# Patient Record
Sex: FEMALE
[De-identification: names, ages, dates, MRNs, and addresses within clinical notes are randomized per-mention and may not be internally consistent; named-entity substitution may affect disease eponyms.]

---

## 2017-03-21 ENCOUNTER — APPOINTMENT (OUTPATIENT)
Dept: SURGERY | Facility: CLINIC | Age: 52
End: 2017-03-21

## 2017-03-21 VITALS
WEIGHT: 135 LBS | BODY MASS INDEX: 21.69 KG/M2 | HEART RATE: 69 BPM | DIASTOLIC BLOOD PRESSURE: 91 MMHG | SYSTOLIC BLOOD PRESSURE: 142 MMHG | HEIGHT: 66 IN

## 2017-03-21 DIAGNOSIS — M32.9 SYSTEMIC LUPUS ERYTHEMATOSUS, UNSPECIFIED: ICD-10-CM

## 2017-03-21 DIAGNOSIS — D69.3 IMMUNE THROMBOCYTOPENIC PURPURA: ICD-10-CM

## 2017-03-21 DIAGNOSIS — Z80.9 FAMILY HISTORY OF MALIGNANT NEOPLASM, UNSPECIFIED: ICD-10-CM

## 2017-03-21 DIAGNOSIS — R22.0 LOCALIZED SWELLING, MASS AND LUMP, HEAD: ICD-10-CM

## 2017-03-22 PROBLEM — Z80.9 FAMILY HISTORY OF CANCER: Status: ACTIVE | Noted: 2017-03-22

## 2017-03-22 PROBLEM — D69.3 CHRONIC ITP (IDIOPATHIC THROMBOCYTOPENIC PURPURA): Status: ACTIVE | Noted: 2017-03-22

## 2017-03-22 PROBLEM — M32.9 LUPUS (SYSTEMIC LUPUS ERYTHEMATOSUS): Status: ACTIVE | Noted: 2017-03-22

## 2017-03-22 RX ORDER — ALPRAZOLAM 2 MG/1
TABLET ORAL
Refills: 0 | Status: ACTIVE | COMMUNITY

## 2017-03-22 RX ORDER — HYDROXYCHLOROQUINE SULFATE 200 MG/1
TABLET ORAL
Refills: 0 | Status: ACTIVE | COMMUNITY

## 2017-06-12 ENCOUNTER — INPATIENT (INPATIENT)
Facility: HOSPITAL | Age: 52
LOS: 6 days | Discharge: HOME | DRG: 392 | End: 2017-06-19
Attending: INTERNAL MEDICINE | Admitting: INTERNAL MEDICINE
Payer: COMMERCIAL

## 2017-06-12 VITALS
TEMPERATURE: 98 F | DIASTOLIC BLOOD PRESSURE: 75 MMHG | RESPIRATION RATE: 18 BRPM | SYSTOLIC BLOOD PRESSURE: 105 MMHG | HEART RATE: 97 BPM | OXYGEN SATURATION: 98 %

## 2017-06-12 DIAGNOSIS — Z90.710 ACQUIRED ABSENCE OF BOTH CERVIX AND UTERUS: Chronic | ICD-10-CM

## 2017-06-12 DIAGNOSIS — Z90.81 ACQUIRED ABSENCE OF SPLEEN: Chronic | ICD-10-CM

## 2017-06-12 DIAGNOSIS — R10.9 UNSPECIFIED ABDOMINAL PAIN: ICD-10-CM

## 2017-06-12 LAB
ALBUMIN SERPL ELPH-MCNC: 4.6 G/DL — SIGNIFICANT CHANGE UP (ref 3.3–5)
ALP SERPL-CCNC: 63 U/L — SIGNIFICANT CHANGE UP (ref 40–120)
ALT FLD-CCNC: 17 U/L RC — SIGNIFICANT CHANGE UP (ref 10–45)
ANION GAP SERPL CALC-SCNC: 14 MMOL/L — SIGNIFICANT CHANGE UP (ref 5–17)
APPEARANCE UR: ABNORMAL
AST SERPL-CCNC: 18 U/L — SIGNIFICANT CHANGE UP (ref 10–40)
BACTERIA # UR AUTO: ABNORMAL /HPF
BASOPHILS # BLD AUTO: 0.1 K/UL — SIGNIFICANT CHANGE UP (ref 0–0.2)
BASOPHILS NFR BLD AUTO: 1.2 % — SIGNIFICANT CHANGE UP (ref 0–2)
BILIRUB SERPL-MCNC: 0.4 MG/DL — SIGNIFICANT CHANGE UP (ref 0.2–1.2)
BILIRUB UR-MCNC: NEGATIVE — SIGNIFICANT CHANGE UP
BUN SERPL-MCNC: 14 MG/DL — SIGNIFICANT CHANGE UP (ref 7–23)
CALCIUM SERPL-MCNC: 9.7 MG/DL — SIGNIFICANT CHANGE UP (ref 8.4–10.5)
CHLORIDE SERPL-SCNC: 107 MMOL/L — SIGNIFICANT CHANGE UP (ref 96–108)
CO2 SERPL-SCNC: 25 MMOL/L — SIGNIFICANT CHANGE UP (ref 22–31)
COD CRY URNS QL: ABNORMAL
COLOR SPEC: YELLOW — SIGNIFICANT CHANGE UP
COMMENT - URINE: SIGNIFICANT CHANGE UP
CREAT SERPL-MCNC: 0.76 MG/DL — SIGNIFICANT CHANGE UP (ref 0.5–1.3)
DIFF PNL FLD: NEGATIVE — SIGNIFICANT CHANGE UP
EOSINOPHIL # BLD AUTO: 0.2 K/UL — SIGNIFICANT CHANGE UP (ref 0–0.5)
EOSINOPHIL NFR BLD AUTO: 1.8 % — SIGNIFICANT CHANGE UP (ref 0–6)
EPI CELLS # UR: SIGNIFICANT CHANGE UP /HPF
GAS PNL BLDV: SIGNIFICANT CHANGE UP
GLUCOSE SERPL-MCNC: 97 MG/DL — SIGNIFICANT CHANGE UP (ref 70–99)
GLUCOSE UR QL: NEGATIVE — SIGNIFICANT CHANGE UP
HCT VFR BLD CALC: 43.9 % — SIGNIFICANT CHANGE UP (ref 34.5–45)
HGB BLD-MCNC: 14.7 G/DL — SIGNIFICANT CHANGE UP (ref 11.5–15.5)
KETONES UR-MCNC: NEGATIVE — SIGNIFICANT CHANGE UP
LEUKOCYTE ESTERASE UR-ACNC: NEGATIVE — SIGNIFICANT CHANGE UP
LIDOCAIN IGE QN: 52 U/L — SIGNIFICANT CHANGE UP (ref 7–60)
LYMPHOCYTES # BLD AUTO: 41.6 % — SIGNIFICANT CHANGE UP (ref 13–44)
LYMPHOCYTES # BLD AUTO: 5 K/UL — HIGH (ref 1–3.3)
MCHC RBC-ENTMCNC: 33.5 GM/DL — SIGNIFICANT CHANGE UP (ref 32–36)
MCHC RBC-ENTMCNC: 33.7 PG — SIGNIFICANT CHANGE UP (ref 27–34)
MCV RBC AUTO: 101 FL — HIGH (ref 80–100)
MONOCYTES # BLD AUTO: 1 K/UL — HIGH (ref 0–0.9)
MONOCYTES NFR BLD AUTO: 8.2 % — SIGNIFICANT CHANGE UP (ref 2–14)
NEUTROPHILS # BLD AUTO: 5.7 K/UL — SIGNIFICANT CHANGE UP (ref 1.8–7.4)
NEUTROPHILS NFR BLD AUTO: 47.3 % — SIGNIFICANT CHANGE UP (ref 43–77)
NITRITE UR-MCNC: NEGATIVE — SIGNIFICANT CHANGE UP
PH UR: 5.5 — SIGNIFICANT CHANGE UP (ref 5–8)
PLATELET # BLD AUTO: 336 K/UL — SIGNIFICANT CHANGE UP (ref 150–400)
POTASSIUM SERPL-MCNC: 4.6 MMOL/L — SIGNIFICANT CHANGE UP (ref 3.5–5.3)
POTASSIUM SERPL-SCNC: 4.6 MMOL/L — SIGNIFICANT CHANGE UP (ref 3.5–5.3)
PROT SERPL-MCNC: 7.4 G/DL — SIGNIFICANT CHANGE UP (ref 6–8.3)
PROT UR-MCNC: SIGNIFICANT CHANGE UP
RBC # BLD: 4.36 M/UL — SIGNIFICANT CHANGE UP (ref 3.8–5.2)
RBC # FLD: 12.2 % — SIGNIFICANT CHANGE UP (ref 10.3–14.5)
RBC CASTS # UR COMP ASSIST: SIGNIFICANT CHANGE UP /HPF (ref 0–2)
SODIUM SERPL-SCNC: 146 MMOL/L — HIGH (ref 135–145)
SP GR SPEC: 1.03 — HIGH (ref 1.01–1.02)
UROBILINOGEN FLD QL: 1
WBC # BLD: 12 K/UL — HIGH (ref 3.8–10.5)
WBC # FLD AUTO: 12 K/UL — HIGH (ref 3.8–10.5)
WBC UR QL: SIGNIFICANT CHANGE UP /HPF (ref 0–5)

## 2017-06-12 PROCEDURE — 99285 EMERGENCY DEPT VISIT HI MDM: CPT

## 2017-06-12 PROCEDURE — 74177 CT ABD & PELVIS W/CONTRAST: CPT | Mod: 26

## 2017-06-12 RX ORDER — ONDANSETRON 8 MG/1
4 TABLET, FILM COATED ORAL ONCE
Qty: 0 | Refills: 0 | Status: COMPLETED | OUTPATIENT
Start: 2017-06-12 | End: 2017-06-12

## 2017-06-12 RX ORDER — MORPHINE SULFATE 50 MG/1
4 CAPSULE, EXTENDED RELEASE ORAL ONCE
Qty: 0 | Refills: 0 | Status: DISCONTINUED | OUTPATIENT
Start: 2017-06-12 | End: 2017-06-12

## 2017-06-12 RX ORDER — HYDROMORPHONE HYDROCHLORIDE 2 MG/ML
1 INJECTION INTRAMUSCULAR; INTRAVENOUS; SUBCUTANEOUS ONCE
Qty: 0 | Refills: 0 | Status: DISCONTINUED | OUTPATIENT
Start: 2017-06-12 | End: 2017-06-12

## 2017-06-12 RX ORDER — SODIUM CHLORIDE 9 MG/ML
2000 INJECTION, SOLUTION INTRAVENOUS ONCE
Qty: 0 | Refills: 0 | Status: COMPLETED | OUTPATIENT
Start: 2017-06-12 | End: 2017-06-12

## 2017-06-12 RX ADMIN — MORPHINE SULFATE 4 MILLIGRAM(S): 50 CAPSULE, EXTENDED RELEASE ORAL at 19:27

## 2017-06-12 RX ADMIN — HYDROMORPHONE HYDROCHLORIDE 1 MILLIGRAM(S): 2 INJECTION INTRAMUSCULAR; INTRAVENOUS; SUBCUTANEOUS at 19:50

## 2017-06-12 RX ADMIN — SODIUM CHLORIDE 2000 MILLILITER(S): 9 INJECTION, SOLUTION INTRAVENOUS at 18:46

## 2017-06-12 RX ADMIN — ONDANSETRON 4 MILLIGRAM(S): 8 TABLET, FILM COATED ORAL at 22:09

## 2017-06-12 RX ADMIN — HYDROMORPHONE HYDROCHLORIDE 1 MILLIGRAM(S): 2 INJECTION INTRAMUSCULAR; INTRAVENOUS; SUBCUTANEOUS at 19:31

## 2017-06-12 RX ADMIN — ONDANSETRON 4 MILLIGRAM(S): 8 TABLET, FILM COATED ORAL at 18:46

## 2017-06-12 RX ADMIN — MORPHINE SULFATE 4 MILLIGRAM(S): 50 CAPSULE, EXTENDED RELEASE ORAL at 22:30

## 2017-06-12 RX ADMIN — MORPHINE SULFATE 4 MILLIGRAM(S): 50 CAPSULE, EXTENDED RELEASE ORAL at 18:46

## 2017-06-12 RX ADMIN — MORPHINE SULFATE 4 MILLIGRAM(S): 50 CAPSULE, EXTENDED RELEASE ORAL at 22:09

## 2017-06-12 NOTE — ED PROVIDER NOTE - NS ED ROS FT
No fever/chills, no change in vision, no throat pain, no chest pain, +abdominal pain, +nausea, no vomiting,  no dysuria, no joint pain, no rashes, no focal numbness or weakness, no known mental health issues

## 2017-06-12 NOTE — ED ADULT NURSE REASSESSMENT NOTE - NS ED NURSE REASSESS COMMENT FT1
patient is resting in the room. c/o abdominal pain and distention. abdomen is slightly distended but soft. patient was medicated for pain as per md orders. denies any fevers, chills, chest pain, sob. vss/nad. patient is waiting for ct scan and dispo. will continue to monitor.

## 2017-06-12 NOTE — ED ADULT NURSE NOTE - OBJECTIVE STATEMENT
pt presents with abd distention, abd pain and vomiting x 1 day, hyperemesis began today, last BM this am, no change in stool, no UTI symptoms, H/O Pancreatitis, denies fever, chills, or chest pain, pt A;Ox3, no acute resp distress noted, v/s stable, abd soft, tender throughout abd., voids freely, good strength to all extrem, MD schumacher, labs sent, will continue to monitor

## 2017-06-12 NOTE — ED PROVIDER NOTE - PHYSICAL EXAMINATION
AAOX3, NAD, NCAT, EOMI, PERRL, MMM, Neck Supple, RRR, CTABL, ABD firm, distended, epigastric abd pain without rebound or guarding,, +BS, No CVAT, EXT warm, well perfused, No Edema, Distal Pulses Intact, No Rash,  MAEx4, Sensation to soft touch grossly intact, normal strength/tone.

## 2017-06-12 NOTE — ED PROVIDER NOTE - MEDICAL DECISION MAKING DETAILS
.pancreatitis - labs, IVF, analgesia, antiemetic as needed, reassess. .51F with hx of lupus ddx four years ago not on any controller meds, recurring pancreatitis, admitted last year to [facility], pw increaseed abd pain typical of her pancreatits, abd slightly distended with mild epigastric tenderness, will obtain labs, IVF, analgesia, antiemetic as needed, and possible CTAP, reassess. Will call Dr. Chávez [phone]. - ZR

## 2017-06-12 NOTE — ED PROVIDER NOTE - PROGRESS NOTE DETAILS
Discussed with Dr. Chávez - admit to [facility] if pt requires admission for pain control. Discussed with Radiology resident. Preliminary read is unremarkable CTAP

## 2017-06-12 NOTE — ED PROVIDER NOTE - OBJECTIVE STATEMENT
51F pmhx of SLE (diagnosed 12 years ago), pancreatitis, ITP (s/p splenectomy) pw epigastric abd pain since this afternoon typical aw bloating and abd distension. Typical of her pancreatitis pain. 51F pmhx of SLE (diagnosed 12 years ago), pancreatitis, ITP (s/p splenectomy) pw epigastric abd pain since this afternoon typical aw bloating and abd distension. Typical of her pancreatitis pain. Pain is 10/10 non radiating. Did not take meds for pain. Pain began after eating cheeseburger.   Denies fever, emesis, cough, diarrhea, hematochezia, melena, dysuria, rhinnorhea, rash,

## 2017-06-12 NOTE — ED ADULT NURSE REASSESSMENT NOTE - NS ED NURSE REASSESS COMMENT FT1
patient is resting in the [facility] waiting for ct results and dispo. c/o abdominal pain and was medicated as per md orders. vss/nad will continue to monitor.

## 2017-06-13 DIAGNOSIS — Z29.9 ENCOUNTER FOR PROPHYLACTIC MEASURES, UNSPECIFIED: ICD-10-CM

## 2017-06-13 DIAGNOSIS — R10.9 UNSPECIFIED ABDOMINAL PAIN: ICD-10-CM

## 2017-06-13 DIAGNOSIS — K86.1 OTHER CHRONIC PANCREATITIS: ICD-10-CM

## 2017-06-13 LAB
ALBUMIN SERPL ELPH-MCNC: 3.4 G/DL — SIGNIFICANT CHANGE UP (ref 3.3–5)
ALP SERPL-CCNC: 48 U/L — SIGNIFICANT CHANGE UP (ref 40–120)
ALT FLD-CCNC: 12 U/L — SIGNIFICANT CHANGE UP (ref 10–45)
ANION GAP SERPL CALC-SCNC: 11 MMOL/L — SIGNIFICANT CHANGE UP (ref 5–17)
AST SERPL-CCNC: 21 U/L — SIGNIFICANT CHANGE UP (ref 10–40)
BASOPHILS # BLD AUTO: 0.05 K/UL — SIGNIFICANT CHANGE UP (ref 0–0.2)
BASOPHILS NFR BLD AUTO: 0.5 % — SIGNIFICANT CHANGE UP (ref 0–2)
BILIRUB SERPL-MCNC: 0.6 MG/DL — SIGNIFICANT CHANGE UP (ref 0.2–1.2)
BUN SERPL-MCNC: 9 MG/DL — SIGNIFICANT CHANGE UP (ref 7–23)
CALCIUM SERPL-MCNC: 8.2 MG/DL — LOW (ref 8.4–10.5)
CHLORIDE SERPL-SCNC: 107 MMOL/L — SIGNIFICANT CHANGE UP (ref 96–108)
CO2 SERPL-SCNC: 23 MMOL/L — SIGNIFICANT CHANGE UP (ref 22–31)
CREAT SERPL-MCNC: 0.73 MG/DL — SIGNIFICANT CHANGE UP (ref 0.5–1.3)
CULTURE RESULTS: NO GROWTH — SIGNIFICANT CHANGE UP
EOSINOPHIL # BLD AUTO: 0.15 K/UL — SIGNIFICANT CHANGE UP (ref 0–0.5)
EOSINOPHIL NFR BLD AUTO: 1.6 % — SIGNIFICANT CHANGE UP (ref 0–6)
ERYTHROCYTE [SEDIMENTATION RATE] IN BLOOD: 4 MM/HR — SIGNIFICANT CHANGE UP (ref 0–20)
GLUCOSE SERPL-MCNC: 92 MG/DL — SIGNIFICANT CHANGE UP (ref 70–99)
HCT VFR BLD CALC: 36.6 % — SIGNIFICANT CHANGE UP (ref 34.5–45)
HGB BLD-MCNC: 12.1 G/DL — SIGNIFICANT CHANGE UP (ref 11.5–15.5)
IMM GRANULOCYTES NFR BLD AUTO: 0.1 % — SIGNIFICANT CHANGE UP (ref 0–1.5)
LYMPHOCYTES # BLD AUTO: 4.05 K/UL — HIGH (ref 1–3.3)
LYMPHOCYTES # BLD AUTO: 42.9 % — SIGNIFICANT CHANGE UP (ref 13–44)
MAGNESIUM SERPL-MCNC: 1.9 MG/DL — SIGNIFICANT CHANGE UP (ref 1.6–2.6)
MCHC RBC-ENTMCNC: 32.3 PG — SIGNIFICANT CHANGE UP (ref 27–34)
MCHC RBC-ENTMCNC: 33.1 GM/DL — SIGNIFICANT CHANGE UP (ref 32–36)
MCV RBC AUTO: 97.6 FL — SIGNIFICANT CHANGE UP (ref 80–100)
MONOCYTES # BLD AUTO: 0.82 K/UL — SIGNIFICANT CHANGE UP (ref 0–0.9)
MONOCYTES NFR BLD AUTO: 8.7 % — SIGNIFICANT CHANGE UP (ref 2–14)
NEUTROPHILS # BLD AUTO: 4.36 K/UL — SIGNIFICANT CHANGE UP (ref 1.8–7.4)
NEUTROPHILS NFR BLD AUTO: 46.2 % — SIGNIFICANT CHANGE UP (ref 43–77)
PHOSPHATE SERPL-MCNC: 3.7 MG/DL — SIGNIFICANT CHANGE UP (ref 2.5–4.5)
PLATELET # BLD AUTO: 317 K/UL — SIGNIFICANT CHANGE UP (ref 150–400)
POTASSIUM SERPL-MCNC: 4.3 MMOL/L — SIGNIFICANT CHANGE UP (ref 3.5–5.3)
POTASSIUM SERPL-SCNC: 4.3 MMOL/L — SIGNIFICANT CHANGE UP (ref 3.5–5.3)
PROT SERPL-MCNC: 6.1 G/DL — SIGNIFICANT CHANGE UP (ref 6–8.3)
RBC # BLD: 3.75 M/UL — LOW (ref 3.8–5.2)
RBC # FLD: 15.1 % — HIGH (ref 10.3–14.5)
SODIUM SERPL-SCNC: 141 MMOL/L — SIGNIFICANT CHANGE UP (ref 135–145)
SPECIMEN SOURCE: SIGNIFICANT CHANGE UP
WBC # BLD: 9.44 K/UL — SIGNIFICANT CHANGE UP (ref 3.8–10.5)
WBC # FLD AUTO: 9.44 K/UL — SIGNIFICANT CHANGE UP (ref 3.8–10.5)

## 2017-06-13 PROCEDURE — 99223 1ST HOSP IP/OBS HIGH 75: CPT

## 2017-06-13 PROCEDURE — 93010 ELECTROCARDIOGRAM REPORT: CPT

## 2017-06-13 RX ORDER — FAMOTIDINE 10 MG/ML
20 INJECTION INTRAVENOUS
Qty: 0 | Refills: 0 | Status: DISCONTINUED | OUTPATIENT
Start: 2017-06-13 | End: 2017-06-13

## 2017-06-13 RX ORDER — HYDROMORPHONE HYDROCHLORIDE 2 MG/ML
0.5 INJECTION INTRAMUSCULAR; INTRAVENOUS; SUBCUTANEOUS ONCE
Qty: 0 | Refills: 0 | Status: DISCONTINUED | OUTPATIENT
Start: 2017-06-13 | End: 2017-06-13

## 2017-06-13 RX ORDER — SODIUM CHLORIDE 9 MG/ML
1000 INJECTION INTRAMUSCULAR; INTRAVENOUS; SUBCUTANEOUS
Qty: 0 | Refills: 0 | Status: DISCONTINUED | OUTPATIENT
Start: 2017-06-13 | End: 2017-06-14

## 2017-06-13 RX ORDER — ACETAMINOPHEN 500 MG
500 TABLET ORAL EVERY 6 HOURS
Qty: 0 | Refills: 0 | Status: DISCONTINUED | OUTPATIENT
Start: 2017-06-13 | End: 2017-06-19

## 2017-06-13 RX ORDER — LIPASE/PROTEASE/AMYLASE 16-48-48K
2 CAPSULE,DELAYED RELEASE (ENTERIC COATED) ORAL
Qty: 0 | Refills: 0 | Status: DISCONTINUED | OUTPATIENT
Start: 2017-06-13 | End: 2017-06-19

## 2017-06-13 RX ORDER — ONDANSETRON 8 MG/1
4 TABLET, FILM COATED ORAL EVERY 6 HOURS
Qty: 0 | Refills: 0 | Status: DISCONTINUED | OUTPATIENT
Start: 2017-06-13 | End: 2017-06-19

## 2017-06-13 RX ORDER — ENOXAPARIN SODIUM 100 MG/ML
40 INJECTION SUBCUTANEOUS EVERY 24 HOURS
Qty: 0 | Refills: 0 | Status: DISCONTINUED | OUTPATIENT
Start: 2017-06-13 | End: 2017-06-13

## 2017-06-13 RX ORDER — FAMOTIDINE 10 MG/ML
20 INJECTION INTRAVENOUS
Qty: 0 | Refills: 0 | Status: DISCONTINUED | OUTPATIENT
Start: 2017-06-13 | End: 2017-06-14

## 2017-06-13 RX ADMIN — Medication 500 MILLIGRAM(S): at 08:44

## 2017-06-13 RX ADMIN — FAMOTIDINE 20 MILLIGRAM(S): 10 INJECTION INTRAVENOUS at 17:42

## 2017-06-13 RX ADMIN — HYDROMORPHONE HYDROCHLORIDE 0.5 MILLIGRAM(S): 2 INJECTION INTRAMUSCULAR; INTRAVENOUS; SUBCUTANEOUS at 13:07

## 2017-06-13 RX ADMIN — FAMOTIDINE 20 MILLIGRAM(S): 10 INJECTION INTRAVENOUS at 06:09

## 2017-06-13 RX ADMIN — HYDROMORPHONE HYDROCHLORIDE 0.5 MILLIGRAM(S): 2 INJECTION INTRAMUSCULAR; INTRAVENOUS; SUBCUTANEOUS at 13:03

## 2017-06-13 RX ADMIN — FAMOTIDINE 20 MILLIGRAM(S): 10 INJECTION INTRAVENOUS at 13:03

## 2017-06-13 RX ADMIN — HYDROMORPHONE HYDROCHLORIDE 0.5 MILLIGRAM(S): 2 INJECTION INTRAMUSCULAR; INTRAVENOUS; SUBCUTANEOUS at 04:41

## 2017-06-13 RX ADMIN — Medication 30 MILLILITER(S): at 03:22

## 2017-06-13 RX ADMIN — HYDROMORPHONE HYDROCHLORIDE 0.5 MILLIGRAM(S): 2 INJECTION INTRAMUSCULAR; INTRAVENOUS; SUBCUTANEOUS at 04:26

## 2017-06-13 RX ADMIN — Medication 500 MILLIGRAM(S): at 08:45

## 2017-06-13 NOTE — H&P ADULT - ASSESSMENT
50 yo female with PMH of SLE, chronic pancreatitis, ITP s/p splenectomy, presents here with abdominal pain and nausea.

## 2017-06-13 NOTE — H&P ADULT - PROBLEM SELECTOR PLAN 1
Possibly gastritis  CT abd/pelv shows normal pancreas, normal liver, no bowel obstruction  will c/w zofran 4mg IV q6h  c/w IVF  give maalox and pepcid  pain medication with tylenol, will try to avoid narcotics at this time  will keep NPO  ? need EGD ; consider GI consult Possibly gastritis vs. gastric ulcer  CT abd/pelv shows normal pancreas, normal liver, no bowel obstruction  will c/w zofran 4mg IV q6h  c/w IVF  give maalox and pepcid  pain medication with tylenol, will try to avoid narcotics at this time  will keep NPO  ? need EGD ; consider GI consult

## 2017-06-13 NOTE — CONSULT NOTE ADULT - SUBJECTIVE AND OBJECTIVE BOX
Patient is a 51y old  Female who presents with a chief complaint of Abdominal pain and nausea (2017 02:43)      HPI:  50 yo female with PMH of SLE, "chronic pancreatitis", ITP s/p splenectomy with presents with acute on chronic abdominal pain and nausea. Patient states that she intermittent has about pain, approximately once every 10 days. The vast majority of the time, the patient is moderate and she is able to tolerate it. Only a few times a year does the pain get severe enough that she needs to come to ED.   Approximately 3 days ago, she began to experience recurrence of her abdominal pain. Pain was 4/10 intensity, localized to the epigastrum, non-radiating and occurred after eating dinner. The pain was slightly better the next morning. However, after eating a bagel for lunch, she has severe epigastric pain, which was sharp, constant, 8/10 intensity and was associated with nausea and dry heaving. The patient was unable to eat or drink anymore so she came to ED to be evaluated. Pt states that her pain was similar to when she had acute pancreatitis.   The patient otherwise denies melena, blood in stool, diarrhea or constipation. In ED, the patient was hemodynamically stable and afebrile. Labs were unremarkable. lipase was normal. CT scan of abdomen and pelvis was unremarkable.   Pt denies taking medications. No NSAIDS. No ETOH. Pt had colonoscopy 3 years ago. Never had EGD.         PAST MEDICAL & SURGICAL HISTORY:  Lupus  Anxiety  ITP (idiopathic thrombocytopenic purpura)  Lupus (systemic lupus erythematosus)  Pancreas divisum  Pancreatitis  Breast CA  History of hysterectomy: secondary to endometriosis  History of splenectomy      Allergies    No Known Allergies      MEDICATIONS  (STANDING):  amylase/lipase/protease  (CREON 12,000 Units) 2Capsule(s) Oral three times a day with meals  sodium chloride 0.9%. 1000milliLiter(s) IV Continuous <Continuous>  famotidine Injectable 20milliGRAM(s) IV Push two times a day    MEDICATIONS  (PRN):  aluminum hydroxide/magnesium hydroxide/simethicone Suspension 30milliLiter(s) Oral every 4 hours PRN Dyspepsia  ondansetron Injectable 4milliGRAM(s) IV Push every 6 hours PRN Nausea and/or Vomiting  acetaminophen   Tablet. 500milliGRAM(s) Oral every 6 hours PRN Moderate Pain (4 - 6)      Social History:  No smoking, ETOH.     Review of Systems:    General:  No wt loss, fevers, chills, night sweats,fatigue,   CV:  No pain, palpitatioins, hypo/hypertension  Resp:  No dyspnea, cough, tachypnea, wheezing  GI:  ABdominal pain, nausea and retching. No diarrhea, No constipatiion, No weight loss, No fever, No pruritis, No rectal bleeding, No tarry stools, No dysphagia,  :  No pain, bleeding, incontinence, nocturia  Muscle:  No pain, weakness  Neuro:  No weakness, tingling, memory problems  Psych:  No fatigue, insomnia, mood problems, depression  Endocrine:  No polyuria, polydypsia, cold/heat intolerance  Heme:  No petechiae, ecchymosis, easy bruisability  Skin:  No rash, tattoos, scars, edema      Vital Signs Last 24 Hrs  T(C): 36.7, Max: 36.8 ( @ 18:33)  T(F): 98.1, Max: 98.3 ( @ 18:33)  HR: 55 (55 - 70)  BP: 126/73 (93/54 - 143/90)  BP(mean): --  RR: 18 (17 - 20)  SpO2: 100% (95% - 100%)    PHYSICAL EXAM:    Constitutional: NAD, well-developed  Neck: No LAD, supple  Respiratory: CTA and P  Cardiovascular: S1 and S2, RRR, no M  Gastrointestinal: BS+, soft, moderate epigastric tenderness without rebound or guarding  Extremities: No peripheral edema, neg clubing, cyanosis  Vascular: 2+ peripheral pulses  Neurological: A/O x 3, no focal deficits  Psychiatric: Normal mood, normal affect  Skin: No rashes        LABS:                        12.1   9.44  )-----------( 317      ( 2017 08:47 )             36.6     -    141  |  107  |  9   ----------------------------<  92  4.3   |  23  |  0.73    Ca    8.2<L>      2017 08:47  Phos  3.7     -  Mg     1.9     -    TPro  6.1  /  Alb  3.4  /  TBili  0.6  /  DBili  x   /  AST  21  /  ALT  12  /  AlkPhos  48  -13      Urinalysis Basic - ( 2017 19:51 )    Color: Yellow / Appearance: SL Turbid / S.030 / pH: x  Gluc: x / Ketone: Negative  / Bili: Negative / Urobili: 1   Blood: x / Protein: Trace / Nitrite: Negative   Leuk Esterase: Negative / RBC: 3-5 /HPF / WBC 0-2 /HPF   Sq Epi: x / Non Sq Epi: OCC /HPF / Bacteria: Few /HPF      LIVER FUNCTIONS - ( 2017 08:47 )  Alb: 3.4 g/dL / Pro: 6.1 g/dL / ALK PHOS: 48 U/L / ALT: 12 U/L / AST: 21 U/L / GGT: x             RADIOLOGY & ADDITIONAL TESTS:  CT Abdomen/pelvis:    LOWER CHEST: Bilateral breast implants partially imaged.    LIVER: Within normal limits.  BILE DUCTS: Stable prominence of intrahepatic bile ducts in the posterior   segment of the right hepatic lobe.  GALLBLADDER: Within normal limits.  SPLEEN: Status post splenectomy. Stable left upper quadrant nodule,   likely splenule.   PANCREAS: Within normal limits.  ADRENALS: Within normal limits.  KIDNEYS/URETERS: No hydronephrosis. Subcentimeter hypodense left lower   pole renal lesion, too small to characterize.    BLADDER: Within normal limits.  REPRODUCTIVE ORGANS: Status post hysterectomy. No adnexal masses.    BOWEL: No bowel obstruction. Appendix is normal.  PERITONEUM: No ascites.  VESSELS:  Within normal limits.  RETROPERITONEUM: No lymphadenopathy.    ABDOMINAL WALL: Within normal limits.  BONES: Within normal limits.    IMPRESSION: No bowel obstruction or evidence of acute bowel inflammation.

## 2017-06-13 NOTE — H&P ADULT - NSHPPHYSICALEXAM_GEN_ALL_CORE
GENERAL: No acute distress, well-developed  HEAD:  Atraumatic, Normocephalic  ENT: EOMI, PERRLA, conjunctiva and sclera clear, Neck supple, No JVD, moist mucosa  CHEST/LUNG: Clear to auscultation bilaterally; No wheeze, equal breath sounds bilaterally   BACK: No spinal tenderness, ROM intact  HEART: Regular rate and rhythm; No murmurs, rubs, or gallops  ABDOMEN: Soft, +epigastric tenderness; no rebound; Nondistended; Bowel sounds decreased  EXTREMITIES:  No clubbing, cyanosis, or edema  MSK: no joint effusion, tenderness/warmth, FROM  PSYCH: Normal behavior/affect  NEUROLOGY: AAOx3, non-focal, cranial nerves intact, moving all extremities  SKIN: Normal color, No rashes or lesions GENERAL: No acute distress, well-developed  HEAD:  Atraumatic, Normocephalic  ENT: EOMI, PERRLA, conjunctiva and sclera clear, Neck supple, No JVD, dry mucosa  CHEST/LUNG: Clear to auscultation bilaterally; No wheeze, equal breath sounds bilaterally   BACK: No spinal tenderness, ROM intact  HEART: Regular rate and rhythm; No murmurs, rubs, or gallops  ABDOMEN: Soft, +epigastric tenderness; no rebound; Nondistended; Bowel sounds decreased  EXTREMITIES:  No clubbing, cyanosis, or edema  MSK: no joint effusion, tenderness/warmth, FROM  PSYCH: Normal behavior/affect  NEUROLOGY: AAOx3, non-focal, cranial nerves intact, moving all extremities  SKIN: Normal color, No rashes or lesions

## 2017-06-13 NOTE — H&P ADULT - NSHPLABSRESULTS_GEN_ALL_CORE
Labs personally reviewed:  mild leukocytosis, Hb normal, mild hypernatremia, calcium normal; LFTs normal, lipase 52                        14.7   12.0  )-----------( 336      ( 2017 18:34 )             43.9     06-12    146<H>  |  107  |  14  ----------------------------<  97  4.6   |  25  |  0.76    Ca    9.7      2017 18:34    TPro  7.4  /  Alb  4.6  /  TBili  0.4  /  DBili  x   /  AST  18  /  ALT  17  /  AlkPhos  63  06-12        LIVER FUNCTIONS - ( 2017 18:34 )  Alb: 4.6 g/dL / Pro: 7.4 g/dL / ALK PHOS: 63 U/L / ALT: 17 U/L RC / AST: 18 U/L / GGT: x             Urinalysis Basic - ( 2017 19:51 )    Color: Yellow / Appearance: SL Turbid / S.030 / pH: x  Gluc: x / Ketone: Negative  / Bili: Negative / Urobili: 1   Blood: x / Protein: Trace / Nitrite: Negative   Leuk Esterase: Negative / RBC: 3-5 /HPF / WBC 0-2 /HPF   Sq Epi: x / Non Sq Epi: OCC /HPF / Bacteria: Few /HPF      CAPILLARY BLOOD GLUCOSE      Imaging:  CT abd/pelv personally reviewed: no bowel obstruction, normal liver and pancreas, stable prominence of biliary track.    EKG pending

## 2017-06-13 NOTE — H&P ADULT - NSHPSOCIALHISTORY_GEN_ALL_CORE
smokes 1/2 pack cigarettes x 30 years  occasionally drinks beer, last use was 2 days ago  no hx of drugs  house wife  lives with

## 2017-06-13 NOTE — H&P ADULT - HISTORY OF PRESENT ILLNESS
50 yo female with PMH of SLE, chronic pancreatitis, ITP s/p splenectomy, presents here with abdominal pain and nausea. Patient states that she ate lunch yesterday and started having abdominal pain and nausea after that.  She also felt she had abdominal bloating.  She says pain was located in the epigastric region, with no radiation.  She thought it was her usual pancreatitis.  She has not been able to eat or drink anything because kept having dry heaves.  She denies any vomiting.  Last bowel movement was yesterday morning.  She has not been passing flatus.  She also denies any fever, dysuria.  She noticed some blood in the urine yesterday.

## 2017-06-13 NOTE — CONSULT NOTE ADULT - ASSESSMENT
50 yo female with PMH of SLE, "chronic pancreatitis", ITP s/p splenectomy with presents with acute on chronic abdominal pain and nausea. No evidence of acute pancreatitis. DDx include chronic pancreatitis, peptic ulcer disease, H. pylori gastritis, celiac disease or functional dyspepsia.   - Plan for upper endoscopy tomorrow. Risks, benefits and alternatives discussed. All questions answered.   - Keep NPO for now. IVF hydration.   - After starting diet, agree with beginning Creon for chronic pancreatitis  - Zofran and analgesics prn.

## 2017-06-13 NOTE — H&P ADULT - FAMILY HISTORY
<<-----Click on this checkbox to enter Family History Family history of liver disease, liver cirrhosis     Family history of colon cancer     Grandparent  Still living? Unknown  Family history of breast cancer, Age at diagnosis: Age Unknown

## 2017-06-13 NOTE — H&P ADULT - PROBLEM SELECTOR PLAN 2
no signs of acute pancreatitis  will c/w IVF  c/w creon (patient apparently takes it only occasionally)

## 2017-06-13 NOTE — PROVIDER CONTACT NOTE (OTHER) - ASSESSMENT
Pt admitted to unit from ED c/o 8/10 mid upper abd pain, Maalox administered as initially requested w/ no relief. Pt grimacing, still c/o 8/10 mid upper abd pain

## 2017-06-13 NOTE — ED ADULT NURSE REASSESSMENT NOTE - NS ED NURSE REASSESS COMMENT FT1
patient is resting in the [facility] waiting for transport to go upstairs. denies any other complaints. vss/nad.

## 2017-06-13 NOTE — H&P ADULT - NSHPREVIEWOFSYSTEMS_GEN_ALL_CORE
REVIEW OF SYSTEMS:    CONSTITUTIONAL: No weakness, fevers or chills  EYES/ENT: No visual changes;  No dysphagia  NECK: No pain or stiffness  RESPIRATORY: No cough, wheezing, hemoptysis; No shortness of breath  CARDIOVASCULAR: No chest pain or palpitations; No lower extremity edema  GASTROINTESTINAL: + epigastric pain. + nausea, no vomiting, or hematemesis; No diarrhea or constipation. +increased abdominal bloating; No melena or hematochezia.  BACK: No back pain  GENITOURINARY: No dysuria, frequency or hematuria  NEUROLOGICAL: No numbness or weakness  MUSCULOSKELETAL: no edema, no joint pain  SKIN: No itching, burning, rashes, or lesions   All other review of systems is negative unless indicated above.

## 2017-06-14 ENCOUNTER — RESULT REVIEW (OUTPATIENT)
Age: 52
End: 2017-06-14

## 2017-06-14 LAB
ANION GAP SERPL CALC-SCNC: 22 MMOL/L — HIGH (ref 5–17)
ANION GAP SERPL CALC-SCNC: 22 MMOL/L — HIGH (ref 5–17)
BASOPHILS # BLD AUTO: 0.1 K/UL — SIGNIFICANT CHANGE UP (ref 0–0.2)
BASOPHILS NFR BLD AUTO: 0.7 % — SIGNIFICANT CHANGE UP (ref 0–2)
BUN SERPL-MCNC: 7 MG/DL — SIGNIFICANT CHANGE UP (ref 7–23)
BUN SERPL-MCNC: 9 MG/DL — SIGNIFICANT CHANGE UP (ref 7–23)
CALCIUM SERPL-MCNC: 8.9 MG/DL — SIGNIFICANT CHANGE UP (ref 8.4–10.5)
CALCIUM SERPL-MCNC: 9.4 MG/DL — SIGNIFICANT CHANGE UP (ref 8.4–10.5)
CHLORIDE SERPL-SCNC: 100 MMOL/L — SIGNIFICANT CHANGE UP (ref 96–108)
CHLORIDE SERPL-SCNC: 102 MMOL/L — SIGNIFICANT CHANGE UP (ref 96–108)
CO2 SERPL-SCNC: 16 MMOL/L — LOW (ref 22–31)
CO2 SERPL-SCNC: 18 MMOL/L — LOW (ref 22–31)
CREAT SERPL-MCNC: 0.68 MG/DL — SIGNIFICANT CHANGE UP (ref 0.5–1.3)
CREAT SERPL-MCNC: 0.76 MG/DL — SIGNIFICANT CHANGE UP (ref 0.5–1.3)
EOSINOPHIL # BLD AUTO: 0 K/UL — SIGNIFICANT CHANGE UP (ref 0–0.5)
EOSINOPHIL NFR BLD AUTO: 0.2 % — SIGNIFICANT CHANGE UP (ref 0–6)
GLUCOSE SERPL-MCNC: 58 MG/DL — LOW (ref 70–99)
GLUCOSE SERPL-MCNC: 93 MG/DL — SIGNIFICANT CHANGE UP (ref 70–99)
HCT VFR BLD CALC: 41.7 % — SIGNIFICANT CHANGE UP (ref 34.5–45)
HGB BLD-MCNC: 14 G/DL — SIGNIFICANT CHANGE UP (ref 11.5–15.5)
LIDOCAIN IGE QN: 21 U/L — SIGNIFICANT CHANGE UP (ref 7–60)
LYMPHOCYTES # BLD AUTO: 24.1 % — SIGNIFICANT CHANGE UP (ref 13–44)
LYMPHOCYTES # BLD AUTO: 3 K/UL — SIGNIFICANT CHANGE UP (ref 1–3.3)
MCHC RBC-ENTMCNC: 33.6 GM/DL — SIGNIFICANT CHANGE UP (ref 32–36)
MCHC RBC-ENTMCNC: 33.9 PG — SIGNIFICANT CHANGE UP (ref 27–34)
MCV RBC AUTO: 101 FL — HIGH (ref 80–100)
MONOCYTES # BLD AUTO: 0.8 K/UL — SIGNIFICANT CHANGE UP (ref 0–0.9)
MONOCYTES NFR BLD AUTO: 6.1 % — SIGNIFICANT CHANGE UP (ref 2–14)
NEUTROPHILS # BLD AUTO: 8.7 K/UL — HIGH (ref 1.8–7.4)
NEUTROPHILS NFR BLD AUTO: 68.9 % — SIGNIFICANT CHANGE UP (ref 43–77)
PLATELET # BLD AUTO: 279 K/UL — SIGNIFICANT CHANGE UP (ref 150–400)
POTASSIUM SERPL-MCNC: 4.3 MMOL/L — SIGNIFICANT CHANGE UP (ref 3.5–5.3)
POTASSIUM SERPL-MCNC: 4.7 MMOL/L — SIGNIFICANT CHANGE UP (ref 3.5–5.3)
POTASSIUM SERPL-SCNC: 4.3 MMOL/L — SIGNIFICANT CHANGE UP (ref 3.5–5.3)
POTASSIUM SERPL-SCNC: 4.7 MMOL/L — SIGNIFICANT CHANGE UP (ref 3.5–5.3)
RBC # BLD: 4.13 M/UL — SIGNIFICANT CHANGE UP (ref 3.8–5.2)
RBC # FLD: 11.9 % — SIGNIFICANT CHANGE UP (ref 10.3–14.5)
SODIUM SERPL-SCNC: 140 MMOL/L — SIGNIFICANT CHANGE UP (ref 135–145)
SODIUM SERPL-SCNC: 140 MMOL/L — SIGNIFICANT CHANGE UP (ref 135–145)
WBC # BLD: 12.6 K/UL — HIGH (ref 3.8–10.5)
WBC # FLD AUTO: 12.6 K/UL — HIGH (ref 3.8–10.5)

## 2017-06-14 PROCEDURE — 88305 TISSUE EXAM BY PATHOLOGIST: CPT | Mod: 26

## 2017-06-14 PROCEDURE — 88312 SPECIAL STAINS GROUP 1: CPT | Mod: 26

## 2017-06-14 RX ORDER — PANTOPRAZOLE SODIUM 20 MG/1
40 TABLET, DELAYED RELEASE ORAL
Qty: 0 | Refills: 0 | Status: DISCONTINUED | OUTPATIENT
Start: 2017-06-14 | End: 2017-06-16

## 2017-06-14 RX ORDER — DEXTROSE 50 % IN WATER 50 %
50 SYRINGE (ML) INTRAVENOUS ONCE
Qty: 0 | Refills: 0 | Status: DISCONTINUED | OUTPATIENT
Start: 2017-06-14 | End: 2017-06-14

## 2017-06-14 RX ORDER — HYDROMORPHONE HYDROCHLORIDE 2 MG/ML
0.5 INJECTION INTRAMUSCULAR; INTRAVENOUS; SUBCUTANEOUS EVERY 6 HOURS
Qty: 0 | Refills: 0 | Status: DISCONTINUED | OUTPATIENT
Start: 2017-06-14 | End: 2017-06-19

## 2017-06-14 RX ORDER — SODIUM CHLORIDE 9 MG/ML
1000 INJECTION, SOLUTION INTRAVENOUS
Qty: 0 | Refills: 0 | Status: DISCONTINUED | OUTPATIENT
Start: 2017-06-14 | End: 2017-06-15

## 2017-06-14 RX ADMIN — FAMOTIDINE 20 MILLIGRAM(S): 10 INJECTION INTRAVENOUS at 05:57

## 2017-06-14 RX ADMIN — SODIUM CHLORIDE 100 MILLILITER(S): 9 INJECTION, SOLUTION INTRAVENOUS at 13:08

## 2017-06-14 RX ADMIN — Medication 2 CAPSULE(S): at 21:41

## 2017-06-14 NOTE — PROGRESS NOTE ADULT - ASSESSMENT
52 yo female with PMH of SLE, chronic pancreatitis, ITP s/p splenectomy, presents here with abdominal pain and nausea.

## 2017-06-14 NOTE — PROGRESS NOTE ADULT - PROBLEM SELECTOR PLAN 1
Possibly gastritis vs. gastric ulcer  CT abd/pelv shows normal pancreas, normal liver, no bowel obstruction  will c/w zofran 4mg IV q6h  c/w IVF  give maalox and pepcid  pain medication with tylenol, will try to avoid narcotics at this time  NPO  EGD today.

## 2017-06-14 NOTE — PROGRESS NOTE ADULT - SUBJECTIVE AND OBJECTIVE BOX
Pre-Endoscopy Evaluation      Referring Physician:  MD [facility]                                  Procedure: EGD    Indication for Procedure: Abdominal Pain    Pertinent History:    Sedation by Anesthesia [x]    PAST MEDICAL & SURGICAL HISTORY:  Lupus  Anxiety  ITP (idiopathic thrombocytopenic purpura)  Lupus (systemic lupus erythematosus)  Pancreas divisum  Pancreatitis  Breast CA  History of hysterectomy: secondary to endometriosis  History of splenectomy      PMH of Gastroparesis [ ]  Gastric Surgery [ ]  Gastric Outlet Obstruction [ ] no    Allergies:    No Known Allergies    Latex allergy: [ ] yes [x ] no    Medications:MEDICATIONS  (STANDING):  amylase/lipase/protease  (CREON 12,000 Units) 2Capsule(s) Oral three times a day with meals  famotidine Injectable 20milliGRAM(s) IV Push two times a day  dextrose 5% + sodium chloride 0.9%. 1000milliLiter(s) IV Continuous <Continuous>    MEDICATIONS  (PRN):  aluminum hydroxide/magnesium hydroxide/simethicone Suspension 30milliLiter(s) Oral every 4 hours PRN Dyspepsia  ondansetron Injectable 4milliGRAM(s) IV Push every 6 hours PRN Nausea and/or Vomiting  acetaminophen   Tablet. 500milliGRAM(s) Oral every 6 hours PRN Moderate Pain (4 - 6)  HYDROmorphone  Injectable 0.5milliGRAM(s) IV Push every 6 hours PRN Severe Pain (7 - 10)      Smoking: [ ] yes  [x ] no    AICD/PPM: [ ] yes   [ x] no    Pertinent lab data:                        14.0   12.6  )-----------( 279      ( 14 Jun 2017 10:15 )             41.7     06-14    140  |  102  |  9   ----------------------------<  58<L>  4.3   |  16<L>  |  0.68    Ca    8.9      14 Jun 2017 10:15  Phos  3.7     06-13  Mg     1.9     06-13    TPro  6.1  /  Alb  3.4  /  TBili  0.6  /  DBili  x   /  AST  21  /  ALT  12  /  AlkPhos  48  06-13      Physical Examination:    Daily   Vital Signs Last 24 Hrs  T(C): 36.7, Max: 36.7 (06-13 @ 22:08)  T(F): 98.1, Max: 98.1 (06-13 @ 22:08)  HR: 74 (71 - 83)  BP: 134/85 (90/52 - 134/85)  BP(mean): --  RR: 18 (18 - 18)  SpO2: 98% (96% - 99%)    BP:                 HR:                  SPO2:               Temperature:    Constitutional: NAD    HEENT: PERRLA, EOMI,       Neck:  No JVD    Respiratory: CTAB/L    Cardiovascular: S1 and S2    Gastrointestinal: BS+, soft, NT/ND    Extremities: No peripheral edema    Neurological: A/O x 3    Psychiatric: Normal mood, normal affect    Comments:    ASA Class: I [ ]  II [ x]  III [ ]  IV [ ]    The patient is a suitable candidate for the planned procedure unless box checked [ ]  No, explain: Pre-Endoscopy Evaluation      Referring Physician:  MD Phil                                  Procedure: EGD    Indication for Procedure: Abdominal Pain    Pertinent History: 52 yo female with hx of SLE, chronic pancreatitis admitted with abdominal pain, nausea, CT abd/pelvis without evidence of acute pathology    Sedation by Anesthesia [x]    PAST MEDICAL & SURGICAL HISTORY:  Lupus  Anxiety  ITP (idiopathic thrombocytopenic purpura)  Lupus (systemic lupus erythematosus)  Pancreas divisum  Pancreatitis  Breast CA  History of hysterectomy: secondary to endometriosis  History of splenectomy      PMH of Gastroparesis [ ]  Gastric Surgery [ ]  Gastric Outlet Obstruction [ ] no    Allergies:    No Known Allergies    Latex allergy: [ ] yes [x ] no    Medications:MEDICATIONS  (STANDING):  amylase/lipase/protease  (CREON 12,000 Units) 2Capsule(s) Oral three times a day with meals  famotidine Injectable 20milliGRAM(s) IV Push two times a day  dextrose 5% + sodium chloride 0.9%. 1000milliLiter(s) IV Continuous <Continuous>    MEDICATIONS  (PRN):  aluminum hydroxide/magnesium hydroxide/simethicone Suspension 30milliLiter(s) Oral every 4 hours PRN Dyspepsia  ondansetron Injectable 4milliGRAM(s) IV Push every 6 hours PRN Nausea and/or Vomiting  acetaminophen   Tablet. 500milliGRAM(s) Oral every 6 hours PRN Moderate Pain (4 - 6)  HYDROmorphone  Injectable 0.5milliGRAM(s) IV Push every 6 hours PRN Severe Pain (7 - 10)      Smoking: [ ] yes  [x ] no    AICD/PPM: [ ] yes   [ x] no    Pertinent lab data:                        14.0   12.6  )-----------( 279      ( 14 Jun 2017 10:15 )             41.7     06-14    140  |  102  |  9   ----------------------------<  58<L>  4.3   |  16<L>  |  0.68    Ca    8.9      14 Jun 2017 10:15  Phos  3.7     06-13  Mg     1.9     06-13    TPro  6.1  /  Alb  3.4  /  TBili  0.6  /  DBili  x   /  AST  21  /  ALT  12  /  AlkPhos  48  06-13      Physical Examination:    Daily   Vital Signs Last 24 Hrs  T(C): 36.7, Max: 36.7 (06-13 @ 22:08)  T(F): 98.1, Max: 98.1 (06-13 @ 22:08)  HR: 74 (71 - 83)  BP: 134/85 (90/52 - 134/85)  BP(mean): --  RR: 18 (18 - 18)  SpO2: 98% (96% - 99%)    BP:                 HR:                  SPO2:               Temperature:    Constitutional: NAD    HEENT: PERRLA, EOMI,       Neck:  No JVD    Respiratory: CTAB/L    Cardiovascular: S1 and S2    Gastrointestinal: BS+, soft, NT/ND    Extremities: No peripheral edema    Neurological: A/O x 3    Psychiatric: Normal mood, normal affect    Comments:    ASA Class: I [ ]  II [ x]  III [ ]  IV [ ]    The patient is a suitable candidate for the planned procedure unless box checked [ ]  No, explain:

## 2017-06-14 NOTE — PROGRESS NOTE ADULT - ASSESSMENT
52 yo female with PMH of SLE, chronic pancreatitis, ITP s/p splenectomy who presents with acute on chronic abdominal pain and nausea. No evidence of acute pancreatitis on labs or imaging. DDx include chronic pancreatitis, peptic ulcer disease, H. pylori gastritis, celiac disease or functional dyspepsia.   - Awaiting upper endoscopy today.   - Keep NPO for now. IVF hydration, until after procedure.   - Creon for chronic pancreatitis, after initiation of diet.   - If EGD is negative, recommend abdominal US to assess for gallstones.   - Zofran and analgesics prn.

## 2017-06-14 NOTE — PROGRESS NOTE ADULT - SUBJECTIVE AND OBJECTIVE BOX
INTERVAL HPI/OVERNIGHT EVENTS:    SUBJECTIVE: The patient continues to complain of epigastric abdominal pain and nausea. No vomiting. No diarrhea.     MEDICATIONS  (STANDING):  amylase/lipase/protease  (CREON 12,000 Units) 2Capsule(s) Oral three times a day with meals  famotidine Injectable 20milliGRAM(s) IV Push two times a day  dextrose 5% + sodium chloride 0.9%. 1000milliLiter(s) IV Continuous <Continuous>    MEDICATIONS  (PRN):  aluminum hydroxide/magnesium hydroxide/simethicone Suspension 30milliLiter(s) Oral every 4 hours PRN Dyspepsia  ondansetron Injectable 4milliGRAM(s) IV Push every 6 hours PRN Nausea and/or Vomiting  acetaminophen   Tablet. 500milliGRAM(s) Oral every 6 hours PRN Moderate Pain (4 - 6)      Vital Signs Last 24 Hrs  T(C): 36.3, Max: 36.7 ( @ 14:28)  T(F): 97.4, Max: 98.1 ( @ 14:28)  HR: 83 (55 - 83)  BP: 104/71 (90/52 - 126/73)  BP(mean): --  RR: 18 (18 - 18)  SpO2: 97% (96% - 100%)    PHYSICAL EXAM:    Constitutional: NAD, well-developed  Respiratory: CTA and P  Cardiovascular: S1 and S2, RRR, no M  Gastrointestinal: BS+, soft, moderate epigastric tenderness without rebound or guarding. Non-distended. Normal bowel sounds.   Extremities: No peripheral edema, neg clubbing, cyanosis      LABS:                        14.0   12.6  )-----------( 279      ( 2017 10:15 )             41.7     06-14    140  |  102  |  9   ----------------------------<  58<L>  4.3   |  16<L>  |  0.68    Ca    8.9      2017 10:15  Phos  3.7     06-  Mg     1.9     -    TPro  6.1  /  Alb  3.4  /  TBili  0.6  /  DBili  x   /  AST  21  /  ALT  12  /  AlkPhos  48  06-13      Urinalysis Basic - ( 2017 19:51 )    Color: Yellow / Appearance: SL Turbid / S.030 / pH: x  Gluc: x / Ketone: Negative  / Bili: Negative / Urobili: 1   Blood: x / Protein: Trace / Nitrite: Negative   Leuk Esterase: Negative / RBC: 3-5 /HPF / WBC 0-2 /HPF   Sq Epi: x / Non Sq Epi: OCC /HPF / Bacteria: Few /HPF      LIVER FUNCTIONS - ( 2017 08:47 )  Alb: 3.4 g/dL / Pro: 6.1 g/dL / ALK PHOS: 48 U/L / ALT: 12 U/L / AST: 21 U/L / GGT: x             RADIOLOGY & ADDITIONAL TESTS:  CT scan abdomen/pelvis:  FINDINGS:    LOWER CHEST: Bilateral breast implants partially imaged.    LIVER: Within normal limits.  BILE DUCTS: Stable prominence of intrahepatic bile ducts in the posterior   segment of the right hepatic lobe.  GALLBLADDER: Within normal limits.  SPLEEN: Status post splenectomy. Stable left upper quadrant nodule,   likely splenule.   PANCREAS: Within normal limits.  ADRENALS: Within normal limits.  KIDNEYS/URETERS: No hydronephrosis. Subcentimeter hypodense left lower   pole renal lesion, too small to characterize.    BLADDER: Within normal limits.  REPRODUCTIVE ORGANS: Status post hysterectomy. No adnexal masses.    BOWEL: No bowel obstruction. Appendix is normal.  PERITONEUM: No ascites.  VESSELS:  Within normal limits.  RETROPERITONEUM: No lymphadenopathy.   ABDOMINAL WALL: Within normal limits.  BONES: Within normal limits.    IMPRESSION: No bowel obstruction or evidence of acute bowel inflammation.

## 2017-06-14 NOTE — PROGRESS NOTE ADULT - SUBJECTIVE AND OBJECTIVE BOX
Patient is a 51y old  Female who presents with a chief complaint of Abdominal pain and nausea (13 Jun 2017 02:43)      SUBJECTIVE / OVERNIGHT EVENTS:   Feels better.  Denies CP/SOB/Palpitation/HA.    MEDICATIONS  (STANDING):  amylase/lipase/protease  (CREON 12,000 Units) 2Capsule(s) Oral three times a day with meals  dextrose 5% + sodium chloride 0.9%. 1000milliLiter(s) IV Continuous <Continuous>  pantoprazole    Tablet 40milliGRAM(s) Oral before breakfast    MEDICATIONS  (PRN):  aluminum hydroxide/magnesium hydroxide/simethicone Suspension 30milliLiter(s) Oral every 4 hours PRN Dyspepsia  ondansetron Injectable 4milliGRAM(s) IV Push every 6 hours PRN Nausea and/or Vomiting  acetaminophen   Tablet. 500milliGRAM(s) Oral every 6 hours PRN Moderate Pain (4 - 6)  HYDROmorphone  Injectable 0.5milliGRAM(s) IV Push every 6 hours PRN Severe Pain (7 - 10)      Vital Signs Last 24 Hrs  T(C): 36.7, Max: 36.7 (06-14 @ 01:34)  HR: 76 (71 - 83)  BP: 149/88 (90/52 - 149/88)  RR: 18 (18 - 18)  SpO2: 98% (96% - 99%)  Wt(kg): --  CAPILLARY BLOOD GLUCOSE  87 (14 Jun 2017 17:46)    I&O's Summary  I & Os for 24h ending 14 Jun 2017 07:00  =============================================  IN: 2300 ml / OUT: 0 ml / NET: 2300 ml    I & Os for current day (as of 15 Barry 2017 00:14)  =============================================  IN: 0 ml / OUT: 0 ml / NET: 0 ml      PHYSICAL EXAM:  GENERAL: NAD, well-developed  HEAD:  Atraumatic, Normocephalic  EYES: EOMI, PERRLA, conjunctiva and sclera clear  NECK: Supple, No JVD  CHEST/LUNG: Clear to auscultation bilaterally; No wheeze  HEART: Regular rate and rhythm; No murmurs, rubs, or gallops  ABDOMEN: Soft, Nontender, Nondistended; Bowel sounds present  EXTREMITIES:  2+ Peripheral Pulses, No clubbing, cyanosis, or edema  PSYCH: AAOx3  NEUROLOGY: non-focal  SKIN: No rashes or lesions    LABS:                        14.0   12.6  )-----------( 279      ( 14 Jun 2017 10:15 )             41.7     06-14    140  |  100  |  7   ----------------------------<  93  4.7   |  18<L>  |  0.76    Ca    9.4      14 Jun 2017 19:21  Phos  3.7     06-13  Mg     1.9     06-13    TPro  6.1  /  Alb  3.4  /  TBili  0.6  /  DBili  x   /  AST  21  /  ALT  12  /  AlkPhos  48  06-13            CAPILLARY BLOOD GLUCOSE  87 (14 Jun 2017 17:46)    06-12 @ 23:20  Culture-urine --  Culture results   No growth  method type --  Organism --  Organism Identification --  Specimen source .Urine Clean Catch (Midstream)           06-12 @ 23:20  Culture blood --  Culture results   No growth  Gram stain --  Gram stain blood --  Method type --  Organism --  Organism identification --  Specimen source .Urine Clean Catch (Midstream)      RADIOLOGY & ADDITIONAL TESTS:    Imaging Personally Reviewed:    Consultant(s) Notes Reviewed:      Care Discussed with Consultants/Other Providers:

## 2017-06-15 LAB
ALBUMIN SERPL ELPH-MCNC: 4.1 G/DL — SIGNIFICANT CHANGE UP (ref 3.3–5)
ALP SERPL-CCNC: 57 U/L — SIGNIFICANT CHANGE UP (ref 40–120)
ALT FLD-CCNC: 13 U/L — SIGNIFICANT CHANGE UP (ref 10–45)
ANION GAP SERPL CALC-SCNC: 17 MMOL/L — SIGNIFICANT CHANGE UP (ref 5–17)
AST SERPL-CCNC: 22 U/L — SIGNIFICANT CHANGE UP (ref 10–40)
BILIRUB SERPL-MCNC: 0.6 MG/DL — SIGNIFICANT CHANGE UP (ref 0.2–1.2)
BUN SERPL-MCNC: 6 MG/DL — LOW (ref 7–23)
CALCIUM SERPL-MCNC: 9.1 MG/DL — SIGNIFICANT CHANGE UP (ref 8.4–10.5)
CHLORIDE SERPL-SCNC: 105 MMOL/L — SIGNIFICANT CHANGE UP (ref 96–108)
CO2 SERPL-SCNC: 20 MMOL/L — LOW (ref 22–31)
CREAT SERPL-MCNC: 0.93 MG/DL — SIGNIFICANT CHANGE UP (ref 0.5–1.3)
GLUCOSE SERPL-MCNC: 102 MG/DL — HIGH (ref 70–99)
LIDOCAIN IGE QN: 32 U/L — SIGNIFICANT CHANGE UP (ref 7–60)
POTASSIUM SERPL-MCNC: 4 MMOL/L — SIGNIFICANT CHANGE UP (ref 3.5–5.3)
POTASSIUM SERPL-SCNC: 4 MMOL/L — SIGNIFICANT CHANGE UP (ref 3.5–5.3)
PROT SERPL-MCNC: 7.1 G/DL — SIGNIFICANT CHANGE UP (ref 6–8.3)
SODIUM SERPL-SCNC: 142 MMOL/L — SIGNIFICANT CHANGE UP (ref 135–145)
SURGICAL PATHOLOGY STUDY: SIGNIFICANT CHANGE UP

## 2017-06-15 RX ORDER — SODIUM CHLORIDE 9 MG/ML
1000 INJECTION INTRAMUSCULAR; INTRAVENOUS; SUBCUTANEOUS
Qty: 0 | Refills: 0 | Status: DISCONTINUED | OUTPATIENT
Start: 2017-06-15 | End: 2017-06-16

## 2017-06-15 RX ORDER — HYDROMORPHONE HYDROCHLORIDE 2 MG/ML
0.5 INJECTION INTRAMUSCULAR; INTRAVENOUS; SUBCUTANEOUS ONCE
Qty: 0 | Refills: 0 | Status: DISCONTINUED | OUTPATIENT
Start: 2017-06-15 | End: 2017-06-15

## 2017-06-15 RX ADMIN — HYDROMORPHONE HYDROCHLORIDE 0.5 MILLIGRAM(S): 2 INJECTION INTRAMUSCULAR; INTRAVENOUS; SUBCUTANEOUS at 06:59

## 2017-06-15 RX ADMIN — Medication 2 CAPSULE(S): at 18:15

## 2017-06-15 RX ADMIN — HYDROMORPHONE HYDROCHLORIDE 0.5 MILLIGRAM(S): 2 INJECTION INTRAMUSCULAR; INTRAVENOUS; SUBCUTANEOUS at 11:52

## 2017-06-15 RX ADMIN — HYDROMORPHONE HYDROCHLORIDE 0.5 MILLIGRAM(S): 2 INJECTION INTRAMUSCULAR; INTRAVENOUS; SUBCUTANEOUS at 12:05

## 2017-06-15 RX ADMIN — SODIUM CHLORIDE 50 MILLILITER(S): 9 INJECTION INTRAMUSCULAR; INTRAVENOUS; SUBCUTANEOUS at 15:51

## 2017-06-15 RX ADMIN — HYDROMORPHONE HYDROCHLORIDE 0.5 MILLIGRAM(S): 2 INJECTION INTRAMUSCULAR; INTRAVENOUS; SUBCUTANEOUS at 02:46

## 2017-06-15 RX ADMIN — HYDROMORPHONE HYDROCHLORIDE 0.5 MILLIGRAM(S): 2 INJECTION INTRAMUSCULAR; INTRAVENOUS; SUBCUTANEOUS at 18:53

## 2017-06-15 RX ADMIN — HYDROMORPHONE HYDROCHLORIDE 0.5 MILLIGRAM(S): 2 INJECTION INTRAMUSCULAR; INTRAVENOUS; SUBCUTANEOUS at 18:17

## 2017-06-15 RX ADMIN — PANTOPRAZOLE SODIUM 40 MILLIGRAM(S): 20 TABLET, DELAYED RELEASE ORAL at 08:34

## 2017-06-15 RX ADMIN — HYDROMORPHONE HYDROCHLORIDE 0.5 MILLIGRAM(S): 2 INJECTION INTRAMUSCULAR; INTRAVENOUS; SUBCUTANEOUS at 03:00

## 2017-06-15 RX ADMIN — HYDROMORPHONE HYDROCHLORIDE 0.5 MILLIGRAM(S): 2 INJECTION INTRAMUSCULAR; INTRAVENOUS; SUBCUTANEOUS at 07:00

## 2017-06-15 RX ADMIN — SODIUM CHLORIDE 50 MILLILITER(S): 9 INJECTION INTRAMUSCULAR; INTRAVENOUS; SUBCUTANEOUS at 15:43

## 2017-06-15 RX ADMIN — Medication 2 CAPSULE(S): at 11:45

## 2017-06-15 RX ADMIN — HYDROMORPHONE HYDROCHLORIDE 0.5 MILLIGRAM(S): 2 INJECTION INTRAMUSCULAR; INTRAVENOUS; SUBCUTANEOUS at 22:32

## 2017-06-15 NOTE — PROGRESS NOTE ADULT - ASSESSMENT
52 yo female with PMH of SLE, chronic pancreatitis, ITP s/p splenectomy who presents with acute on chronic abdominal pain, nausea/vomiting and diarrhea. No evidence of acute pancreatitis or acute abdominal pathology on labs or imaging. Pt underwent EGD on 6/14/17, which was unremarkable. Gastric and duodenal biopsies were unremarkable. Pt tried regular diet last night but had recurrent abdominal pain and diarrhea. Likely has viral gastroenteritis. Unlikely bacterial infection given lack of fever, leukocytosis or significant CT findings.     - Degrade back to clear liquid diet today  - IVF  - Creon for chronic pancreatitis  - Will order abdominal US to assess for gallstones.   - Zofran and analgesics prn.

## 2017-06-15 NOTE — PROGRESS NOTE ADULT - SUBJECTIVE AND OBJECTIVE BOX
INTERVAL HPI/OVERNIGHT EVENTS:    Subjective: The patient tried eating dinner last night and had small piece of meat and bread but developed abdominal pain and diarrhea. Tried eating Jello this morning but had bilious emesis. Pt currently afebrile.     MEDICATIONS  (STANDING):  amylase/lipase/protease  (CREON 12,000 Units) 2Capsule(s) Oral three times a day with meals  pantoprazole    Tablet 40milliGRAM(s) Oral before breakfast  sodium chloride 0.9%. 1000milliLiter(s) IV Continuous <Continuous>    MEDICATIONS  (PRN):  aluminum hydroxide/magnesium hydroxide/simethicone Suspension 30milliLiter(s) Oral every 4 hours PRN Dyspepsia  ondansetron Injectable 4milliGRAM(s) IV Push every 6 hours PRN Nausea and/or Vomiting  acetaminophen   Tablet. 500milliGRAM(s) Oral every 6 hours PRN Moderate Pain (4 - 6)  HYDROmorphone  Injectable 0.5milliGRAM(s) IV Push every 6 hours PRN Severe Pain (7 - 10)        Vital Signs Last 24 Hrs  T(C): 36.7, Max: 36.7 (06-14 @ 16:49)  T(F): 98.1, Max: 98.1 (06-14 @ 16:49)  HR: 63 (63 - 76)  BP: 133/85 (114/80 - 149/88)  BP(mean): --  RR: 18 (18 - 18)  SpO2: 100% (97% - 100%)    PHYSICAL EXAM:    Constitutional: NAD, well-developed  Respiratory: CTA and P  Cardiovascular: S1 and S2, RRR, no M  Gastrointestinal: BS+, soft, NT/ND, neg HSM,  Extremities: No peripheral edema, neg clubing, cyanosis  Vascular: 2+ peripheral pulses      LABS:                        14.0   12.6  )-----------( 279      ( 14 Jun 2017 10:15 )             41.7     06-15    142  |  105  |  6<L>  ----------------------------<  102<H>  4.0   |  20<L>  |  0.93    Ca    9.1      15 Barry 2017 08:15    TPro  7.1  /  Alb  4.1  /  TBili  0.6  /  DBili  x   /  AST  22  /  ALT  13  /  AlkPhos  57  06-15        LIVER FUNCTIONS - ( 15 Barry 2017 08:15 )  Alb: 4.1 g/dL / Pro: 7.1 g/dL / ALK PHOS: 57 U/L / ALT: 13 U/L / AST: 22 U/L / GGT: x             RADIOLOGY & ADDITIONAL TESTS:  CT Abdomen/Pelvis:   FINDINGS:    LOWER CHEST: Bilateral breast implants partially imaged.    LIVER: Within normal limits.  BILE DUCTS: Stable prominence of intrahepatic bile ducts in the posterior   segment of the right hepatic lobe.  GALLBLADDER: Within normal limits.  SPLEEN: Status post splenectomy. Stable left upper quadrant nodule,   likely splenule.   PANCREAS: Within normal limits.  ADRENALS: Within normal limits.  KIDNEYS/URETERS: No hydronephrosis. Subcentimeter hypodense left lower   pole renal lesion, too small to characterize.    BLADDER: Within normal limits.  REPRODUCTIVE ORGANS: Status post hysterectomy. No adnexal masses.    BOWEL: No bowel obstruction. Appendix is normal.  PERITONEUM: No ascites.  VESSELS:  Within normal limits.  RETROPERITONEUM: No lymphadenopathy.   ABDOMINAL WALL: Within normal limits.  BONES: Within normal limits.    IMPRESSION: No bowel obstruction or evidence of acute bowel inflammation.

## 2017-06-15 NOTE — PROGRESS NOTE ADULT - SUBJECTIVE AND OBJECTIVE BOX
Patient is a 51y old  Female who presents with a chief complaint of Abdominal pain and nausea (13 Jun 2017 02:43)      SUBJECTIVE / OVERNIGHT EVENTS:   Feels better.  Denies CP/SOB/Palpitation/HA.    MEDICATIONS  (STANDING):  amylase/lipase/protease  (CREON 12,000 Units) 2Capsule(s) Oral three times a day with meals  pantoprazole    Tablet 40milliGRAM(s) Oral before breakfast  sodium chloride 0.9%. 1000milliLiter(s) IV Continuous <Continuous>    MEDICATIONS  (PRN):  aluminum hydroxide/magnesium hydroxide/simethicone Suspension 30milliLiter(s) Oral every 4 hours PRN Dyspepsia  ondansetron Injectable 4milliGRAM(s) IV Push every 6 hours PRN Nausea and/or Vomiting  acetaminophen   Tablet. 500milliGRAM(s) Oral every 6 hours PRN Moderate Pain (4 - 6)  HYDROmorphone  Injectable 0.5milliGRAM(s) IV Push every 6 hours PRN Severe Pain (7 - 10)      Vital Signs Last 24 Hrs  T(C): 36.7, Max: 36.7 (06-15 @ 05:45)  HR: 60 (60 - 76)  BP: 123/74 (114/80 - 133/85)  RR: 17 (17 - 18)  SpO2: 100% (97% - 100%)  Wt(kg): --  CAPILLARY BLOOD GLUCOSE    I&O's Summary  I & Os for 24h ending 15 Barry 2017 07:00  =============================================  IN: 0 ml / OUT: 0 ml / NET: 0 ml    I & Os for current day (as of 16 Jun 2017 01:10)  =============================================  IN: 240 ml / OUT: 0 ml / NET: 240 ml      PHYSICAL EXAM:  GENERAL: NAD, well-developed  HEAD:  Atraumatic, Normocephalic  EYES: EOMI, PERRLA, conjunctiva and sclera clear  NECK: Supple, No JVD  CHEST/LUNG: Clear to auscultation bilaterally; No wheeze  HEART: Regular rate and rhythm; No murmurs, rubs, or gallops  ABDOMEN: Soft, Nontender, Nondistended; Bowel sounds present  EXTREMITIES:  2+ Peripheral Pulses, No clubbing, cyanosis, or edema  PSYCH: AAOx3  NEUROLOGY: non-focal  SKIN: No rashes or lesions    LABS:                        14.0   12.6  )-----------( 279      ( 14 Jun 2017 10:15 )             41.7     06-15    142  |  105  |  6<L>  ----------------------------<  102<H>  4.0   |  20<L>  |  0.93    Ca    9.1      15 Barry 2017 08:15    TPro  7.1  /  Alb  4.1  /  TBili  0.6  /  DBili  x   /  AST  22  /  ALT  13  /  AlkPhos  57  06-15            CAPILLARY BLOOD GLUCOSE    06-12 @ 23:20  Culture-urine --  Culture results   No growth  method type --  Organism --  Organism Identification --  Specimen source .Urine Clean Catch (Midstream)           06-12 @ 23:20  Culture blood --  Culture results   No growth  Gram stain --  Gram stain blood --  Method type --  Organism --  Organism identification --  Specimen source .Urine Clean Catch (Midstream)      RADIOLOGY & ADDITIONAL TESTS:    Imaging Personally Reviewed:    Consultant(s) Notes Reviewed:      Care Discussed with Consultants/Other Providers:

## 2017-06-16 LAB
ANION GAP SERPL CALC-SCNC: 14 MMOL/L — SIGNIFICANT CHANGE UP (ref 5–17)
BUN SERPL-MCNC: 4 MG/DL — LOW (ref 7–23)
CALCIUM SERPL-MCNC: 9 MG/DL — SIGNIFICANT CHANGE UP (ref 8.4–10.5)
CHLORIDE SERPL-SCNC: 102 MMOL/L — SIGNIFICANT CHANGE UP (ref 96–108)
CO2 SERPL-SCNC: 23 MMOL/L — SIGNIFICANT CHANGE UP (ref 22–31)
CREAT SERPL-MCNC: 0.62 MG/DL — SIGNIFICANT CHANGE UP (ref 0.5–1.3)
GLUCOSE SERPL-MCNC: 81 MG/DL — SIGNIFICANT CHANGE UP (ref 70–99)
HCT VFR BLD CALC: 39.9 % — SIGNIFICANT CHANGE UP (ref 34.5–45)
HGB BLD-MCNC: 13.6 G/DL — SIGNIFICANT CHANGE UP (ref 11.5–15.5)
MCHC RBC-ENTMCNC: 32.5 PG — SIGNIFICANT CHANGE UP (ref 27–34)
MCHC RBC-ENTMCNC: 34.1 GM/DL — SIGNIFICANT CHANGE UP (ref 32–36)
MCV RBC AUTO: 95.2 FL — SIGNIFICANT CHANGE UP (ref 80–100)
PLATELET # BLD AUTO: 314 K/UL — SIGNIFICANT CHANGE UP (ref 150–400)
POTASSIUM SERPL-MCNC: 4.3 MMOL/L — SIGNIFICANT CHANGE UP (ref 3.5–5.3)
POTASSIUM SERPL-SCNC: 4.3 MMOL/L — SIGNIFICANT CHANGE UP (ref 3.5–5.3)
RBC # BLD: 4.19 M/UL — SIGNIFICANT CHANGE UP (ref 3.8–5.2)
RBC # FLD: 14.2 % — SIGNIFICANT CHANGE UP (ref 10.3–14.5)
SODIUM SERPL-SCNC: 139 MMOL/L — SIGNIFICANT CHANGE UP (ref 135–145)
WBC # BLD: 9.1 K/UL — SIGNIFICANT CHANGE UP (ref 3.8–10.5)
WBC # FLD AUTO: 9.1 K/UL — SIGNIFICANT CHANGE UP (ref 3.8–10.5)

## 2017-06-16 PROCEDURE — 76700 US EXAM ABDOM COMPLETE: CPT | Mod: 26

## 2017-06-16 RX ORDER — SUCRALFATE 1 G
1 TABLET ORAL EVERY 6 HOURS
Qty: 0 | Refills: 0 | Status: DISCONTINUED | OUTPATIENT
Start: 2017-06-16 | End: 2017-06-19

## 2017-06-16 RX ORDER — PANTOPRAZOLE SODIUM 20 MG/1
40 TABLET, DELAYED RELEASE ORAL EVERY 12 HOURS
Qty: 0 | Refills: 0 | Status: DISCONTINUED | OUTPATIENT
Start: 2017-06-16 | End: 2017-06-19

## 2017-06-16 RX ORDER — SODIUM CHLORIDE 9 MG/ML
1000 INJECTION INTRAMUSCULAR; INTRAVENOUS; SUBCUTANEOUS
Qty: 0 | Refills: 0 | Status: DISCONTINUED | OUTPATIENT
Start: 2017-06-16 | End: 2017-06-19

## 2017-06-16 RX ADMIN — HYDROMORPHONE HYDROCHLORIDE 0.5 MILLIGRAM(S): 2 INJECTION INTRAMUSCULAR; INTRAVENOUS; SUBCUTANEOUS at 22:02

## 2017-06-16 RX ADMIN — Medication 1 GRAM(S): at 23:50

## 2017-06-16 RX ADMIN — HYDROMORPHONE HYDROCHLORIDE 0.5 MILLIGRAM(S): 2 INJECTION INTRAMUSCULAR; INTRAVENOUS; SUBCUTANEOUS at 10:30

## 2017-06-16 RX ADMIN — HYDROMORPHONE HYDROCHLORIDE 0.5 MILLIGRAM(S): 2 INJECTION INTRAMUSCULAR; INTRAVENOUS; SUBCUTANEOUS at 09:58

## 2017-06-16 RX ADMIN — PANTOPRAZOLE SODIUM 40 MILLIGRAM(S): 20 TABLET, DELAYED RELEASE ORAL at 18:40

## 2017-06-16 RX ADMIN — Medication 1 GRAM(S): at 19:47

## 2017-06-16 RX ADMIN — HYDROMORPHONE HYDROCHLORIDE 0.5 MILLIGRAM(S): 2 INJECTION INTRAMUSCULAR; INTRAVENOUS; SUBCUTANEOUS at 15:34

## 2017-06-16 RX ADMIN — Medication 2 CAPSULE(S): at 18:35

## 2017-06-16 RX ADMIN — HYDROMORPHONE HYDROCHLORIDE 0.5 MILLIGRAM(S): 2 INJECTION INTRAMUSCULAR; INTRAVENOUS; SUBCUTANEOUS at 16:10

## 2017-06-16 NOTE — PROGRESS NOTE ADULT - PROBLEM SELECTOR PLAN 1
EGD: Mild gastritis.  CT abd/pelv shows normal pancreas, normal liver, no bowel obstruction  will c/w zofran 4mg IV q6h  c/w IVF  give maalox and pepcid  pain medication with tylenol, will try to avoid narcotics at this time  NPO  EGD today.

## 2017-06-16 NOTE — DIETITIAN INITIAL EVALUATION ADULT. - OTHER INFO
Pt seen for extended NPO/Clears. Pt admitted with abdominal pain, persistent nausea, emesis and diarrhea. Pt reports receiving solid food for dinner 6/14 with abdominal pain and diarrhea after. Pt now on clear liquid diet. States poor tolerance/intake of clear liquids, including jello. States NKFA. No hx of difficulty chewing/swallowing.

## 2017-06-16 NOTE — PROGRESS NOTE ADULT - ASSESSMENT
50 yo female with PMH of SLE, chronic pancreatitis, ITP s/p splenectomy who presents with acute on chronic abdominal pain, nausea/vomiting and diarrhea. No evidence of acute pancreatitis or acute abdominal pathology on labs or imaging. Pt underwent EGD on 6/14/17, which was unremarkable. Gastric and duodenal biopsies were unremarkable. Pt continues to reports abdominal pain, nausea and post prandial diarrhea. Likely viral gastroenteritis.     - Advance to full liquid diet today  - Follow-up abdominal US  - Will check stool culture, c diff, Giardia Ag, rotavirus Ag and norwalk-like virus Ag

## 2017-06-16 NOTE — PROGRESS NOTE ADULT - SUBJECTIVE AND OBJECTIVE BOX
Patient is a 51y old  Female who presents with a chief complaint of Abdominal pain and nausea (2017 02:43)      SUBJECTIVE / OVERNIGHT EVENTS:   Feels better.  Denies CP/SOB/Palpitation/HA.    MEDICATIONS  (STANDING):  amylase/lipase/protease  (CREON 12,000 Units) 2Capsule(s) Oral three times a day with meals  sodium chloride 0.9%. 1000milliLiter(s) IV Continuous <Continuous>  pantoprazole  Injectable 40milliGRAM(s) IV Push every 12 hours  sucralfate suspension 1Gram(s) Oral every 6 hours    MEDICATIONS  (PRN):  ondansetron Injectable 4milliGRAM(s) IV Push every 6 hours PRN Nausea and/or Vomiting  acetaminophen   Tablet. 500milliGRAM(s) Oral every 6 hours PRN Moderate Pain (4 - 6)  HYDROmorphone  Injectable 0.5milliGRAM(s) IV Push every 6 hours PRN Severe Pain (7 - 10)      Vital Signs Last 24 Hrs  T(C): 36.8, Max: 36.8 (-16 @ 05:33)  HR: 65 (60 - 91)  BP: 109/69 (109/69 - 135/81)  RR: 17 (17 - 18)  SpO2: 98% (94% - 99%)  Wt(kg): --  CAPILLARY BLOOD GLUCOSE    I&O's Summary  I & Os for 24h ending 2017 07:00  =============================================  IN: 840 ml / OUT: 0 ml / NET: 840 ml    I & Os for current day (as of 2017 02:30)  =============================================  IN: 0 ml / OUT: 300 ml / NET: -300 ml      PHYSICAL EXAM:  GENERAL: NAD, well-developed  HEAD:  Atraumatic, Normocephalic  EYES: EOMI, PERRLA, conjunctiva and sclera clear  NECK: Supple, No JVD  CHEST/LUNG: Clear to auscultation bilaterally; No wheeze  HEART: Regular rate and rhythm; No murmurs, rubs, or gallops  ABDOMEN: Soft, Nontender, Nondistended; Bowel sounds present  EXTREMITIES:  2+ Peripheral Pulses, No clubbing, cyanosis, or edema  PSYCH: AAOx3  NEUROLOGY: non-focal  SKIN: No rashes or lesions    LABS:                        13.6   9.10  )-----------( 314      ( 2017 07:50 )             39.9     06-16    139  |  102  |  4<L>  ----------------------------<  81  4.3   |  23  |  0.62    Ca    9.0      2017 08:42    TPro  7.1  /  Alb  4.1  /  TBili  0.6  /  DBili  x   /  AST  22  /  ALT  13  /  AlkPhos  57  06-15          Urinalysis Basic - ( 2017 23:13 )    Color: Yellow / Appearance: Clear / S.018 / pH: x  Gluc: x / Ketone: Large  / Bili: Negative / Urobili: 1.0 mg/dL   Blood: x / Protein: Negative mg/dL / Nitrite: Negative   Leuk Esterase: Negative / RBC: 6 /HPF / WBC 2 /HPF   Sq Epi: x / Non Sq Epi: 3 /HPF / Bacteria: Occasional      CAPILLARY BLOOD GLUCOSE     @ 23:20  Culture-urine --  Culture results   No growth  method type --  Organism --  Organism Identification --  Specimen source .Urine Clean Catch (Midstream)            @ 23:20  Culture blood --  Culture results   No growth  Gram stain --  Gram stain blood --  Method type --  Organism --  Organism identification --  Specimen source .Urine Clean Catch (Midstream)      RADIOLOGY & ADDITIONAL TESTS:    Imaging Personally Reviewed:    Consultant(s) Notes Reviewed:      Care Discussed with Consultants/Other Providers:

## 2017-06-16 NOTE — PROGRESS NOTE ADULT - SUBJECTIVE AND OBJECTIVE BOX
INTERVAL HPI/OVERNIGHT EVENTS:    SUBJECTIVE: The patient continues to report diffuse abdominal pain and nausea, which is worse with eating. Also has post prandial diarrhea. Pt is afebrile.      MEDICATIONS  (STANDING):  amylase/lipase/protease  (CREON 12,000 Units) 2Capsule(s) Oral three times a day with meals  pantoprazole    Tablet 40milliGRAM(s) Oral before breakfast  sodium chloride 0.9%. 1000milliLiter(s) IV Continuous <Continuous>    MEDICATIONS  (PRN):  aluminum hydroxide/magnesium hydroxide/simethicone Suspension 30milliLiter(s) Oral every 4 hours PRN Dyspepsia  ondansetron Injectable 4milliGRAM(s) IV Push every 6 hours PRN Nausea and/or Vomiting  acetaminophen   Tablet. 500milliGRAM(s) Oral every 6 hours PRN Moderate Pain (4 - 6)  HYDROmorphone  Injectable 0.5milliGRAM(s) IV Push every 6 hours PRN Severe Pain (7 - 10)      Vital Signs Last 24 Hrs  T(C): 36.8, Max: 36.8 (06-16 @ 01:14)  T(F): 98.3, Max: 98.3 (06-16 @ 01:14)  HR: 91 (60 - 91)  BP: 120/79 (120/79 - 133/85)  BP(mean): --  RR: 18 (17 - 18)  SpO2: 98% (94% - 100%)    PHYSICAL EXAM:  Constitutional: NAD, well-developed  Respiratory: CTA and P  Cardiovascular: S1 and S2, RRR, no M  Gastrointestinal: BS+, soft, mild epigastric tenderness without rebound or guarding  Extremities: No peripheral edema, neg clubing, cyanosis  Vascular: 2+ peripheral pulses      LABS:                        13.6   9.10  )-----------( 314      ( 16 Jun 2017 07:50 )             39.9     06-16    139  |  102  |  4<L>  ----------------------------<  81  4.3   |  23  |  0.62    Ca    9.0      16 Jun 2017 08:42    TPro  7.1  /  Alb  4.1  /  TBili  0.6  /  DBili  x   /  AST  22  /  ALT  13  /  AlkPhos  57  06-15        LIVER FUNCTIONS - ( 15 Barry 2017 08:15 )  Alb: 4.1 g/dL / Pro: 7.1 g/dL / ALK PHOS: 57 U/L / ALT: 13 U/L / AST: 22 U/L / GGT: x             RADIOLOGY & ADDITIONAL TESTS:  CT Abdomen/Pelvis:   FINDINGS:    LOWER CHEST: Bilateral breast implants partially imaged.    LIVER: Within normal limits.  BILE DUCTS: Stable prominence of intrahepatic bile ducts in the posterior   segment of the right hepatic lobe.  GALLBLADDER: Within normal limits.  SPLEEN: Status post splenectomy. Stable left upper quadrant nodule,   likely splenule.   PANCREAS: Within normal limits.  ADRENALS: Within normal limits.  KIDNEYS/URETERS: No hydronephrosis. Subcentimeter hypodense left lower   pole renal lesion, too small to characterize.    BLADDER: Within normal limits.  REPRODUCTIVE ORGANS: Status post hysterectomy. No adnexal masses.    BOWEL: No bowel obstruction. Appendix is normal.  PERITONEUM: No ascites.  VESSELS:  Within normal limits.  RETROPERITONEUM: No lymphadenopathy.   ABDOMINAL WALL: Within normal limits.  BONES: Within normal limits.    IMPRESSION: No bowel obstruction or evidence of acute bowel inflammation.

## 2017-06-16 NOTE — DIETITIAN INITIAL EVALUATION ADULT. - ENERGY NEEDS
Height: 66 inches, Weight: 136 pounds (stated). Noted with admission/dosing of 147 pounds, question accuracy and continue to monitor., BMI: 21.9 kg/m2 (per stated wt); IBW: 130 (+/-10%), %IBW: 105%  Pertinent Info: 50 yo female with PMH of SLE, chronic pancreatitis, ITP s/p splenectomy who presents with acute on chronic abdominal pain, nausea/vomiting and diarrhea. No evidence of acute pancreatitis or acute abdominal pathology on labs or imaging. Pt underwent EGD on 6/14/17, which was unremarkable. Gastric and duodenal biopsies were unremarkable. Pt tried regular diet last night but had recurrent abdominal pain and diarrhea. Likely has viral gastroenteritis. Unlikely bacterial infection given lack of fever, leukocytosis or significant CT findings per chart review. No edema, no pressure ulcers.

## 2017-06-17 LAB
ANION GAP SERPL CALC-SCNC: 19 MMOL/L — HIGH (ref 5–17)
APPEARANCE UR: CLEAR — SIGNIFICANT CHANGE UP
BASOPHILS # BLD AUTO: 0.02 K/UL — SIGNIFICANT CHANGE UP (ref 0–0.2)
BASOPHILS NFR BLD AUTO: 0.2 % — SIGNIFICANT CHANGE UP (ref 0–2)
BILIRUB UR-MCNC: NEGATIVE — SIGNIFICANT CHANGE UP
BUN SERPL-MCNC: 5 MG/DL — LOW (ref 7–23)
CALCIUM SERPL-MCNC: 8.9 MG/DL — SIGNIFICANT CHANGE UP (ref 8.4–10.5)
CHLORIDE SERPL-SCNC: 104 MMOL/L — SIGNIFICANT CHANGE UP (ref 96–108)
CO2 SERPL-SCNC: 19 MMOL/L — LOW (ref 22–31)
COLOR SPEC: YELLOW — SIGNIFICANT CHANGE UP
CREAT SERPL-MCNC: 0.68 MG/DL — SIGNIFICANT CHANGE UP (ref 0.5–1.3)
DIFF PNL FLD: ABNORMAL
EOSINOPHIL # BLD AUTO: 0.17 K/UL — SIGNIFICANT CHANGE UP (ref 0–0.5)
EOSINOPHIL NFR BLD AUTO: 1.8 % — SIGNIFICANT CHANGE UP (ref 0–6)
GLUCOSE SERPL-MCNC: 64 MG/DL — LOW (ref 70–99)
GLUCOSE UR QL: NEGATIVE MG/DL — SIGNIFICANT CHANGE UP
HCT VFR BLD CALC: 37.9 % — SIGNIFICANT CHANGE UP (ref 34.5–45)
HGB BLD-MCNC: 13.1 G/DL — SIGNIFICANT CHANGE UP (ref 11.5–15.5)
IMM GRANULOCYTES NFR BLD AUTO: 0.2 % — SIGNIFICANT CHANGE UP (ref 0–1.5)
KETONES UR-MCNC: ABNORMAL
LEUKOCYTE ESTERASE UR-ACNC: NEGATIVE — SIGNIFICANT CHANGE UP
LYMPHOCYTES # BLD AUTO: 2.88 K/UL — SIGNIFICANT CHANGE UP (ref 1–3.3)
LYMPHOCYTES # BLD AUTO: 30.2 % — SIGNIFICANT CHANGE UP (ref 13–44)
MCHC RBC-ENTMCNC: 32.8 PG — SIGNIFICANT CHANGE UP (ref 27–34)
MCHC RBC-ENTMCNC: 34.6 GM/DL — SIGNIFICANT CHANGE UP (ref 32–36)
MCV RBC AUTO: 95 FL — SIGNIFICANT CHANGE UP (ref 80–100)
MONOCYTES # BLD AUTO: 0.99 K/UL — HIGH (ref 0–0.9)
MONOCYTES NFR BLD AUTO: 10.4 % — SIGNIFICANT CHANGE UP (ref 2–14)
NEUTROPHILS # BLD AUTO: 5.46 K/UL — SIGNIFICANT CHANGE UP (ref 1.8–7.4)
NEUTROPHILS NFR BLD AUTO: 57.2 % — SIGNIFICANT CHANGE UP (ref 43–77)
NITRITE UR-MCNC: NEGATIVE — SIGNIFICANT CHANGE UP
PH UR: 6 — SIGNIFICANT CHANGE UP (ref 5–8)
PLATELET # BLD AUTO: 292 K/UL — SIGNIFICANT CHANGE UP (ref 150–400)
POTASSIUM SERPL-MCNC: 4.2 MMOL/L — SIGNIFICANT CHANGE UP (ref 3.5–5.3)
POTASSIUM SERPL-SCNC: 4.2 MMOL/L — SIGNIFICANT CHANGE UP (ref 3.5–5.3)
PROT UR-MCNC: NEGATIVE MG/DL — SIGNIFICANT CHANGE UP
RBC # BLD: 3.99 M/UL — SIGNIFICANT CHANGE UP (ref 3.8–5.2)
RBC # FLD: 13.9 % — SIGNIFICANT CHANGE UP (ref 10.3–14.5)
SODIUM SERPL-SCNC: 142 MMOL/L — SIGNIFICANT CHANGE UP (ref 135–145)
SP GR SPEC: 1.02 — SIGNIFICANT CHANGE UP (ref 1.01–1.02)
UROBILINOGEN FLD QL: 1 MG/DL — SIGNIFICANT CHANGE UP
WBC # BLD: 9.54 K/UL — SIGNIFICANT CHANGE UP (ref 3.8–10.5)
WBC # FLD AUTO: 9.54 K/UL — SIGNIFICANT CHANGE UP (ref 3.8–10.5)

## 2017-06-17 RX ADMIN — Medication 1 GRAM(S): at 06:01

## 2017-06-17 RX ADMIN — Medication 2 CAPSULE(S): at 13:52

## 2017-06-17 RX ADMIN — Medication 1 GRAM(S): at 23:16

## 2017-06-17 RX ADMIN — Medication 2 CAPSULE(S): at 18:05

## 2017-06-17 RX ADMIN — ONDANSETRON 4 MILLIGRAM(S): 8 TABLET, FILM COATED ORAL at 09:34

## 2017-06-17 RX ADMIN — PANTOPRAZOLE SODIUM 40 MILLIGRAM(S): 20 TABLET, DELAYED RELEASE ORAL at 06:01

## 2017-06-17 RX ADMIN — Medication 1 GRAM(S): at 18:05

## 2017-06-17 RX ADMIN — Medication 1 GRAM(S): at 13:51

## 2017-06-17 RX ADMIN — Medication 2 CAPSULE(S): at 09:31

## 2017-06-17 RX ADMIN — PANTOPRAZOLE SODIUM 40 MILLIGRAM(S): 20 TABLET, DELAYED RELEASE ORAL at 18:05

## 2017-06-17 NOTE — PROGRESS NOTE ADULT - SUBJECTIVE AND OBJECTIVE BOX
INTERVAL HPI/OVERNIGHT EVENTS:    Ongoing abdominal discomfort without nausea.  Post prandial bloating on liquids.  On [id number] lipase dose.   No diarrhea.  No fever or chills.  EGD negative.  Bx negative.  CT neg.  US neg.  No CT or lab evidence of pancreatitis.  Does have pancreatic divisum.  Also has SLE but not active, sed rate normal.    MEDICATIONS  (STANDING):  amylase/lipase/protease  (CREON 12,000 Units) 2Capsule(s) Oral three times a day with meals  sodium chloride 0.9%. 1000milliLiter(s) IV Continuous <Continuous>  pantoprazole  Injectable 40milliGRAM(s) IV Push every 12 hours  sucralfate suspension 1Gram(s) Oral every 6 hours    MEDICATIONS  (PRN):  ondansetron Injectable 4milliGRAM(s) IV Push every 6 hours PRN Nausea and/or Vomiting  acetaminophen   Tablet. 500milliGRAM(s) Oral every 6 hours PRN Moderate Pain (4 - 6)  HYDROmorphone  Injectable 0.5milliGRAM(s) IV Push every 6 hours PRN Severe Pain (7 - 10)      Allergies    No Known Allergies    Intolerances        Review of Systems:    General:  No wt loss, fevers, chills, night sweats,fatigue,   Eyes:  Good vision, no reported pain  ENT:  No sore throat, pain, runny nose, dysphagia  CV:  No pain, palpitatioins, hypo/hypertension  Resp:  No dyspnea, cough, tachypnea, wheezing  :  No pain, bleeding, incontinence, nocturia  Muscle:  No pain, weakness  Neuro:  No weakness, tingling, memory problems  Psych:  No fatigue, insomnia, mood problems, depression  Endocrine:  No polyuria, polydypsia, cold/heat intolerance  Heme:  No petechiae, ecchymosis, easy bruisability      Vital Signs Last 24 Hrs  T(C): 36.7, Max: 36.8 ( @ 02:05)  T(F): 98, Max: 98.2 ( @ 02:05)  HR: 65 (60 - 77)  BP: 155/99 (109/69 - 155/99)  BP(mean): --  RR: 18 (17 - 18)  SpO2: 99% (94% - 99%)    PHYSICAL EXAM:    Constitutional: NAD, well-developed  Neck: No LAD, supple  Respiratory: CTA and P  Cardiovascular: S1 and S2, RRR, no M  Gastrointestinal: BS+, soft, slight voluntary guarding but no rebound, no palpable mass,  Extremities: No peripheral edema, neg clubing, cyanosis  Vascular: 2+ peripheral pulses  Neurological: A/O x 3, no focal deficits  Psychiatric: Normal mood, normal affect  Skin: No rashes      LABS:                        13.1   9.54  )-----------( 292      ( 2017 08:36 )             37.9     06-    142  |  104  |  5<L>  ----------------------------<  64<L>  4.2   |  19<L>  |  0.68    Ca    8.9      2017 08:18        Urinalysis Basic - ( 2017 23:13 )    Color: Yellow / Appearance: Clear / S.018 / pH: x  Gluc: x / Ketone: Large  / Bili: Negative / Urobili: 1.0 mg/dL   Blood: x / Protein: Negative mg/dL / Nitrite: Negative   Leuk Esterase: Negative / RBC: 6 /HPF / WBC 2 /HPF   Sq Epi: x / Non Sq Epi: 3 /HPF / Bacteria: Occasional      LIVER FUNCTIONS - ( 15 Barry 2017 08:15 )  Alb: 4.1 g/dL / Pro: 7.1 g/dL / ALK PHOS: 57 U/L / ALT: 13 U/L / AST: 22 U/L / GGT: x             RADIOLOGY & ADDITIONAL TESTS:    Abdominal sonogram with GB normal.

## 2017-06-17 NOTE — PROGRESS NOTE ADULT - SUBJECTIVE AND OBJECTIVE BOX
Patient is a 51y old  Female who presents with a chief complaint of Abdominal pain and nausea (2017 02:43)      SUBJECTIVE / OVERNIGHT EVENTS:   Feels better.  Denies CP/SOB/Palpitation/HA.    MEDICATIONS  (STANDING):  amylase/lipase/protease  (CREON 12,000 Units) 2Capsule(s) Oral three times a day with meals  sodium chloride 0.9%. 1000milliLiter(s) IV Continuous <Continuous>  pantoprazole  Injectable 40milliGRAM(s) IV Push every 12 hours  sucralfate suspension 1Gram(s) Oral every 6 hours    MEDICATIONS  (PRN):  ondansetron Injectable 4milliGRAM(s) IV Push every 6 hours PRN Nausea and/or Vomiting  acetaminophen   Tablet. 500milliGRAM(s) Oral every 6 hours PRN Moderate Pain (4 - 6)  HYDROmorphone  Injectable 0.5milliGRAM(s) IV Push every 6 hours PRN Severe Pain (7 - 10)      Vital Signs Last 24 Hrs  T(C): 36.6, Max: 36.9 (- @ 17:00)  HR: 66 (64 - 69)  BP: 112/70 (109/69 - 155/99)  RR: 18 (17 - 18)  SpO2: 98% (97% - 100%)  Wt(kg): --  CAPILLARY BLOOD GLUCOSE    I&O's Summary  I & Os for 24h ending 2017 07:00  =============================================  IN: 1200 ml / OUT: 1500 ml / NET: -300 ml    I & Os for current day (as of 2017 00:10)  =============================================  IN: 900 ml / OUT: 0 ml / NET: 900 ml      PHYSICAL EXAM:  GENERAL: NAD, well-developed  HEAD:  Atraumatic, Normocephalic  EYES: EOMI, PERRLA, conjunctiva and sclera clear  NECK: Supple, No JVD  CHEST/LUNG: Clear to auscultation bilaterally; No wheeze  HEART: Regular rate and rhythm; No murmurs, rubs, or gallops  ABDOMEN: Soft, Nontender, Nondistended; Bowel sounds present  EXTREMITIES:  2+ Peripheral Pulses, No clubbing, cyanosis, or edema  PSYCH: AAOx3  NEUROLOGY: non-focal  SKIN: No rashes or lesions    LABS:                        13.1   9.54  )-----------( 292      ( 2017 08:36 )             37.9         142  |  104  |  5<L>  ----------------------------<  64<L>  4.2   |  19<L>  |  0.68    Ca    8.9      2017 08:18            Urinalysis Basic - ( 2017 23:13 )    Color: Yellow / Appearance: Clear / S.018 / pH: x  Gluc: x / Ketone: Large  / Bili: Negative / Urobili: 1.0 mg/dL   Blood: x / Protein: Negative mg/dL / Nitrite: Negative   Leuk Esterase: Negative / RBC: 6 /HPF / WBC 2 /HPF   Sq Epi: x / Non Sq Epi: 3 /HPF / Bacteria: Occasional      CAPILLARY BLOOD GLUCOSE     @ 23:20  Culture-urine --  Culture results   No growth  method type --  Organism --  Organism Identification --  Specimen source .Urine Clean Catch (Midstream)            @ 23:20  Culture blood --  Culture results   No growth  Gram stain --  Gram stain blood --  Method type --  Organism --  Organism identification --  Specimen source .Urine Clean Catch (Midstream)      RADIOLOGY & ADDITIONAL TESTS:    Imaging Personally Reviewed:    Consultant(s) Notes Reviewed:      Care Discussed with Consultants/Other Providers:

## 2017-06-17 NOTE — PROGRESS NOTE ADULT - ASSESSMENT
Young female with intermittent pain for last 2-3 years of presumed "chronic pancreatitis" and gets admitted 2x/year.  This has been her worse "attack" through no evidence of pancreatitis.  All studies negative.  Question if she has functional dyspepsia or IBS.    Rec:    Continue present course with clear liquids, PPI, pancreatic enzymes antiemetic.    Josemanuel Johnston MD

## 2017-06-18 DIAGNOSIS — E87.2 ACIDOSIS: ICD-10-CM

## 2017-06-18 LAB
ANION GAP SERPL CALC-SCNC: 15 MMOL/L — SIGNIFICANT CHANGE UP (ref 5–17)
BUN SERPL-MCNC: 4 MG/DL — LOW (ref 7–23)
CALCIUM SERPL-MCNC: 9.2 MG/DL — SIGNIFICANT CHANGE UP (ref 8.4–10.5)
CHLORIDE SERPL-SCNC: 103 MMOL/L — SIGNIFICANT CHANGE UP (ref 96–108)
CO2 SERPL-SCNC: 22 MMOL/L — SIGNIFICANT CHANGE UP (ref 22–31)
CREAT SERPL-MCNC: 0.76 MG/DL — SIGNIFICANT CHANGE UP (ref 0.5–1.3)
GLUCOSE SERPL-MCNC: 81 MG/DL — SIGNIFICANT CHANGE UP (ref 70–99)
HCT VFR BLD CALC: 39.3 % — SIGNIFICANT CHANGE UP (ref 34.5–45)
HGB BLD-MCNC: 13.2 G/DL — SIGNIFICANT CHANGE UP (ref 11.5–15.5)
MCHC RBC-ENTMCNC: 32.2 PG — SIGNIFICANT CHANGE UP (ref 27–34)
MCHC RBC-ENTMCNC: 33.6 GM/DL — SIGNIFICANT CHANGE UP (ref 32–36)
MCV RBC AUTO: 95.9 FL — SIGNIFICANT CHANGE UP (ref 80–100)
PLATELET # BLD AUTO: 304 K/UL — SIGNIFICANT CHANGE UP (ref 150–400)
POTASSIUM SERPL-MCNC: 4.1 MMOL/L — SIGNIFICANT CHANGE UP (ref 3.5–5.3)
POTASSIUM SERPL-SCNC: 4.1 MMOL/L — SIGNIFICANT CHANGE UP (ref 3.5–5.3)
RBC # BLD: 4.1 M/UL — SIGNIFICANT CHANGE UP (ref 3.8–5.2)
RBC # FLD: 14.4 % — SIGNIFICANT CHANGE UP (ref 10.3–14.5)
SODIUM SERPL-SCNC: 140 MMOL/L — SIGNIFICANT CHANGE UP (ref 135–145)
WBC # BLD: 11.89 K/UL — HIGH (ref 3.8–10.5)
WBC # FLD AUTO: 11.89 K/UL — HIGH (ref 3.8–10.5)

## 2017-06-18 RX ADMIN — Medication 2 CAPSULE(S): at 17:16

## 2017-06-18 RX ADMIN — Medication 1 GRAM(S): at 12:23

## 2017-06-18 RX ADMIN — Medication 2 CAPSULE(S): at 12:23

## 2017-06-18 RX ADMIN — PANTOPRAZOLE SODIUM 40 MILLIGRAM(S): 20 TABLET, DELAYED RELEASE ORAL at 17:16

## 2017-06-18 RX ADMIN — Medication 1 GRAM(S): at 05:37

## 2017-06-18 RX ADMIN — Medication 1 GRAM(S): at 17:16

## 2017-06-18 RX ADMIN — Medication 2 CAPSULE(S): at 08:54

## 2017-06-18 RX ADMIN — PANTOPRAZOLE SODIUM 40 MILLIGRAM(S): 20 TABLET, DELAYED RELEASE ORAL at 05:37

## 2017-06-19 VITALS
HEART RATE: 88 BPM | OXYGEN SATURATION: 96 % | RESPIRATION RATE: 18 BRPM | TEMPERATURE: 98 F | SYSTOLIC BLOOD PRESSURE: 116 MMHG | DIASTOLIC BLOOD PRESSURE: 77 MMHG

## 2017-06-19 LAB
ANION GAP SERPL CALC-SCNC: 14 MMOL/L — SIGNIFICANT CHANGE UP (ref 5–17)
BUN SERPL-MCNC: 9 MG/DL — SIGNIFICANT CHANGE UP (ref 7–23)
CALCIUM SERPL-MCNC: 9.6 MG/DL — SIGNIFICANT CHANGE UP (ref 8.4–10.5)
CHLORIDE SERPL-SCNC: 106 MMOL/L — SIGNIFICANT CHANGE UP (ref 96–108)
CO2 SERPL-SCNC: 26 MMOL/L — SIGNIFICANT CHANGE UP (ref 22–31)
CREAT SERPL-MCNC: 0.89 MG/DL — SIGNIFICANT CHANGE UP (ref 0.5–1.3)
CULTURE RESULTS: SIGNIFICANT CHANGE UP
CULTURE RESULTS: SIGNIFICANT CHANGE UP
GLUCOSE SERPL-MCNC: 99 MG/DL — SIGNIFICANT CHANGE UP (ref 70–99)
HCT VFR BLD CALC: 39.4 % — SIGNIFICANT CHANGE UP (ref 34.5–45)
HGB BLD-MCNC: 13.5 G/DL — SIGNIFICANT CHANGE UP (ref 11.5–15.5)
MCHC RBC-ENTMCNC: 32.8 PG — SIGNIFICANT CHANGE UP (ref 27–34)
MCHC RBC-ENTMCNC: 34.3 GM/DL — SIGNIFICANT CHANGE UP (ref 32–36)
MCV RBC AUTO: 95.9 FL — SIGNIFICANT CHANGE UP (ref 80–100)
PLATELET # BLD AUTO: 307 K/UL — SIGNIFICANT CHANGE UP (ref 150–400)
POTASSIUM SERPL-MCNC: 4.8 MMOL/L — SIGNIFICANT CHANGE UP (ref 3.5–5.3)
POTASSIUM SERPL-SCNC: 4.8 MMOL/L — SIGNIFICANT CHANGE UP (ref 3.5–5.3)
RBC # BLD: 4.11 M/UL — SIGNIFICANT CHANGE UP (ref 3.8–5.2)
RBC # FLD: 14.6 % — HIGH (ref 10.3–14.5)
SODIUM SERPL-SCNC: 146 MMOL/L — HIGH (ref 135–145)
SPECIMEN SOURCE: SIGNIFICANT CHANGE UP
SPECIMEN SOURCE: SIGNIFICANT CHANGE UP
WBC # BLD: 9.7 K/UL — SIGNIFICANT CHANGE UP (ref 3.8–10.5)
WBC # FLD AUTO: 9.7 K/UL — SIGNIFICANT CHANGE UP (ref 3.8–10.5)

## 2017-06-19 PROCEDURE — 83735 ASSAY OF MAGNESIUM: CPT

## 2017-06-19 PROCEDURE — 76700 US EXAM ABDOM COMPLETE: CPT

## 2017-06-19 PROCEDURE — 82330 ASSAY OF CALCIUM: CPT

## 2017-06-19 PROCEDURE — 83615 LACTATE (LD) (LDH) ENZYME: CPT

## 2017-06-19 PROCEDURE — 80048 BASIC METABOLIC PNL TOTAL CA: CPT

## 2017-06-19 PROCEDURE — 88312 SPECIAL STAINS GROUP 1: CPT

## 2017-06-19 PROCEDURE — 81001 URINALYSIS AUTO W/SCOPE: CPT

## 2017-06-19 PROCEDURE — 84100 ASSAY OF PHOSPHORUS: CPT

## 2017-06-19 PROCEDURE — 96374 THER/PROPH/DIAG INJ IV PUSH: CPT | Mod: XU

## 2017-06-19 PROCEDURE — 82435 ASSAY OF BLOOD CHLORIDE: CPT

## 2017-06-19 PROCEDURE — 93005 ELECTROCARDIOGRAM TRACING: CPT

## 2017-06-19 PROCEDURE — 82947 ASSAY GLUCOSE BLOOD QUANT: CPT

## 2017-06-19 PROCEDURE — 87086 URINE CULTURE/COLONY COUNT: CPT

## 2017-06-19 PROCEDURE — 88305 TISSUE EXAM BY PATHOLOGIST: CPT

## 2017-06-19 PROCEDURE — 99285 EMERGENCY DEPT VISIT HI MDM: CPT | Mod: 25

## 2017-06-19 PROCEDURE — 87329 GIARDIA AG IA: CPT

## 2017-06-19 PROCEDURE — 85027 COMPLETE CBC AUTOMATED: CPT

## 2017-06-19 PROCEDURE — 85014 HEMATOCRIT: CPT

## 2017-06-19 PROCEDURE — 84295 ASSAY OF SERUM SODIUM: CPT

## 2017-06-19 PROCEDURE — 96375 TX/PRO/DX INJ NEW DRUG ADDON: CPT

## 2017-06-19 PROCEDURE — 87046 STOOL CULTR AEROBIC BACT EA: CPT

## 2017-06-19 PROCEDURE — 96376 TX/PRO/DX INJ SAME DRUG ADON: CPT

## 2017-06-19 PROCEDURE — 82803 BLOOD GASES ANY COMBINATION: CPT

## 2017-06-19 PROCEDURE — 74177 CT ABD & PELVIS W/CONTRAST: CPT

## 2017-06-19 PROCEDURE — 85652 RBC SED RATE AUTOMATED: CPT

## 2017-06-19 PROCEDURE — 84132 ASSAY OF SERUM POTASSIUM: CPT

## 2017-06-19 PROCEDURE — 87045 FECES CULTURE AEROBIC BACT: CPT

## 2017-06-19 PROCEDURE — 80053 COMPREHEN METABOLIC PANEL: CPT

## 2017-06-19 PROCEDURE — 87798 DETECT AGENT NOS DNA AMP: CPT

## 2017-06-19 PROCEDURE — 87425 ROTAVIRUS AG IA: CPT

## 2017-06-19 PROCEDURE — 83605 ASSAY OF LACTIC ACID: CPT

## 2017-06-19 PROCEDURE — 83690 ASSAY OF LIPASE: CPT

## 2017-06-19 RX ORDER — LIPASE/PROTEASE/AMYLASE 16-48-48K
2 CAPSULE,DELAYED RELEASE (ENTERIC COATED) ORAL
Qty: 180 | Refills: 0 | OUTPATIENT
Start: 2017-06-19 | End: 2017-07-19

## 2017-06-19 RX ORDER — PANTOPRAZOLE SODIUM 20 MG/1
1 TABLET, DELAYED RELEASE ORAL
Qty: 30 | Refills: 0 | OUTPATIENT
Start: 2017-06-19 | End: 2017-07-19

## 2017-06-19 RX ADMIN — Medication 1 GRAM(S): at 12:27

## 2017-06-19 RX ADMIN — Medication 2 CAPSULE(S): at 13:04

## 2017-06-19 RX ADMIN — Medication 2 CAPSULE(S): at 11:03

## 2017-06-19 RX ADMIN — Medication 1 GRAM(S): at 06:42

## 2017-06-19 NOTE — DISCHARGE NOTE ADULT - PLAN OF CARE
no abdominal pain Take your Creon and Protonix as ordered  Call Dr Avendaño, gastroenterology for follow up appointment next week  Return to emergency for worsened pain, fever, intractable vomiting, dizziness no progression To follow up with your physician Platelets 3.8  To follow up with your physician no bleeding or bruising

## 2017-06-19 NOTE — DISCHARGE NOTE ADULT - MEDICATION SUMMARY - MEDICATIONS TO TAKE
I will START or STAY ON the medications listed below when I get home from the hospital:    pancrelipase 12,000 units-38,000 units-60,000 units oral delayed release capsule  -- 2 cap(s) by mouth 3 times a day (with meals)  -- Indication: For Chronic pancreatitis    pantoprazole 40 mg oral delayed release tablet  -- 1 tab(s) by mouth once a day  -- It is very important that you take or use this exactly as directed.  Do not skip doses or discontinue unless directed by your doctor.  Obtain medical advice before taking any non-prescription drugs as some may affect the action of this medication.  Swallow whole.  Do not crush.    -- Indication: For Chronic pancreatitis

## 2017-06-19 NOTE — DISCHARGE NOTE ADULT - HOSPITAL COURSE
to be completed by attending 52 yo female with PMH of SLE, chronic pancreatitis, ITP s/p splenectomy, presents here with abdominal pain and nausea. Patient states that she ate lunch yesterday and started having abdominal pain and nausea after that.  She also felt she had abdominal bloating.  She says pain was located in the epigastric region, with no radiation.  She thought it was her usual pancreatitis.  She has not been able to eat or drink anything because kept having dry heaves.  She denies any vomiting.  Last bowel movement was yesterday morning.  She has not been passing flatus.  She also denies any fever, dysuria.  She noticed some blood in the urine yesterday.        6/12 CT A/P-No bowel obstruction or evidence of acute bowel inflammation.  normal liver and pancreas, stable prominence of biliary track.     6/13 	Acute abd pain and intractable vomiting- CT does not show pancreatitis, NPO, IVF 	NS@100cc/hr, GI consult pending  6/14  Acute abd pain: GI Dr Muller No evidence of acute pancreatitis. DDx include chronic           pancreatitis, peptic ulcer disease, H. pylori gastritis, celiac disease or functional           dyspepsia. upper endoscopy today is still pending. D5NS IVF, monitor BS  6/15: added IVF and changed diet to clears  after D/W DR Muller GI, ? Viral gastritis   ? d/c for 06/16 if symptom resolves   6/16 Acute gastritis - Pt with N/V, abd pain and bloating, unable to tolatere po. IVF NS@100cc/hr, Protonix IV BID,              Carafae 1g po q6h, GI following, Abd US- no gallstones.  6/19 pt feeling better and tolerating po, d/c home

## 2017-06-19 NOTE — DISCHARGE NOTE ADULT - MEDICATION SUMMARY - MEDICATIONS TO STOP TAKING
I will STOP taking the medications listed below when I get home from the hospital:    oxyCODONE 10 mg oral tablet  -- 1 tab(s) by mouth every 4 hours, As needed, Moderate Pain (4 - 6) MDD:6 tabs    Ceftin 500 mg oral tablet  -- 1 tab(s) by mouth every 12 hours to be started at 5pm tomorrow (7/22/16)  -- Finish all this medication unless otherwise directed by prescriber.  Medication should be taken with plenty of water.  Take with food or milk.    Colace 100 mg oral capsule  -- 1 cap(s) by mouth 3 times a day as needed for constipation    Innerclean oral tablet  -- 2 tab(s) by mouth once a day (at bedtime), as needed for constipation    Creon 24,000 units-76,000 units-120,000 units oral delayed release capsule  -- 1 cap(s) by mouth 3 times a day (with meals)

## 2017-06-19 NOTE — DISCHARGE NOTE ADULT - OTHER SIGNIFICANT FINDINGS
CT abdomen  IMPRESSION: No bowel obstruction or evidence of acute bowel inflammation.    endoscopy   Impression:          - Normal esophagus.                       - Z-line regular, 40 cm from the incisors.                       - Mild gastritis. Biopsied to rule out H. pylori.                       - Normal duodenal bulb and 2nd part of the duodenum. Biopsied to rule out                        celiac disease.                       - No clear etiology of abdominal pain found on this study.  Recommendation:                             - Low fat diet.                       - Creon with meals for chronic pancreatitis                       - Await pathology results.                       - The findings and recommendations were discussed with the patient.

## 2017-06-19 NOTE — DISCHARGE NOTE ADULT - CARE PLAN
Principal Discharge DX:	Other chronic pancreatitis  Goal:	no abdominal pain  Instructions for follow-up, activity and diet:	Take your Creon and Protonix as ordered  Call Dr Avendaño, gastroenterology for follow up appointment next week  Return to emergency for worsened pain, fever, intractable vomiting, dizziness  Secondary Diagnosis:	Lupus  Goal:	no progression  Instructions for follow-up, activity and diet:	To follow up with your physician  Secondary Diagnosis:	Idiopathic thrombocytopenia  Goal:	no bleeding or bruising  Instructions for follow-up, activity and diet:	Platelets 3.8  To follow up with your physician

## 2017-06-19 NOTE — DISCHARGE NOTE ADULT - PATIENT PORTAL LINK FT
“You can access the Follow[facility] Patient Portal, offered by [facility], by registering with the following website: [email or web address]”

## 2017-06-19 NOTE — DISCHARGE NOTE ADULT - CARE PROVIDER_API CALL
Kevin Chávez (ANA MARIA), Gastroenterology; Internal Medicine  [address] [facility] [address]  [place] [address]  Phone: [phone]  Fax: [phone]    Teodoro Vera (DO), Family Medicine  [address] [place] [id number]  Phone: [phone]  Fax: [phone]

## 2017-06-20 ENCOUNTER — APPOINTMENT (OUTPATIENT)
Dept: SURGERY | Facility: CLINIC | Age: 52
End: 2017-06-20

## 2017-06-20 ENCOUNTER — OTHER (OUTPATIENT)
Age: 52
End: 2017-06-20

## 2017-06-20 LAB
CULTURE RESULTS: SIGNIFICANT CHANGE UP
SPECIMEN SOURCE: SIGNIFICANT CHANGE UP

## 2017-06-21 LAB — NOROVIRUS GI AG STL QL: SIGNIFICANT CHANGE UP

## 2018-07-02 ENCOUNTER — TRANSCRIPTION ENCOUNTER (OUTPATIENT)
Age: 53
End: 2018-07-02

## 2018-08-31 ENCOUNTER — INPATIENT (INPATIENT)
Facility: HOSPITAL | Age: 53
LOS: 17 days | Discharge: HOME | DRG: 418 | End: 2018-09-18
Attending: INTERNAL MEDICINE | Admitting: INTERNAL MEDICINE
Payer: COMMERCIAL

## 2018-08-31 VITALS
HEIGHT: 66 IN | RESPIRATION RATE: 19 BRPM | WEIGHT: 138.01 LBS | HEART RATE: 96 BPM | DIASTOLIC BLOOD PRESSURE: 99 MMHG | TEMPERATURE: 98 F | SYSTOLIC BLOOD PRESSURE: 171 MMHG | OXYGEN SATURATION: 99 %

## 2018-08-31 DIAGNOSIS — Z90.81 ACQUIRED ABSENCE OF SPLEEN: Chronic | ICD-10-CM

## 2018-08-31 DIAGNOSIS — Z90.710 ACQUIRED ABSENCE OF BOTH CERVIX AND UTERUS: Chronic | ICD-10-CM

## 2018-08-31 DIAGNOSIS — K85.80 OTHER ACUTE PANCREATITIS WITHOUT NECROSIS OR INFECTION: ICD-10-CM

## 2018-08-31 DIAGNOSIS — K85.90 ACUTE PANCREATITIS WITHOUT NECROSIS OR INFECTION, UNSPECIFIED: ICD-10-CM

## 2018-08-31 DIAGNOSIS — R10.13 EPIGASTRIC PAIN: ICD-10-CM

## 2018-08-31 DIAGNOSIS — J06.9 ACUTE UPPER RESPIRATORY INFECTION, UNSPECIFIED: ICD-10-CM

## 2018-08-31 DIAGNOSIS — F17.200 NICOTINE DEPENDENCE, UNSPECIFIED, UNCOMPLICATED: ICD-10-CM

## 2018-08-31 DIAGNOSIS — L93.0 DISCOID LUPUS ERYTHEMATOSUS: ICD-10-CM

## 2018-08-31 LAB
ALBUMIN SERPL ELPH-MCNC: 4.5 G/DL — SIGNIFICANT CHANGE UP (ref 3.3–5)
ALP SERPL-CCNC: 60 U/L — SIGNIFICANT CHANGE UP (ref 40–120)
ALT FLD-CCNC: 12 U/L — SIGNIFICANT CHANGE UP (ref 10–45)
ANION GAP SERPL CALC-SCNC: 15 MMOL/L — SIGNIFICANT CHANGE UP (ref 5–17)
APPEARANCE UR: ABNORMAL
AST SERPL-CCNC: 17 U/L — SIGNIFICANT CHANGE UP (ref 10–40)
BACTERIA # UR AUTO: ABNORMAL /HPF
BASOPHILS # BLD AUTO: 0.2 K/UL — SIGNIFICANT CHANGE UP (ref 0–0.2)
BASOPHILS NFR BLD AUTO: 1.6 % — SIGNIFICANT CHANGE UP (ref 0–2)
BILIRUB SERPL-MCNC: 0.6 MG/DL — SIGNIFICANT CHANGE UP (ref 0.2–1.2)
BILIRUB UR-MCNC: NEGATIVE — SIGNIFICANT CHANGE UP
BUN SERPL-MCNC: 10 MG/DL — SIGNIFICANT CHANGE UP (ref 7–23)
CALCIUM SERPL-MCNC: 9.5 MG/DL — SIGNIFICANT CHANGE UP (ref 8.4–10.5)
CHLORIDE SERPL-SCNC: 104 MMOL/L — SIGNIFICANT CHANGE UP (ref 96–108)
CO2 SERPL-SCNC: 23 MMOL/L — SIGNIFICANT CHANGE UP (ref 22–31)
COLOR SPEC: YELLOW — SIGNIFICANT CHANGE UP
CREAT SERPL-MCNC: 0.74 MG/DL — SIGNIFICANT CHANGE UP (ref 0.5–1.3)
DIFF PNL FLD: NEGATIVE — SIGNIFICANT CHANGE UP
EOSINOPHIL # BLD AUTO: 0.2 K/UL — SIGNIFICANT CHANGE UP (ref 0–0.5)
EOSINOPHIL NFR BLD AUTO: 1.9 % — SIGNIFICANT CHANGE UP (ref 0–6)
GLUCOSE SERPL-MCNC: 89 MG/DL — SIGNIFICANT CHANGE UP (ref 70–99)
GLUCOSE UR QL: NEGATIVE — SIGNIFICANT CHANGE UP
HCT VFR BLD CALC: 47.9 % — HIGH (ref 34.5–45)
HGB BLD-MCNC: 16 G/DL — HIGH (ref 11.5–15.5)
KETONES UR-MCNC: ABNORMAL
LEUKOCYTE ESTERASE UR-ACNC: NEGATIVE — SIGNIFICANT CHANGE UP
LIDOCAIN IGE QN: 34 U/L — SIGNIFICANT CHANGE UP (ref 7–60)
LYMPHOCYTES # BLD AUTO: 3.8 K/UL — HIGH (ref 1–3.3)
LYMPHOCYTES # BLD AUTO: 34.9 % — SIGNIFICANT CHANGE UP (ref 13–44)
MCHC RBC-ENTMCNC: 33 PG — SIGNIFICANT CHANGE UP (ref 27–34)
MCHC RBC-ENTMCNC: 33.3 GM/DL — SIGNIFICANT CHANGE UP (ref 32–36)
MCV RBC AUTO: 99.2 FL — SIGNIFICANT CHANGE UP (ref 80–100)
MONOCYTES # BLD AUTO: 0.8 K/UL — SIGNIFICANT CHANGE UP (ref 0–0.9)
MONOCYTES NFR BLD AUTO: 7.5 % — SIGNIFICANT CHANGE UP (ref 2–14)
NEUTROPHILS # BLD AUTO: 5.9 K/UL — SIGNIFICANT CHANGE UP (ref 1.8–7.4)
NEUTROPHILS NFR BLD AUTO: 54 % — SIGNIFICANT CHANGE UP (ref 43–77)
NITRITE UR-MCNC: NEGATIVE — SIGNIFICANT CHANGE UP
PH UR: 5.5 — SIGNIFICANT CHANGE UP (ref 5–8)
PLATELET # BLD AUTO: 381 K/UL — SIGNIFICANT CHANGE UP (ref 150–400)
POTASSIUM SERPL-MCNC: 3.6 MMOL/L — SIGNIFICANT CHANGE UP (ref 3.5–5.3)
POTASSIUM SERPL-SCNC: 3.6 MMOL/L — SIGNIFICANT CHANGE UP (ref 3.5–5.3)
PROT SERPL-MCNC: 7.5 G/DL — SIGNIFICANT CHANGE UP (ref 6–8.3)
PROT UR-MCNC: SIGNIFICANT CHANGE UP
RBC # BLD: 4.83 M/UL — SIGNIFICANT CHANGE UP (ref 3.8–5.2)
RBC # FLD: 12.7 % — SIGNIFICANT CHANGE UP (ref 10.3–14.5)
RBC CASTS # UR COMP ASSIST: SIGNIFICANT CHANGE UP /HPF (ref 0–2)
SODIUM SERPL-SCNC: 142 MMOL/L — SIGNIFICANT CHANGE UP (ref 135–145)
SP GR SPEC: 1.03 — HIGH (ref 1.01–1.02)
UROBILINOGEN FLD QL: 1 MG/DL
WBC # BLD: 10.9 K/UL — HIGH (ref 3.8–10.5)
WBC # FLD AUTO: 10.9 K/UL — HIGH (ref 3.8–10.5)
WBC UR QL: SIGNIFICANT CHANGE UP /HPF (ref 0–5)

## 2018-08-31 PROCEDURE — 99285 EMERGENCY DEPT VISIT HI MDM: CPT | Mod: 25

## 2018-08-31 PROCEDURE — 71046 X-RAY EXAM CHEST 2 VIEWS: CPT | Mod: 26

## 2018-08-31 PROCEDURE — 99223 1ST HOSP IP/OBS HIGH 75: CPT | Mod: 25

## 2018-08-31 PROCEDURE — 99406 BEHAV CHNG SMOKING 3-10 MIN: CPT

## 2018-08-31 PROCEDURE — 93010 ELECTROCARDIOGRAM REPORT: CPT | Mod: 59

## 2018-08-31 RX ORDER — MAGNESIUM SULFATE 500 MG/ML
2 VIAL (ML) INJECTION ONCE
Qty: 0 | Refills: 0 | Status: COMPLETED | OUTPATIENT
Start: 2018-08-31 | End: 2018-08-31

## 2018-08-31 RX ORDER — PANTOPRAZOLE SODIUM 20 MG/1
40 TABLET, DELAYED RELEASE ORAL DAILY
Qty: 0 | Refills: 0 | Status: DISCONTINUED | OUTPATIENT
Start: 2018-08-31 | End: 2018-09-07

## 2018-08-31 RX ORDER — ONDANSETRON 8 MG/1
4 TABLET, FILM COATED ORAL EVERY 6 HOURS
Qty: 0 | Refills: 0 | Status: DISCONTINUED | OUTPATIENT
Start: 2018-08-31 | End: 2018-09-07

## 2018-08-31 RX ORDER — NICOTINE POLACRILEX 2 MG
4 GUM BUCCAL EVERY 6 HOURS
Qty: 0 | Refills: 0 | Status: DISCONTINUED | OUTPATIENT
Start: 2018-08-31 | End: 2018-09-07

## 2018-08-31 RX ORDER — HYDROMORPHONE HYDROCHLORIDE 2 MG/ML
0.5 INJECTION INTRAMUSCULAR; INTRAVENOUS; SUBCUTANEOUS
Qty: 0 | Refills: 0 | Status: DISCONTINUED | OUTPATIENT
Start: 2018-08-31 | End: 2018-09-07

## 2018-08-31 RX ORDER — MAGNESIUM SULFATE 500 MG/ML
2 VIAL (ML) INJECTION ONCE
Qty: 0 | Refills: 0 | Status: DISCONTINUED | OUTPATIENT
Start: 2018-08-31 | End: 2018-08-31

## 2018-08-31 RX ORDER — HYDROMORPHONE HYDROCHLORIDE 2 MG/ML
1 INJECTION INTRAMUSCULAR; INTRAVENOUS; SUBCUTANEOUS
Qty: 0 | Refills: 0 | Status: DISCONTINUED | OUTPATIENT
Start: 2018-08-31 | End: 2018-09-07

## 2018-08-31 RX ORDER — ENOXAPARIN SODIUM 100 MG/ML
40 INJECTION SUBCUTANEOUS EVERY 24 HOURS
Qty: 0 | Refills: 0 | Status: DISCONTINUED | OUTPATIENT
Start: 2018-08-31 | End: 2018-09-07

## 2018-08-31 RX ORDER — SODIUM CHLORIDE 9 MG/ML
1000 INJECTION, SOLUTION INTRAVENOUS ONCE
Qty: 0 | Refills: 0 | Status: COMPLETED | OUTPATIENT
Start: 2018-08-31 | End: 2018-08-31

## 2018-08-31 RX ORDER — ONDANSETRON 8 MG/1
4 TABLET, FILM COATED ORAL ONCE
Qty: 0 | Refills: 0 | Status: COMPLETED | OUTPATIENT
Start: 2018-08-31 | End: 2018-08-31

## 2018-08-31 RX ORDER — NICOTINE POLACRILEX 2 MG
1 GUM BUCCAL DAILY
Qty: 0 | Refills: 0 | Status: DISCONTINUED | OUTPATIENT
Start: 2018-08-31 | End: 2018-09-07

## 2018-08-31 RX ORDER — HYDROMORPHONE HYDROCHLORIDE 2 MG/ML
1 INJECTION INTRAMUSCULAR; INTRAVENOUS; SUBCUTANEOUS ONCE
Qty: 0 | Refills: 0 | Status: DISCONTINUED | OUTPATIENT
Start: 2018-08-31 | End: 2018-08-31

## 2018-08-31 RX ORDER — SODIUM CHLORIDE 9 MG/ML
1000 INJECTION, SOLUTION INTRAVENOUS
Qty: 0 | Refills: 0 | Status: COMPLETED | OUTPATIENT
Start: 2018-08-31 | End: 2018-09-01

## 2018-08-31 RX ADMIN — SODIUM CHLORIDE 1000 MILLILITER(S): 9 INJECTION, SOLUTION INTRAVENOUS at 18:05

## 2018-08-31 RX ADMIN — PANTOPRAZOLE SODIUM 40 MILLIGRAM(S): 20 TABLET, DELAYED RELEASE ORAL at 22:11

## 2018-08-31 RX ADMIN — HYDROMORPHONE HYDROCHLORIDE 1 MILLIGRAM(S): 2 INJECTION INTRAMUSCULAR; INTRAVENOUS; SUBCUTANEOUS at 20:38

## 2018-08-31 RX ADMIN — ONDANSETRON 4 MILLIGRAM(S): 8 TABLET, FILM COATED ORAL at 17:45

## 2018-08-31 RX ADMIN — Medication 2 GRAM(S): at 20:38

## 2018-08-31 RX ADMIN — HYDROMORPHONE HYDROCHLORIDE 1 MILLIGRAM(S): 2 INJECTION INTRAMUSCULAR; INTRAVENOUS; SUBCUTANEOUS at 17:45

## 2018-08-31 RX ADMIN — HYDROMORPHONE HYDROCHLORIDE 1 MILLIGRAM(S): 2 INJECTION INTRAMUSCULAR; INTRAVENOUS; SUBCUTANEOUS at 22:11

## 2018-08-31 RX ADMIN — SODIUM CHLORIDE 1000 MILLILITER(S): 9 INJECTION, SOLUTION INTRAVENOUS at 20:38

## 2018-08-31 RX ADMIN — Medication 50 GRAM(S): at 19:55

## 2018-08-31 NOTE — H&P ADULT - NSHPLABSRESULTS_GEN_ALL_CORE
Personally reviewed labs.   Personally reviewed imaging.                             16.0   10.9  )-----------( 381      ( 31 Aug 2018 17:42 )             47.9           142  |  104  |  10  ----------------------------<  89  3.6   |  23  |  0.74    Ca    9.5      31 Aug 2018 17:42    TPro  7.5  /  Alb  4.5  /  TBili  0.6  /  DBili  x   /  AST  17  /  ALT  12  /  AlkPhos  60              LIVER FUNCTIONS - ( 31 Aug 2018 17:42 )  Alb: 4.5 g/dL / Pro: 7.5 g/dL / ALK PHOS: 60 U/L / ALT: 12 U/L / AST: 17 U/L / GGT: x                 Urinalysis Basic - ( 31 Aug 2018 17:42 )    Color: Yellow / Appearance: SL Turbid / S.027 / pH: x  Gluc: x / Ketone: Moderate  / Bili: Negative / Urobili: 1 mg/dL   Blood: x / Protein: Trace / Nitrite: Negative   Leuk Esterase: Negative / RBC: 3-5 /HPF / WBC 3-5 /HPF   Sq Epi: x / Non Sq Epi: x / Bacteria: Few /HPF

## 2018-08-31 NOTE — ED ADULT NURSE NOTE - PSH
H/O bilateral breast implants    History of hysterectomy  secondary to endometriosis  History of splenectomy    S/P hernia repair  [facility]ral ( 1/5/2015 )  S/P hysterectomy  8 years ago- endometriosis  S/P splenectomy  about 20 years ago- ITP

## 2018-08-31 NOTE — H&P ADULT - NSHPREVIEWOFSYSTEMS_GEN_ALL_CORE
REVIEW OF SYSTEMS:    CONSTITUTIONAL: No weakness, fevers or chills  EYES/ENT: No visual changes;  No dysphagia  NECK: No pain or stiffness  RESPIRATORY: No cough, wheezing, hemoptysis; No shortness of breath  CARDIOVASCULAR: No chest pain or palpitations; No lower extremity edema  GASTROINTESTINAL: + epigastric pain. + nausea, no vomiting, or hematemesis; No diarrhea or constipation. +increased abdominal bloating; No melena or hematochezia.  BACK: No back pain  GENITOURINARY: No dysuria, frequency or hematuria  NEUROLOGICAL: No numbness or weakness  MUSCULOSKELETAL: no edema, no joint pain  SKIN: No itching, burning, rashes, or lesions   All other review of systems is negative unless indicated above. REVIEW OF SYSTEMS:  CONSTITUTIONAL: No weakness. No fevers. No chills. No rigors. No weight loss. +poor appetite.  EYES: No visual changes. No eye pain.  ENT: No hearing difficulty. No vertigo. No dysphagia. +Sinusitis/rhinorrhea.  NECK: No pain. No stiffness/rigidity.  CARDIAC: No chest pain. No palpitations.  RESPIRATORY: No cough. No SOB. No hemoptysis.  GASTROINTESTINAL: +abdominal pain. +nausea. No vomiting. No hematemesis. No diarrhea. No constipation. No melena. No hematochezia.  GENITOURINARY: No dysuria. No frequency. No hesitancy. No hematuria.  NEUROLOGICAL: No numbness/tingling. No focal weakness. No incontinence. No headache.  BACK: No back pain.  EXTREMITIES: No lower extremity edema. Full ROM.  SKIN: No rashes. No itching. No other lesions.  ALLERGIC: No lip swelling. No hives.  All other review of systems is negative unless indicated above.  Unless indicated above, unable to assess ROS 2/2

## 2018-08-31 NOTE — H&P ADULT - PROBLEM SELECTOR PLAN 3
- f/u with rheum as outpt - other than runny nose, sore throat and small dry cough for 1 day, pt is asymptomatic  - if pt becomes more symptomatic, and concerning clinically, may need to check for strep throat given the hx of splenectomy.

## 2018-08-31 NOTE — H&P ADULT - NSHPSOCIALHISTORY_GEN_ALL_CORE
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

## 2018-08-31 NOTE — H&P ADULT - PROBLEM SELECTOR PLAN 4
- offered nicotine alternatives, which were accepted  - explained long term consequences of smoking  - time spent 4 min - f/u with rheum as outpt

## 2018-08-31 NOTE — ED PROVIDER NOTE - OBJECTIVE STATEMENT
53yoF hx of SLE, asplenia with up to date vaccines, chronic pancreatitis pw epigastric pain for 3 days, severe, constant, burning, radiating to back, associated with nausea and decreased po intake. patient unable to tolerate po. 53yoF hx of SLE, asplenia with up to date vaccines, chronic pancreatitis pw epigastric pain for 3 days, severe, constant, burning, radiating to back, associated with nausea and decreased po intake. patient unable to tolerate po.  Dr. Chávez - [phone]

## 2018-08-31 NOTE — ED PROVIDER NOTE - PROGRESS NOTE DETAILS
tony: pt signed out to me from dr estevez, pmd gi dr perez, pt has been adm under dr jim service, will adm

## 2018-08-31 NOTE — ED ADULT NURSE NOTE - OBJECTIVE STATEMENT
53 yr old female a/o X 3 c/o abdominal pain and distention.  Pt has PMH of pancreatitis, c/o epigastric pain with nausea and unable to tolerate PO.  PERRL wnl, dove with equals trength.  LUNGS CTA no chest pain or sob.  Abdomen is tender in RUQ with BS+4Q.  Sclera is juandiced.  Skin wdi.  Peripheral pulses +2bl no edema

## 2018-08-31 NOTE — ED PROVIDER NOTE - FAMILY HISTORY
Family history of liver disease, liver cirrhosis     Family history of colon cancer     Grandparent  Still living? Unknown  Family history of breast cancer, Age at diagnosis: Age Unknown     Father  Still living? Yes, Estimated age: Age Unknown  Family history of colon cancer, Age at diagnosis: Age Unknown

## 2018-08-31 NOTE — H&P ADULT - PROBLEM SELECTOR PLAN 1
- cont LR volume repletion  - acute on chronic pancreatitis likely etiology of abd pain  - poss 2/2 pancreatic divisum.  - will get abdominal ultrasound to eval bile ducts  - no hx of etoh. will check lipids  - cont creon with meals  - low fat diet

## 2018-08-31 NOTE — H&P ADULT - HISTORY OF PRESENT ILLNESS
51F c hx SLE, chronic pancreatitis, ITP s/p splenectomy, pw epigastric pain.    Pt         presents here with abdominal pain and nausea. Patient states that she ate lunch yesterday and started having abdominal pain and nausea after that.  She also felt she had abdominal bloating.  She says pain was located in the epigastric region, with no radiation.  She thought it was her usual pancreatitis.  She has not been able to eat or drink anything because kept having dry heaves.  She denies any vomiting.  Last bowel movement was yesterday morning.  She has not been passing flatus.  She also denies any fever, dysuria.  She noticed some blood in the urine yesterday. 51F c hx SLE, chronic pancreatitis, ITP s/p splenectomy, current smoker, pw epigastric pain.    Pt states that this feels like all of her prior pancreatitis flares. Reports poor po intake over the past 3 days, which is when she started to have abdominal pain and nausea. Denies vomiting. Also reports some runny nose and coughing starting yesterday, but denies fevers, chills, chest pain, SOB. Last BM was today, normal quality. Pt does not take any meds for her lupus, but states it affects her joints and "all of her organs", and she gets intermittent flares.    VS: Tm 98, P 96, /99, R 19, 97% RA  In the ED, received dilaudid 1IV x2, Mg 2gm.

## 2018-08-31 NOTE — ED PROVIDER NOTE - MEDICAL DECISION MAKING DETAILS
female with acute on chronic pancreatitis. given location of pain will send trop, ekg, cxr and give meds, fluids and admit likely. patient unable to tolerate po at this time.

## 2018-08-31 NOTE — H&P ADULT - FAMILY HISTORY
Family history of liver disease, liver cirrhosis     Family history of colon cancer     Family history of colon cancer     Grandparent  Still living? Unknown  Family history of breast cancer, Age at diagnosis: Age Unknown

## 2018-08-31 NOTE — ED ADULT NURSE NOTE - PMH
Anxiety    Anxiety    Gastritis    Hernia    ITP (idiopathic thrombocytopenic purpura)    Lupus    Lupus    Pancreas divisum    Pancreatitis  6 months ago  Pancreatitis

## 2018-08-31 NOTE — H&P ADULT - PROBLEM SELECTOR PLAN 5
- offered nicotine alternatives, which were accepted  - explained long term consequences of smoking  - time spent 4 min

## 2018-08-31 NOTE — ED PROVIDER NOTE - ATTENDING CONTRIBUTION TO CARE
Attending MD Shah.  Agree with above. Pt is a 53 yr old feamle with hx of lupus on creon (panc enzyme), asplenia who presents to ED with 2-3 days severe worsening epigastric pain x 2-3 days.  Severe pain, nausea, radiates to back, feels same as reg pancreatitis.  She has not vomited but discontinued eating 2-3 days ago.  No fevers/EtOH/tylenol.  No changes to her meds.  No other med problems reported. UTD on vaccines.  Afebrile. Attending MD Shah.  Agree with above. Pt is a 53 yr old feamle with hx of lupus on creon (panc enzyme), asplenia who presents to ED with 2-3 days severe worsening epigastric pain x 2-3 days.  Severe pain, nausea, radiates to back, feels same as reg pancreatitis.  She has not vomited but discontinued eating 2-3 days ago.  No fevers/EtOH/tylenol.  No changes to her meds.  No other med problems reported. UTD on vaccines.  Afebrile.  Pt is quite tender but afebrile, A & O x 4, not ill appearing.  No active vomiting.  LDH elevated but rest of leila's criteria non-actionable.  Will defer CT abd/pelvis at this time given low mortality risk in young pt with similar presentations previously. Attending MD Shah.  Agree with above. Pt is a 53 yr old feamle with hx of lupus on creon (panc enzyme), asplenia who presents to ED with 2-3 days severe worsening epigastric pain x 2-3 days.  Severe pain, nausea, radiates to back, feels same as reg pancreatitis.  She has not vomited but discontinued eating 2-3 days ago.  No fevers/EtOH/tylenol.  No changes to her meds.  No other med problems reported. UTD on vaccines.  Afebrile.  Pt is quite tender but afebrile, A & O x 4, not ill appearing.  No active vomiting.  LDH elevated but rest of leila's criteria non-actionable.  Will defer CT abd/pelvis at this time given low mortality risk in young pt with similar presentations previously. Stable for admission for bowel rest.

## 2018-08-31 NOTE — H&P ADULT - PROBLEM SELECTOR PLAN 2
- cont dilaudid PRN, which pt states helps a lot with the pain. transition to po during day.  - start protonix to treat poss gerd/pud  - likely 2/2 pancreatitis as above

## 2018-08-31 NOTE — H&P ADULT - PMH
Anxiety    ITP (idiopathic thrombocytopenic purpura)    Lupus    Pancreas divisum    Pancreatitis Anxiety    Anxiety    Gastritis    Hernia    ITP (idiopathic thrombocytopenic purpura)    Lupus    Lupus    Pancreas divisum    Pancreatitis  6 months ago  Pancreatitis

## 2018-08-31 NOTE — H&P ADULT - PSH
History of hysterectomy  secondary to endometriosis  History of splenectomy H/O bilateral breast implants    History of hysterectomy  secondary to endometriosis  History of splenectomy    S/P hernia repair  Ventral ( 1/5/2015 )  S/P hysterectomy  8 years ago- endometriosis  S/P splenectomy  about 20 years ago- ITP

## 2018-08-31 NOTE — H&P ADULT - NSHPPHYSICALEXAM_GEN_ALL_CORE
GENERAL: No acute distress, well-developed  HEAD:  Atraumatic, Normocephalic  ENT: EOMI, PERRLA, conjunctiva and sclera clear, Neck supple, No JVD, dry mucosa  CHEST/LUNG: Clear to auscultation bilaterally; No wheeze, equal breath sounds bilaterally   BACK: No spinal tenderness, ROM intact  HEART: Regular rate and rhythm; No murmurs, rubs, or gallops  ABDOMEN: Soft, +epigastric tenderness; no rebound; Nondistended; Bowel sounds decreased  EXTREMITIES:  No clubbing, cyanosis, or edema  MSK: no joint effusion, tenderness/warmth, FROM  PSYCH: Normal behavior/affect  NEUROLOGY: AAOx3, non-focal, cranial nerves intact, moving all extremities  SKIN: Normal color, No rashes or lesions PHYSICAL EXAM:   GENERAL: Alert. Not confused. No acute distress. Not thin. Not cachectic. Not obese.  HEAD:  Atraumatic. Normocephalic.  EYES: EOMI. PERRLA. Normal conjunctiva/sclera.  ENT: Neck supple. No JVD. Moist oral mucosa. Not edentulous. No thrush.  LYMPH: Normal supraclavicular/cervical lymph nodes.   CARDIAC: Not tachy, Not cameron. Regular rhythm. Not irregularly irregular. S1. S2. No murmur. No rub. No distant heart sounds.  LUNG/CHEST: CTAB. BS equal bilaterally. No wheezes. No rales. No rhonchi.  ABDOMEN: Soft. +epigastric tenderness. No distension. No fluid wave. Normal bowel sounds.  BACK: No midline/vertebral tenderness. No flank tenderness.  VASCULAR: +2 b/l radial or ulnar pulses. Palpable DP pulses.  EXTREMITIES:  No clubbing. No cyanosis. No edema. Moving all 4.  NEUROLOGY: A&Ox3. Non-focal exam. Cranial nerves intact. Normal speech. Sensation intact.  PSYCH: Normal behavior. Normal affect.  SKIN: No jaundice. No erythema. No rash/lesion.  Vascular Access:     ICU Vital Signs Last 24 Hrs  T(C): 36.7 (31 Aug 2018 22:03), Max: 36.7 (31 Aug 2018 20:00)  T(F): 98 (31 Aug 2018 22:03), Max: 98 (31 Aug 2018 20:00)  HR: 74 (31 Aug 2018 22:03) (74 - 96)  BP: 155/65 (31 Aug 2018 22:03) (143/97 - 171/99)  BP(mean): --  ABP: --  ABP(mean): --  RR: 18 (31 Aug 2018 22:03) (18 - 19)  SpO2: 99% (31 Aug 2018 22:03) (97% - 99%)      I&O's Summary

## 2018-09-01 LAB
ALBUMIN SERPL ELPH-MCNC: 3.7 G/DL — SIGNIFICANT CHANGE UP (ref 3.3–5)
ALP SERPL-CCNC: 47 U/L — SIGNIFICANT CHANGE UP (ref 40–120)
ALT FLD-CCNC: 9 U/L — LOW (ref 10–45)
ANION GAP SERPL CALC-SCNC: 10 MMOL/L — SIGNIFICANT CHANGE UP (ref 5–17)
AST SERPL-CCNC: 14 U/L — SIGNIFICANT CHANGE UP (ref 10–40)
BASOPHILS # BLD AUTO: 0.1 K/UL — SIGNIFICANT CHANGE UP (ref 0–0.2)
BASOPHILS NFR BLD AUTO: 1.1 % — SIGNIFICANT CHANGE UP (ref 0–2)
BILIRUB SERPL-MCNC: 0.5 MG/DL — SIGNIFICANT CHANGE UP (ref 0.2–1.2)
BUN SERPL-MCNC: 9 MG/DL — SIGNIFICANT CHANGE UP (ref 7–23)
CALCIUM SERPL-MCNC: 8.8 MG/DL — SIGNIFICANT CHANGE UP (ref 8.4–10.5)
CHLORIDE SERPL-SCNC: 106 MMOL/L — SIGNIFICANT CHANGE UP (ref 96–108)
CHOLEST SERPL-MCNC: 160 MG/DL — SIGNIFICANT CHANGE UP (ref 10–199)
CO2 SERPL-SCNC: 24 MMOL/L — SIGNIFICANT CHANGE UP (ref 22–31)
CREAT SERPL-MCNC: 0.76 MG/DL — SIGNIFICANT CHANGE UP (ref 0.5–1.3)
EOSINOPHIL # BLD AUTO: 0.3 K/UL — SIGNIFICANT CHANGE UP (ref 0–0.5)
EOSINOPHIL NFR BLD AUTO: 2.4 % — SIGNIFICANT CHANGE UP (ref 0–6)
GLUCOSE SERPL-MCNC: 82 MG/DL — SIGNIFICANT CHANGE UP (ref 70–99)
HBA1C BLD-MCNC: 5.6 % — SIGNIFICANT CHANGE UP (ref 4–5.6)
HCT VFR BLD CALC: 40.5 % — SIGNIFICANT CHANGE UP (ref 34.5–45)
HDLC SERPL-MCNC: 58 MG/DL — SIGNIFICANT CHANGE UP
HGB BLD-MCNC: 13.4 G/DL — SIGNIFICANT CHANGE UP (ref 11.5–15.5)
LIPID PNL WITH DIRECT LDL SERPL: 87 MG/DL — SIGNIFICANT CHANGE UP
LYMPHOCYTES # BLD AUTO: 26.8 % — SIGNIFICANT CHANGE UP (ref 13–44)
LYMPHOCYTES # BLD AUTO: 3.6 K/UL — HIGH (ref 1–3.3)
MAGNESIUM SERPL-MCNC: 2.1 MG/DL — SIGNIFICANT CHANGE UP (ref 1.6–2.6)
MCHC RBC-ENTMCNC: 32.8 PG — SIGNIFICANT CHANGE UP (ref 27–34)
MCHC RBC-ENTMCNC: 33.1 GM/DL — SIGNIFICANT CHANGE UP (ref 32–36)
MCV RBC AUTO: 99.2 FL — SIGNIFICANT CHANGE UP (ref 80–100)
MONOCYTES # BLD AUTO: 1.1 K/UL — HIGH (ref 0–0.9)
MONOCYTES NFR BLD AUTO: 8.5 % — SIGNIFICANT CHANGE UP (ref 2–14)
NEUTROPHILS # BLD AUTO: 8.1 K/UL — HIGH (ref 1.8–7.4)
NEUTROPHILS NFR BLD AUTO: 61.2 % — SIGNIFICANT CHANGE UP (ref 43–77)
PHOSPHATE SERPL-MCNC: 3.6 MG/DL — SIGNIFICANT CHANGE UP (ref 2.5–4.5)
PLATELET # BLD AUTO: 321 K/UL — SIGNIFICANT CHANGE UP (ref 150–400)
POTASSIUM SERPL-MCNC: 4.6 MMOL/L — SIGNIFICANT CHANGE UP (ref 3.5–5.3)
POTASSIUM SERPL-SCNC: 4.6 MMOL/L — SIGNIFICANT CHANGE UP (ref 3.5–5.3)
PROT SERPL-MCNC: 6.1 G/DL — SIGNIFICANT CHANGE UP (ref 6–8.3)
RBC # BLD: 4.08 M/UL — SIGNIFICANT CHANGE UP (ref 3.8–5.2)
RBC # FLD: 12.5 % — SIGNIFICANT CHANGE UP (ref 10.3–14.5)
SODIUM SERPL-SCNC: 140 MMOL/L — SIGNIFICANT CHANGE UP (ref 135–145)
TOTAL CHOLESTEROL/HDL RATIO MEASUREMENT: 2.8 RATIO — LOW (ref 3.3–7.1)
TRIGL SERPL-MCNC: 74 MG/DL — SIGNIFICANT CHANGE UP (ref 10–149)
TSH SERPL-MCNC: 1.27 UIU/ML — SIGNIFICANT CHANGE UP (ref 0.27–4.2)
WBC # BLD: 13.2 K/UL — HIGH (ref 3.8–10.5)
WBC # FLD AUTO: 13.2 K/UL — HIGH (ref 3.8–10.5)

## 2018-09-01 PROCEDURE — 76700 US EXAM ABDOM COMPLETE: CPT | Mod: 26

## 2018-09-01 RX ORDER — INFLUENZA VIRUS VACCINE 15; 15; 15; 15 UG/.5ML; UG/.5ML; UG/.5ML; UG/.5ML
0.5 SUSPENSION INTRAMUSCULAR ONCE
Qty: 0 | Refills: 0 | Status: COMPLETED | OUTPATIENT
Start: 2018-09-01 | End: 2018-09-17

## 2018-09-01 RX ORDER — ALPRAZOLAM 0.25 MG
0.5 TABLET ORAL
Qty: 0 | Refills: 0 | Status: DISCONTINUED | OUTPATIENT
Start: 2018-09-01 | End: 2018-09-07

## 2018-09-01 RX ADMIN — HYDROMORPHONE HYDROCHLORIDE 1 MILLIGRAM(S): 2 INJECTION INTRAMUSCULAR; INTRAVENOUS; SUBCUTANEOUS at 00:50

## 2018-09-01 RX ADMIN — HYDROMORPHONE HYDROCHLORIDE 0.5 MILLIGRAM(S): 2 INJECTION INTRAMUSCULAR; INTRAVENOUS; SUBCUTANEOUS at 14:18

## 2018-09-01 RX ADMIN — ONDANSETRON 4 MILLIGRAM(S): 8 TABLET, FILM COATED ORAL at 10:09

## 2018-09-01 RX ADMIN — HYDROMORPHONE HYDROCHLORIDE 0.5 MILLIGRAM(S): 2 INJECTION INTRAMUSCULAR; INTRAVENOUS; SUBCUTANEOUS at 20:58

## 2018-09-01 RX ADMIN — HYDROMORPHONE HYDROCHLORIDE 0.5 MILLIGRAM(S): 2 INJECTION INTRAMUSCULAR; INTRAVENOUS; SUBCUTANEOUS at 21:08

## 2018-09-01 RX ADMIN — PANTOPRAZOLE SODIUM 40 MILLIGRAM(S): 20 TABLET, DELAYED RELEASE ORAL at 14:31

## 2018-09-01 RX ADMIN — HYDROMORPHONE HYDROCHLORIDE 1 MILLIGRAM(S): 2 INJECTION INTRAMUSCULAR; INTRAVENOUS; SUBCUTANEOUS at 00:19

## 2018-09-01 RX ADMIN — Medication 1 PATCH: at 14:19

## 2018-09-01 RX ADMIN — HYDROMORPHONE HYDROCHLORIDE 0.5 MILLIGRAM(S): 2 INJECTION INTRAMUSCULAR; INTRAVENOUS; SUBCUTANEOUS at 10:36

## 2018-09-01 RX ADMIN — HYDROMORPHONE HYDROCHLORIDE 0.5 MILLIGRAM(S): 2 INJECTION INTRAMUSCULAR; INTRAVENOUS; SUBCUTANEOUS at 10:22

## 2018-09-01 RX ADMIN — SODIUM CHLORIDE 150 MILLILITER(S): 9 INJECTION, SOLUTION INTRAVENOUS at 14:21

## 2018-09-01 RX ADMIN — HYDROMORPHONE HYDROCHLORIDE 0.5 MILLIGRAM(S): 2 INJECTION INTRAMUSCULAR; INTRAVENOUS; SUBCUTANEOUS at 14:33

## 2018-09-01 RX ADMIN — ONDANSETRON 4 MILLIGRAM(S): 8 TABLET, FILM COATED ORAL at 17:36

## 2018-09-01 RX ADMIN — HYDROMORPHONE HYDROCHLORIDE 1 MILLIGRAM(S): 2 INJECTION INTRAMUSCULAR; INTRAVENOUS; SUBCUTANEOUS at 05:40

## 2018-09-01 RX ADMIN — HYDROMORPHONE HYDROCHLORIDE 1 MILLIGRAM(S): 2 INJECTION INTRAMUSCULAR; INTRAVENOUS; SUBCUTANEOUS at 06:15

## 2018-09-01 RX ADMIN — SODIUM CHLORIDE 150 MILLILITER(S): 9 INJECTION, SOLUTION INTRAVENOUS at 01:50

## 2018-09-01 NOTE — CONSULT NOTE ADULT - SUBJECTIVE AND OBJECTIVE BOX
Patient is a 53y old  Female who presents with a chief complaint of Abdominal pain and nausea (31 Aug 2018 21:36)      HPI:  51F c hx SLE, chronic pancreatitis, ITP s/p splenectomy, current smoker, pw epigastric pain.    Pt states that this feels like all of her prior pancreatitis flares. Reports poor po intake over the past 3 days, which is when she started to have abdominal pain and nausea. Denies vomiting. Also reports some runny nose and coughing starting yesterday, but denies fevers, chills, chest pain, SOB. Last BM was today, normal quality. Pt does not take any meds for her lupus, but states it affects her joints and "all of her organs", and she gets intermittent flares. This flare she believes started due to stress when she had to ask her youngest daughter who is 23 years old to leave home due to interpersonal issues with in the family. Pain is now 4-5/10 and controlled with dilaudid. Nausea controlled with zofran.     VS: Tm 98, P 96, /99, R 19, 97% RA  In the ED, received dilaudid 1IV x2, Mg 2gm. (31 Aug 2018 21:36)      PAST MEDICAL & SURGICAL HISTORY:  Anxiety  Hernia  Gastritis  Lupus  Pancreatitis: 6 months ago  Lupus  Anxiety  ITP (idiopathic thrombocytopenic purpura)  Pancreas divisum  Pancreatitis  S/P hernia repair: Ventral ( 2015 )  H/O bilateral breast implants  S/P hysterectomy: 8 years ago- endometriosis  S/P splenectomy: about 20 years ago- ITP  History of hysterectomy: secondary to endometriosis  History of splenectomy      Allergies    No Known Allergies    Intolerances        MEDICATIONS  (STANDING):  enoxaparin Injectable 40 milliGRAM(s) SubCutaneous every 24 hours  influenza   Vaccine 0.5 milliLiter(s) IntraMuscular once  nicotine - 21 mG/24Hr(s) Patch 1 patch Transdermal daily  pantoprazole  Injectable 40 milliGRAM(s) IV Push daily    MEDICATIONS  (PRN):  HYDROmorphone  Injectable 0.5 milliGRAM(s) IV Push every 3 hours PRN Moderate Pain (4 - 6)  HYDROmorphone  Injectable 1 milliGRAM(s) IV Push every 3 hours PRN Severe Pain (7 - 10)  nicotine  Polacrilex Gum 4 milliGRAM(s) Oral every 6 hours PRN nicotine craving  nicotine  Polacrilex Lozenge 4 milliGRAM(s) Oral every 6 hours PRN nicotine craving  ondansetron Injectable 4 milliGRAM(s) IV Push every 6 hours PRN Nausea and/or Vomiting      Social History: no EtOH. + tobacco. 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 No drug abuse.     Family History: Non contributory    Review of Systems:  General:  No weight loss, fevers, chills, night sweats, fatigue,   CV:  No pain, palpitations, hypo/hypertension  Resp:  No dyspnea, cough, tachypnea, wheezing  GI:  No diarrhea, No constipatiion, No weight loss, No fever, No pruritis, No rectal bleeding, No tarry stools, No dysphagia,  :  No pain, bleeding, incontinence, nocturia  Muscle:  No pain, weakness  Neuro:  No weakness, tingling, memory problems  Psych:  No fatigue, insomnia, mood problems, depression  Endocrine:  No polyuria, polydypsia, cold/heat intolerance  Heme:  No petechiae, ecchymosis, easy bruisability  Skin:  No rash, tattoos, scars, edema    Vital Signs Last 24 Hrs  T(C): 37.1 (01 Sep 2018 17:32), Max: 37.1 (01 Sep 2018 17:32)  T(F): 98.7 (01 Sep 2018 17:32), Max: 98.7 (01 Sep 2018 17:32)  HR: 86 (01 Sep 2018 17:32) (67 - 86)  BP: 142/94 (01 Sep 2018 17:32) (119/70 - 156/83)  BP(mean): --  RR: 18 (01 Sep 2018 17:32) (18 - 18)  SpO2: 97% (01 Sep 2018 17:32) (95% - 99%)    PHYSICAL EXAM:  Constitutional:NAD, well-developed, alert and oriented  HEENT: no scleral icterus, no palpable lymph nodes  Cardiovascular: normal heart sounds   Respiratory: clear lungs  Gastrointestinal: soft, mild epigastric tenderness, nondistended, normal bowel sounds   Extremities: No peripheral edema  Skin: No rashes, jaundice    LABS:                        13.4   13.2  )-----------( 321      ( 01 Sep 2018 05:49 )             40.5     09-01    140  |  106  |  9   ----------------------------<  82  4.6   |  24  |  0.76    Ca    8.8      01 Sep 2018 05:49  Phos  3.6     -  Mg     2.1     -    TPro  6.1  /  Alb  3.7  /  TBili  0.5  /  DBili  x   /  AST  14  /  ALT  9<L>  /  AlkPhos  47        Urinalysis Basic - ( 31 Aug 2018 17:42 )    Color: Yellow / Appearance: SL Turbid / S.027 / pH: x  Gluc: x / Ketone: Moderate  / Bili: Negative / Urobili: 1 mg/dL   Blood: x / Protein: Trace / Nitrite: Negative   Leuk Esterase: Negative / RBC: 3-5 /HPF / WBC 3-5 /HPF   Sq Epi: x / Non Sq Epi: x / Bacteria: Few /HPF      LIVER FUNCTIONS - ( 01 Sep 2018 05:49 )  Alb: 3.7 g/dL / Pro: 6.1 g/dL / ALK PHOS: 47 U/L / ALT: 9 U/L / AST: 14 U/L / GGT: x             RADIOLOGY & ADDITIONAL TESTS:  Abd US: No CBD stone or dilation

## 2018-09-01 NOTE — CONSULT NOTE ADULT - ASSESSMENT
A/P: Acute on chronic pancreatitis. H/o pancreas divisum. Continue conservative management. Will add Xanax for anxiety and stress. Anticipate that she will improve with conservative Rx.

## 2018-09-01 NOTE — PROGRESS NOTE ADULT - SUBJECTIVE AND OBJECTIVE BOX
Patient is a 53y old  Female who presents with a chief complaint of Abdominal pain and nausea (31 Aug 2018 21:36)      SUBJECTIVE / OVERNIGHT EVENTS:   Feels better.  Denies CP/SOB/Palpitation/HA.    MEDICATIONS  (STANDING):  enoxaparin Injectable 40 milliGRAM(s) SubCutaneous every 24 hours  influenza   Vaccine 0.5 milliLiter(s) IntraMuscular once  nicotine - 21 mG/24Hr(s) Patch 1 patch Transdermal daily  pantoprazole  Injectable 40 milliGRAM(s) IV Push daily    MEDICATIONS  (PRN):  HYDROmorphone  Injectable 0.5 milliGRAM(s) IV Push every 3 hours PRN Moderate Pain (4 - 6)  HYDROmorphone  Injectable 1 milliGRAM(s) IV Push every 3 hours PRN Severe Pain (7 - 10)  nicotine  Polacrilex Gum 4 milliGRAM(s) Oral every 6 hours PRN nicotine craving  nicotine  Polacrilex Lozenge 4 milliGRAM(s) Oral every 6 hours PRN nicotine craving  ondansetron Injectable 4 milliGRAM(s) IV Push every 6 hours PRN Nausea and/or Vomiting        CAPILLARY BLOOD GLUCOSE        I&O's Summary      PHYSICAL EXAM:  GENERAL: NAD, well-developed  HEAD:  Atraumatic, Normocephalic  NECK: Supple, No JVD  CHEST/LUNG: Clear to auscultation bilaterally; No wheezing.  HEART: Regular rate and rhythm; No murmurs, rubs, or gallops  ABDOMEN: Soft, Nontender, Nondistended; Bowel sounds present  EXTREMITIES:   No clubbing, cyanosis, or edema  NEUROLOGY: AAO X 3  SKIN: No rashes    LABS:                        13.4   13.2  )-----------( 321      ( 01 Sep 2018 05:49 )             40.5     -    140  |  106  |  9   ----------------------------<  82  4.6   |  24  |  0.76    Ca    8.8      01 Sep 2018 05:49  Phos  3.6     -  Mg     2.1         TPro  6.1  /  Alb  3.7  /  TBili  0.5  /  DBili  x   /  AST  14  /  ALT  9<L>  /  AlkPhos  47            Urinalysis Basic - ( 31 Aug 2018 17:42 )    Color: Yellow / Appearance: SL Turbid / S.027 / pH: x  Gluc: x / Ketone: Moderate  / Bili: Negative / Urobili: 1 mg/dL   Blood: x / Protein: Trace / Nitrite: Negative   Leuk Esterase: Negative / RBC: 3-5 /HPF / WBC 3-5 /HPF   Sq Epi: x / Non Sq Epi: x / Bacteria: Few /HPF      CAPILLARY BLOOD GLUCOSE                    RADIOLOGY & ADDITIONAL TESTS:    Imaging Personally Reviewed:    Consultant(s) Notes Reviewed:      Care Discussed with Consultants/Other Providers:

## 2018-09-01 NOTE — PROGRESS NOTE ADULT - ASSESSMENT
· Assessment	  51F c hx SLE, chronic pancreatitis, ITP s/p splenectomy, current smoker, pw pancreatitis     Problem/Plan - 1:  ·  Problem: Other acute pancreatitis without infection or necrosis.  Plan: - cont LR volume repletion  - acute on chronic pancreatitis likely etiology of abd pain  -GI eval called     Problem/Plan - 2:  ·  Problem: Epigastric pain.  Plan: - cont dilaudid PRN, which pt states helps a lot with the pain. transition to po during day.  -  protonix to treat poss gerd/pud  - likely 2/2 pancreatitis as above.      Problem/Plan - 3:  ·  Problem: Viral upper respiratory tract infection. Plan: - other than runny nose, sore throat and small dry cough for 1 day, pt is asymptomatic  - if pt becomes more symptomatic, and concerning clinically, may need to check for strep throat given the hx of splenectomy.     Problem/Plan - 4:  ·  Problem: Lupus erythematosus, unspecified form. Plan: - f/u with rheum as outpt.    Dw family.

## 2018-09-02 LAB
ALBUMIN SERPL ELPH-MCNC: 4.2 G/DL — SIGNIFICANT CHANGE UP (ref 3.3–5)
ALP SERPL-CCNC: 55 U/L — SIGNIFICANT CHANGE UP (ref 40–120)
ALT FLD-CCNC: 11 U/L — SIGNIFICANT CHANGE UP (ref 10–45)
ANION GAP SERPL CALC-SCNC: 14 MMOL/L — SIGNIFICANT CHANGE UP (ref 5–17)
AST SERPL-CCNC: 23 U/L — SIGNIFICANT CHANGE UP (ref 10–40)
BILIRUB SERPL-MCNC: 0.7 MG/DL — SIGNIFICANT CHANGE UP (ref 0.2–1.2)
BUN SERPL-MCNC: 6 MG/DL — LOW (ref 7–23)
CALCIUM SERPL-MCNC: 9.5 MG/DL — SIGNIFICANT CHANGE UP (ref 8.4–10.5)
CHLORIDE SERPL-SCNC: 98 MMOL/L — SIGNIFICANT CHANGE UP (ref 96–108)
CO2 SERPL-SCNC: 24 MMOL/L — SIGNIFICANT CHANGE UP (ref 22–31)
CREAT SERPL-MCNC: 0.75 MG/DL — SIGNIFICANT CHANGE UP (ref 0.5–1.3)
GLUCOSE SERPL-MCNC: 78 MG/DL — SIGNIFICANT CHANGE UP (ref 70–99)
HCT VFR BLD CALC: 44.4 % — SIGNIFICANT CHANGE UP (ref 34.5–45)
HGB BLD-MCNC: 14.8 G/DL — SIGNIFICANT CHANGE UP (ref 11.5–15.5)
MCHC RBC-ENTMCNC: 32.9 PG — SIGNIFICANT CHANGE UP (ref 27–34)
MCHC RBC-ENTMCNC: 33.4 GM/DL — SIGNIFICANT CHANGE UP (ref 32–36)
MCV RBC AUTO: 98.6 FL — SIGNIFICANT CHANGE UP (ref 80–100)
PLATELET # BLD AUTO: 325 K/UL — SIGNIFICANT CHANGE UP (ref 150–400)
POTASSIUM SERPL-MCNC: 4.8 MMOL/L — SIGNIFICANT CHANGE UP (ref 3.5–5.3)
POTASSIUM SERPL-SCNC: 4.8 MMOL/L — SIGNIFICANT CHANGE UP (ref 3.5–5.3)
PROT SERPL-MCNC: 7 G/DL — SIGNIFICANT CHANGE UP (ref 6–8.3)
RBC # BLD: 4.51 M/UL — SIGNIFICANT CHANGE UP (ref 3.8–5.2)
RBC # FLD: 12.2 % — SIGNIFICANT CHANGE UP (ref 10.3–14.5)
SODIUM SERPL-SCNC: 136 MMOL/L — SIGNIFICANT CHANGE UP (ref 135–145)
WBC # BLD: 13.9 K/UL — HIGH (ref 3.8–10.5)
WBC # FLD AUTO: 13.9 K/UL — HIGH (ref 3.8–10.5)

## 2018-09-02 RX ORDER — SODIUM CHLORIDE 9 MG/ML
1000 INJECTION INTRAMUSCULAR; INTRAVENOUS; SUBCUTANEOUS
Qty: 0 | Refills: 0 | Status: DISCONTINUED | OUTPATIENT
Start: 2018-09-02 | End: 2018-09-03

## 2018-09-02 RX ORDER — ACETAMINOPHEN 500 MG
1000 TABLET ORAL ONCE
Qty: 0 | Refills: 0 | Status: COMPLETED | OUTPATIENT
Start: 2018-09-02 | End: 2018-09-02

## 2018-09-02 RX ADMIN — HYDROMORPHONE HYDROCHLORIDE 0.5 MILLIGRAM(S): 2 INJECTION INTRAMUSCULAR; INTRAVENOUS; SUBCUTANEOUS at 13:53

## 2018-09-02 RX ADMIN — HYDROMORPHONE HYDROCHLORIDE 0.5 MILLIGRAM(S): 2 INJECTION INTRAMUSCULAR; INTRAVENOUS; SUBCUTANEOUS at 16:17

## 2018-09-02 RX ADMIN — HYDROMORPHONE HYDROCHLORIDE 0.5 MILLIGRAM(S): 2 INJECTION INTRAMUSCULAR; INTRAVENOUS; SUBCUTANEOUS at 21:45

## 2018-09-02 RX ADMIN — PANTOPRAZOLE SODIUM 40 MILLIGRAM(S): 20 TABLET, DELAYED RELEASE ORAL at 11:54

## 2018-09-02 RX ADMIN — Medication 1000 MILLIGRAM(S): at 20:00

## 2018-09-02 RX ADMIN — ENOXAPARIN SODIUM 40 MILLIGRAM(S): 100 INJECTION SUBCUTANEOUS at 11:54

## 2018-09-02 RX ADMIN — HYDROMORPHONE HYDROCHLORIDE 0.5 MILLIGRAM(S): 2 INJECTION INTRAMUSCULAR; INTRAVENOUS; SUBCUTANEOUS at 06:27

## 2018-09-02 RX ADMIN — HYDROMORPHONE HYDROCHLORIDE 0.5 MILLIGRAM(S): 2 INJECTION INTRAMUSCULAR; INTRAVENOUS; SUBCUTANEOUS at 10:10

## 2018-09-02 RX ADMIN — HYDROMORPHONE HYDROCHLORIDE 0.5 MILLIGRAM(S): 2 INJECTION INTRAMUSCULAR; INTRAVENOUS; SUBCUTANEOUS at 14:23

## 2018-09-02 RX ADMIN — SODIUM CHLORIDE 100 MILLILITER(S): 9 INJECTION INTRAMUSCULAR; INTRAVENOUS; SUBCUTANEOUS at 11:47

## 2018-09-02 RX ADMIN — HYDROMORPHONE HYDROCHLORIDE 0.5 MILLIGRAM(S): 2 INJECTION INTRAMUSCULAR; INTRAVENOUS; SUBCUTANEOUS at 21:14

## 2018-09-02 RX ADMIN — HYDROMORPHONE HYDROCHLORIDE 0.5 MILLIGRAM(S): 2 INJECTION INTRAMUSCULAR; INTRAVENOUS; SUBCUTANEOUS at 10:40

## 2018-09-02 RX ADMIN — Medication 400 MILLIGRAM(S): at 19:31

## 2018-09-02 RX ADMIN — Medication 1 PATCH: at 11:54

## 2018-09-02 RX ADMIN — ONDANSETRON 4 MILLIGRAM(S): 8 TABLET, FILM COATED ORAL at 19:42

## 2018-09-02 NOTE — PROGRESS NOTE ADULT - ASSESSMENT
· Assessment	  51F c hx SLE, chronic pancreatitis, ITP s/p splenectomy, current smoker, pw pancreatitis     Problem/Plan - 1:  ·  Problem: Other acute pancreatitis without infection or necrosis.  Plan: -   - acute on chronic pancreatitis likely etiology of abd pain  - GI eval noted. Regular diet.     Problem/Plan - 2:  ·  Problem: Epigastric pain.  Plan: - cont dilaudid PRN, which pt states helps a lot with the pain. transition to po during day.  -  protonix to treat poss gerd/pud  - likely 2/2 pancreatitis as above.        Problem/Plan - 4:  ·  Problem: Lupus erythematosus, unspecified form. Plan: - f/u with rheum as outpt.

## 2018-09-02 NOTE — PROGRESS NOTE ADULT - SUBJECTIVE AND OBJECTIVE BOX
Patient is a 53y old  Female who presents with a chief complaint of Abdominal pain and nausea (31 Aug 2018 21:36)      SUBJECTIVE / OVERNIGHT EVENTS:   Mild abd pain  Denies CP/SOB/Palpitation/HA.    MEDICATIONS  (STANDING):  enoxaparin Injectable 40 milliGRAM(s) SubCutaneous every 24 hours  influenza   Vaccine 0.5 milliLiter(s) IntraMuscular once  nicotine - 21 mG/24Hr(s) Patch 1 patch Transdermal daily  pantoprazole  Injectable 40 milliGRAM(s) IV Push daily    MEDICATIONS  (PRN):  ALPRAZolam 0.5 milliGRAM(s) Oral two times a day PRN anxiety  HYDROmorphone  Injectable 0.5 milliGRAM(s) IV Push every 3 hours PRN Moderate Pain (4 - 6)  HYDROmorphone  Injectable 1 milliGRAM(s) IV Push every 3 hours PRN Severe Pain (7 - 10)  nicotine  Polacrilex Gum 4 milliGRAM(s) Oral every 6 hours PRN nicotine craving  nicotine  Polacrilex Lozenge 4 milliGRAM(s) Oral every 6 hours PRN nicotine craving  ondansetron Injectable 4 milliGRAM(s) IV Push every 6 hours PRN Nausea and/or Vomiting        CAPILLARY BLOOD GLUCOSE        I&O's Summary    02 Sep 2018 07:01  -  02 Sep 2018 10:32  --------------------------------------------------------  IN: 280 mL / OUT: 0 mL / NET: 280 mL        PHYSICAL EXAM:  GENERAL: NAD, well-developed  HEAD:  Atraumatic, Normocephalic  NECK: Supple, No JVD  CHEST/LUNG: Clear to auscultation bilaterally; No wheezing.  HEART: Regular rate and rhythm; No murmurs, rubs, or gallops  ABDOMEN: Soft, Nontender, Nondistended; Bowel sounds present  EXTREMITIES:   No clubbing, cyanosis, or edema  NEUROLOGY: AAO X 3  SKIN: No rashes    LABS:                        13.4   13.2  )-----------( 321      ( 01 Sep 2018 05:49 )             40.5     09-01    140  |  106  |  9   ----------------------------<  82  4.6   |  24  |  0.76    Ca    8.8      01 Sep 2018 05:49  Phos  3.6     -  Mg     2.1         TPro  6.1  /  Alb  3.7  /  TBili  0.5  /  DBili  x   /  AST  14  /  ALT  9<L>  /  AlkPhos  47            Urinalysis Basic - ( 31 Aug 2018 17:42 )    Color: Yellow / Appearance: SL Turbid / S.027 / pH: x  Gluc: x / Ketone: Moderate  / Bili: Negative / Urobili: 1 mg/dL   Blood: x / Protein: Trace / Nitrite: Negative   Leuk Esterase: Negative / RBC: 3-5 /HPF / WBC 3-5 /HPF   Sq Epi: x / Non Sq Epi: x / Bacteria: Few /HPF      CAPILLARY BLOOD GLUCOSE                    RADIOLOGY & ADDITIONAL TESTS:    Imaging Personally Reviewed:    Consultant(s) Notes Reviewed:      Care Discussed with Consultants/Other Providers:

## 2018-09-02 NOTE — CHART NOTE - NSCHARTNOTEFT_GEN_A_CORE
S= Pt admitted with pain and diagnosed S= Pt admitted with pain and diagnosed pancreatitis, complained of increased abdominal pain especially upon eating  HPI:  51F c hx SLE, chronic pancreatitis, ITP s/p splenectomy, current smoker, pw epigastric pain.    Pt states that this feels like all of her prior pancreatitis flares. Reports poor po intake over the past 3 days, which is when she started to have abdominal pain and nausea. Denies vomiting. Also reports some runny nose and coughing starting yesterday, but denies fevers, chills, chest pain, SOB. Last BM was today, normal quality. Pt does not take any meds for her lupus, but states it affects her joints and "all of her organs", and she gets intermittent flares.    VS: Tm 98, P 96, /99, R 19, 97% RA  In the ED, received dilaudid 1IV x2, Mg 2gm. (31 Aug 2018 21:36)      PE: awake and alert x3, still with abdominal pain and unable to eat  Vital Signs Last 24 Hrs  T(C): 36.7 (02 Sep 2018 10:16), Max: 37.1 (01 Sep 2018 17:32)  T(F): 98.1 (02 Sep 2018 10:16), Max: 98.7 (01 Sep 2018 17:32)  HR: 86 (02 Sep 2018 10:16) (78 - 86)  BP: 145/85 (02 Sep 2018 10:16) (123/81 - 156/83)  BP(mean): --  RR: 18 (02 Sep 2018 10:16) (18 - 18)  SpO2: 97% (02 Sep 2018 10:16) (96% - 98%)    Heent: acceptable but mucosa dry  CV: s1 s2 RRR  lungs : good air entry  GI: soft and but diffuse tenderness, healed abdominal scar   : voids in bathroom no dysuria   ext : no edema    Labs                      13.4   13.2  )-----------( 321      ( 01 Sep 2018 05:49 )             40.5   Comprehensive Metabolic Panel (09.01.18 @ 05:49)    Sodium, Serum: 140 mmol/L    Potassium, Serum: 4.6 mmol/L    Chloride, Serum: 106 mmol/L    Carbon Dioxide, Serum: 24 mmol/L    Anion Gap, Serum: 10 mmol/L    Blood Urea Nitrogen, Serum: 9 mg/dL    Creatinine, Serum: 0.76 mg/dL    Glucose, Serum: 82 mg/dL    Calcium, Total Serum: 8.8 mg/dL    Protein Total, Serum: 6.1 g/dL    Albumin, Serum: 3.7 g/dL    Bilirubin Total, Serum: 0.5 mg/dL    Alkaline Phosphatase, Serum: 47 U/L    Aspartate Aminotransferase (AST/SGOT): 14 U/L    Alanine Aminotransferase (ALT/SGPT): 9 U/L  A/P 51 yrs old female with lupus , admitted for pancreatitis now complaining of difficulty eating due to abdominal pain  Start ivf, consider of down grading diet but patient prefers to wait , will monitor throughout the day  monitor electrolytes

## 2018-09-03 LAB
ALBUMIN SERPL ELPH-MCNC: 3.7 G/DL — SIGNIFICANT CHANGE UP (ref 3.3–5)
ALP SERPL-CCNC: 50 U/L — SIGNIFICANT CHANGE UP (ref 40–120)
ALT FLD-CCNC: 11 U/L — SIGNIFICANT CHANGE UP (ref 10–45)
AMYLASE P1 CFR SERPL: 42 U/L — SIGNIFICANT CHANGE UP (ref 25–125)
ANION GAP SERPL CALC-SCNC: 13 MMOL/L — SIGNIFICANT CHANGE UP (ref 5–17)
AST SERPL-CCNC: 18 U/L — SIGNIFICANT CHANGE UP (ref 10–40)
BILIRUB SERPL-MCNC: 0.4 MG/DL — SIGNIFICANT CHANGE UP (ref 0.2–1.2)
BUN SERPL-MCNC: 4 MG/DL — LOW (ref 7–23)
CALCIUM SERPL-MCNC: 9 MG/DL — SIGNIFICANT CHANGE UP (ref 8.4–10.5)
CHLORIDE SERPL-SCNC: 103 MMOL/L — SIGNIFICANT CHANGE UP (ref 96–108)
CO2 SERPL-SCNC: 24 MMOL/L — SIGNIFICANT CHANGE UP (ref 22–31)
CREAT SERPL-MCNC: 0.74 MG/DL — SIGNIFICANT CHANGE UP (ref 0.5–1.3)
GLUCOSE SERPL-MCNC: 71 MG/DL — SIGNIFICANT CHANGE UP (ref 70–99)
LIDOCAIN IGE QN: 19 U/L — SIGNIFICANT CHANGE UP (ref 7–60)
POTASSIUM SERPL-MCNC: 4.9 MMOL/L — SIGNIFICANT CHANGE UP (ref 3.5–5.3)
POTASSIUM SERPL-SCNC: 4.9 MMOL/L — SIGNIFICANT CHANGE UP (ref 3.5–5.3)
PROT SERPL-MCNC: 6.4 G/DL — SIGNIFICANT CHANGE UP (ref 6–8.3)
SODIUM SERPL-SCNC: 140 MMOL/L — SIGNIFICANT CHANGE UP (ref 135–145)

## 2018-09-03 RX ORDER — ACETAMINOPHEN 500 MG
1000 TABLET ORAL ONCE
Qty: 0 | Refills: 0 | Status: COMPLETED | OUTPATIENT
Start: 2018-09-03 | End: 2018-09-03

## 2018-09-03 RX ORDER — SODIUM CHLORIDE 9 MG/ML
1000 INJECTION, SOLUTION INTRAVENOUS
Qty: 0 | Refills: 0 | Status: COMPLETED | OUTPATIENT
Start: 2018-09-03 | End: 2018-09-04

## 2018-09-03 RX ADMIN — HYDROMORPHONE HYDROCHLORIDE 1 MILLIGRAM(S): 2 INJECTION INTRAMUSCULAR; INTRAVENOUS; SUBCUTANEOUS at 17:33

## 2018-09-03 RX ADMIN — HYDROMORPHONE HYDROCHLORIDE 0.5 MILLIGRAM(S): 2 INJECTION INTRAMUSCULAR; INTRAVENOUS; SUBCUTANEOUS at 06:21

## 2018-09-03 RX ADMIN — HYDROMORPHONE HYDROCHLORIDE 1 MILLIGRAM(S): 2 INJECTION INTRAMUSCULAR; INTRAVENOUS; SUBCUTANEOUS at 15:23

## 2018-09-03 RX ADMIN — HYDROMORPHONE HYDROCHLORIDE 0.5 MILLIGRAM(S): 2 INJECTION INTRAMUSCULAR; INTRAVENOUS; SUBCUTANEOUS at 06:50

## 2018-09-03 RX ADMIN — HYDROMORPHONE HYDROCHLORIDE 1 MILLIGRAM(S): 2 INJECTION INTRAMUSCULAR; INTRAVENOUS; SUBCUTANEOUS at 21:03

## 2018-09-03 RX ADMIN — HYDROMORPHONE HYDROCHLORIDE 1 MILLIGRAM(S): 2 INJECTION INTRAMUSCULAR; INTRAVENOUS; SUBCUTANEOUS at 21:30

## 2018-09-03 RX ADMIN — Medication 1 PATCH: at 12:04

## 2018-09-03 RX ADMIN — ONDANSETRON 4 MILLIGRAM(S): 8 TABLET, FILM COATED ORAL at 17:33

## 2018-09-03 RX ADMIN — HYDROMORPHONE HYDROCHLORIDE 0.5 MILLIGRAM(S): 2 INJECTION INTRAMUSCULAR; INTRAVENOUS; SUBCUTANEOUS at 10:53

## 2018-09-03 RX ADMIN — Medication 1000 MILLIGRAM(S): at 19:20

## 2018-09-03 RX ADMIN — HYDROMORPHONE HYDROCHLORIDE 1 MILLIGRAM(S): 2 INJECTION INTRAMUSCULAR; INTRAVENOUS; SUBCUTANEOUS at 14:53

## 2018-09-03 RX ADMIN — HYDROMORPHONE HYDROCHLORIDE 1 MILLIGRAM(S): 2 INJECTION INTRAMUSCULAR; INTRAVENOUS; SUBCUTANEOUS at 18:03

## 2018-09-03 RX ADMIN — Medication 400 MILLIGRAM(S): at 18:50

## 2018-09-03 RX ADMIN — HYDROMORPHONE HYDROCHLORIDE 0.5 MILLIGRAM(S): 2 INJECTION INTRAMUSCULAR; INTRAVENOUS; SUBCUTANEOUS at 11:23

## 2018-09-03 RX ADMIN — SODIUM CHLORIDE 75 MILLILITER(S): 9 INJECTION, SOLUTION INTRAVENOUS at 12:05

## 2018-09-03 RX ADMIN — ENOXAPARIN SODIUM 40 MILLIGRAM(S): 100 INJECTION SUBCUTANEOUS at 12:04

## 2018-09-03 RX ADMIN — PANTOPRAZOLE SODIUM 40 MILLIGRAM(S): 20 TABLET, DELAYED RELEASE ORAL at 12:04

## 2018-09-03 NOTE — PROGRESS NOTE ADULT - ASSESSMENT
· Assessment	  51F c hx SLE, chronic pancreatitis, ITP s/p splenectomy, current smoker, pw pancreatitis     Problem/Plan - 1:  ·  Problem: Other acute pancreatitis without infection or necrosis.  Plan: -   - acute on chronic pancreatitis likely etiology of abd pain  - GI eval noted. Regular diet as tolerated.     Problem/Plan - 2:  ·  Problem: Epigastric pain.  Plan: - cont dilaudid PRN, which pt states helps a lot with the pain. transition to po during day.  -  protonix to treat poss gerd/pud  - likely 2/2 pancreatitis as above.        Problem/Plan - 4:  ·  Problem: Lupus erythematosus, unspecified form. Plan: - f/u with rheum as outpt.

## 2018-09-03 NOTE — PROGRESS NOTE ADULT - SUBJECTIVE AND OBJECTIVE BOX
SUBJECTIVE: Still with epigastric pain. No vomiting. Tolerating po low fat diet with mild increase in pain.     MEDICATIONS  (STANDING):  dextrose 5% + sodium chloride 0.45%. 1000 milliLiter(s) (75 mL/Hr) IV Continuous <Continuous>  enoxaparin Injectable 40 milliGRAM(s) SubCutaneous every 24 hours  influenza   Vaccine 0.5 milliLiter(s) IntraMuscular once  nicotine - 21 mG/24Hr(s) Patch 1 patch Transdermal daily  pantoprazole  Injectable 40 milliGRAM(s) IV Push daily    MEDICATIONS  (PRN):  ALPRAZolam 0.5 milliGRAM(s) Oral two times a day PRN anxiety  HYDROmorphone  Injectable 0.5 milliGRAM(s) IV Push every 3 hours PRN Moderate Pain (4 - 6)  HYDROmorphone  Injectable 1 milliGRAM(s) IV Push every 3 hours PRN Severe Pain (7 - 10)  nicotine  Polacrilex Gum 4 milliGRAM(s) Oral every 6 hours PRN nicotine craving  nicotine  Polacrilex Lozenge 4 milliGRAM(s) Oral every 6 hours PRN nicotine craving  ondansetron Injectable 4 milliGRAM(s) IV Push every 6 hours PRN Nausea and/or Vomiting      OBJECTIVE:  Vital Signs Last 24 Hrs  T(C): 36.6 (03 Sep 2018 14:25), Max: 36.8 (03 Sep 2018 05:31)  T(F): 97.9 (03 Sep 2018 14:25), Max: 98.2 (03 Sep 2018 05:31)  HR: 67 (03 Sep 2018 14:25) (65 - 75)  BP: 149/92 (03 Sep 2018 14:25) (113/693 - 149/92)  BP(mean): --  RR: 18 (03 Sep 2018 14:25) (18 - 18)  SpO2: 99% (03 Sep 2018 14:25) (95% - 99%)    PHYSICAL EXAM:  Constitutional: alert and oriented, in no acute distress  HEENT: no scleral icterus, no palpable lymph nodes  Cardiovascular: normal heart sounds  Respiratory: clear lungs  Gastrointestinal: soft, mild epigastric tenderness, nondistended, normal bowel sounds,   Extremities: No peripheral edema b/l     LABS:                        14.8   13.9  )-----------( 325      ( 02 Sep 2018 12:39 )             44.4     09-03    140  |  103  |  4<L>  ----------------------------<  71  4.9   |  24  |  0.74    Ca    9.0      03 Sep 2018 06:45    TPro  6.4  /  Alb  3.7  /  TBili  0.4  /  DBili  x   /  AST  18  /  ALT  11  /  AlkPhos  50  09-03        LIVER FUNCTIONS - ( 03 Sep 2018 06:45 )  Alb: 3.7 g/dL / Pro: 6.4 g/dL / ALK PHOS: 50 U/L / ALT: 11 U/L / AST: 18 U/L / GGT: x             RADIOLOGY & ADDITIONAL TESTS:

## 2018-09-03 NOTE — PROGRESS NOTE ADULT - ASSESSMENT
Acute on chronic pancreatitis: Improving slowly. Continue low fat diet MRI/MRCP tomorrow. Will follow.

## 2018-09-03 NOTE — PROGRESS NOTE ADULT - SUBJECTIVE AND OBJECTIVE BOX
Patient is a 53y old  Female who presents with a chief complaint of Abdominal pain and nausea (31 Aug 2018 21:36)      SUBJECTIVE / OVERNIGHT EVENTS:  Abd pain ++  Review of Systems:   CONSTITUTIONAL: No fever, weight loss, or fatigue  EYES: No eye pain, visual disturbances, or discharge  ENMT:  No difficulty hearing, tinnitus, vertigo; No sinus or throat pain  NECK: No pain or stiffness  BREASTS: No pain, masses, or nipple discharge  RESPIRATORY: No cough, wheezing, chills or hemoptysis; No shortness of breath  CARDIOVASCULAR: No chest pain, palpitations, dizziness, or leg swelling  GASTROINTESTINAL: No nausea, vomiting, or hematemesis; No diarrhea or constipation. No melena or hematochezia.  GENITOURINARY: No dysuria, frequency, hematuria, or incontinence  NEUROLOGICAL: No headaches, memory loss, loss of strength, numbness, or tremors  SKIN: No itching, burning, rashes, or lesions   LYMPH NODES: No enlarged glands  ENDOCRINE: No heat or cold intolerance; No hair loss  MUSCULOSKELETAL: No joint pain or swelling; No muscle, back, or extremity pain  PSYCHIATRIC: No depression, anxiety, mood swings, or difficulty sleeping  HEME/LYMPH: No easy bruising, or bleeding gums  ALLERY AND IMMUNOLOGIC: No hives or eczema    MEDICATIONS  (STANDING):  dextrose 5% + sodium chloride 0.45%. 1000 milliLiter(s) (75 mL/Hr) IV Continuous <Continuous>  enoxaparin Injectable 40 milliGRAM(s) SubCutaneous every 24 hours  influenza   Vaccine 0.5 milliLiter(s) IntraMuscular once  nicotine - 21 mG/24Hr(s) Patch 1 patch Transdermal daily  pantoprazole  Injectable 40 milliGRAM(s) IV Push daily    MEDICATIONS  (PRN):  ALPRAZolam 0.5 milliGRAM(s) Oral two times a day PRN anxiety  HYDROmorphone  Injectable 0.5 milliGRAM(s) IV Push every 3 hours PRN Moderate Pain (4 - 6)  HYDROmorphone  Injectable 1 milliGRAM(s) IV Push every 3 hours PRN Severe Pain (7 - 10)  nicotine  Polacrilex Gum 4 milliGRAM(s) Oral every 6 hours PRN nicotine craving  nicotine  Polacrilex Lozenge 4 milliGRAM(s) Oral every 6 hours PRN nicotine craving  ondansetron Injectable 4 milliGRAM(s) IV Push every 6 hours PRN Nausea and/or Vomiting      PHYSICAL EXAM:  Vital Signs Last 24 Hrs  T(C): 36.6 (03 Sep 2018 14:25), Max: 36.8 (03 Sep 2018 05:31)  T(F): 97.9 (03 Sep 2018 14:25), Max: 98.2 (03 Sep 2018 05:31)  HR: 67 (03 Sep 2018 14:25) (65 - 75)  BP: 149/92 (03 Sep 2018 14:25) (113/693 - 149/92)  BP(mean): --  RR: 18 (03 Sep 2018 14:25) (18 - 18)  SpO2: 99% (03 Sep 2018 14:25) (95% - 99%)  I&O's Summary    02 Sep 2018 07:01  -  03 Sep 2018 07:00  --------------------------------------------------------  IN: 2205 mL / OUT: 350 mL / NET: 1855 mL    03 Sep 2018 07:01  -  03 Sep 2018 14:43  --------------------------------------------------------  IN: 180 mL / OUT: 750 mL / NET: -570 mL      GENERAL: NAD, well-developed  HEAD:  Atraumatic, Normocephalic  EYES: EOMI, PERRLA, conjunctiva and sclera clear  NECK: Supple, No JVD  CHEST/LUNG: Clear to auscultation bilaterally; No wheeze  HEART: Regular rate and rhythm; No murmurs, rubs, or gallops  ABDOMEN: Soft, Nontender, Nondistended; Bowel sounds present  EXTREMITIES:  2+ Peripheral Pulses, No clubbing, cyanosis, or edema  PSYCH: AAOx3  NEUROLOGY: non-focal  SKIN: No rashes or lesions    LABS:  CAPILLARY BLOOD GLUCOSE                              14.8   13.9  )-----------( 325      ( 02 Sep 2018 12:39 )             44.4     09-03    140  |  103  |  4<L>  ----------------------------<  71  4.9   |  24  |  0.74    Ca    9.0      03 Sep 2018 06:45    TPro  6.4  /  Alb  3.7  /  TBili  0.4  /  DBili  x   /  AST  18  /  ALT  11  /  AlkPhos  50  09-03              RADIOLOGY & ADDITIONAL TESTS:    Imaging Personally Reviewed:    Consultant(s) Notes Reviewed:      Care Discussed with Consultants/Other Providers:

## 2018-09-03 NOTE — PROVIDER CONTACT NOTE (OTHER) - REASON
Pt c/o severe abd pain and vomiting Pt c/o severe abd pain and vomiting. Pts BP elevated 162/100 manual

## 2018-09-03 NOTE — PROVIDER CONTACT NOTE (OTHER) - ASSESSMENT
Everytime pt eats abd becomes distended with pain. Pt is now vomiting in bathroom. Everytime pt eats abd becomes distended with pain. Pt is now vomiting in bathroom.  Elevated BP

## 2018-09-04 LAB
ANION GAP SERPL CALC-SCNC: 13 MMOL/L — SIGNIFICANT CHANGE UP (ref 5–17)
BUN SERPL-MCNC: <4 MG/DL — LOW (ref 7–23)
CALCIUM SERPL-MCNC: 9.4 MG/DL — SIGNIFICANT CHANGE UP (ref 8.4–10.5)
CHLORIDE SERPL-SCNC: 102 MMOL/L — SIGNIFICANT CHANGE UP (ref 96–108)
CO2 SERPL-SCNC: 27 MMOL/L — SIGNIFICANT CHANGE UP (ref 22–31)
CREAT SERPL-MCNC: 0.71 MG/DL — SIGNIFICANT CHANGE UP (ref 0.5–1.3)
GLUCOSE SERPL-MCNC: 103 MG/DL — HIGH (ref 70–99)
HCT VFR BLD CALC: 41.6 % — SIGNIFICANT CHANGE UP (ref 34.5–45)
HGB BLD-MCNC: 14.5 G/DL — SIGNIFICANT CHANGE UP (ref 11.5–15.5)
MCHC RBC-ENTMCNC: 33.3 PG — SIGNIFICANT CHANGE UP (ref 27–34)
MCHC RBC-ENTMCNC: 34.9 GM/DL — SIGNIFICANT CHANGE UP (ref 32–36)
MCV RBC AUTO: 95.6 FL — SIGNIFICANT CHANGE UP (ref 80–100)
PLATELET # BLD AUTO: 314 K/UL — SIGNIFICANT CHANGE UP (ref 150–400)
POTASSIUM SERPL-MCNC: 4.8 MMOL/L — SIGNIFICANT CHANGE UP (ref 3.5–5.3)
POTASSIUM SERPL-SCNC: 4.8 MMOL/L — SIGNIFICANT CHANGE UP (ref 3.5–5.3)
RBC # BLD: 4.35 M/UL — SIGNIFICANT CHANGE UP (ref 3.8–5.2)
RBC # FLD: 14.2 % — SIGNIFICANT CHANGE UP (ref 10.3–14.5)
SODIUM SERPL-SCNC: 142 MMOL/L — SIGNIFICANT CHANGE UP (ref 135–145)
WBC # BLD: 7.25 K/UL — SIGNIFICANT CHANGE UP (ref 3.8–10.5)
WBC # FLD AUTO: 7.25 K/UL — SIGNIFICANT CHANGE UP (ref 3.8–10.5)

## 2018-09-04 PROCEDURE — 74183 MRI ABD W/O CNTR FLWD CNTR: CPT | Mod: 26

## 2018-09-04 RX ORDER — SODIUM CHLORIDE 9 MG/ML
1000 INJECTION, SOLUTION INTRAVENOUS
Qty: 0 | Refills: 0 | Status: DISCONTINUED | OUTPATIENT
Start: 2018-09-04 | End: 2018-09-07

## 2018-09-04 RX ADMIN — Medication 1 PATCH: at 11:12

## 2018-09-04 RX ADMIN — ENOXAPARIN SODIUM 40 MILLIGRAM(S): 100 INJECTION SUBCUTANEOUS at 11:12

## 2018-09-04 RX ADMIN — Medication 0.5 MILLIGRAM(S): at 20:17

## 2018-09-04 RX ADMIN — HYDROMORPHONE HYDROCHLORIDE 1 MILLIGRAM(S): 2 INJECTION INTRAMUSCULAR; INTRAVENOUS; SUBCUTANEOUS at 09:31

## 2018-09-04 RX ADMIN — SODIUM CHLORIDE 75 MILLILITER(S): 9 INJECTION, SOLUTION INTRAVENOUS at 11:12

## 2018-09-04 RX ADMIN — HYDROMORPHONE HYDROCHLORIDE 1 MILLIGRAM(S): 2 INJECTION INTRAMUSCULAR; INTRAVENOUS; SUBCUTANEOUS at 22:20

## 2018-09-04 RX ADMIN — HYDROMORPHONE HYDROCHLORIDE 1 MILLIGRAM(S): 2 INJECTION INTRAMUSCULAR; INTRAVENOUS; SUBCUTANEOUS at 10:01

## 2018-09-04 RX ADMIN — PANTOPRAZOLE SODIUM 40 MILLIGRAM(S): 20 TABLET, DELAYED RELEASE ORAL at 11:12

## 2018-09-04 RX ADMIN — HYDROMORPHONE HYDROCHLORIDE 1 MILLIGRAM(S): 2 INJECTION INTRAMUSCULAR; INTRAVENOUS; SUBCUTANEOUS at 21:48

## 2018-09-04 RX ADMIN — HYDROMORPHONE HYDROCHLORIDE 0.5 MILLIGRAM(S): 2 INJECTION INTRAMUSCULAR; INTRAVENOUS; SUBCUTANEOUS at 13:30

## 2018-09-04 RX ADMIN — HYDROMORPHONE HYDROCHLORIDE 0.5 MILLIGRAM(S): 2 INJECTION INTRAMUSCULAR; INTRAVENOUS; SUBCUTANEOUS at 14:00

## 2018-09-04 NOTE — PROGRESS NOTE ADULT - SUBJECTIVE AND OBJECTIVE BOX
SUBJECTIVE: Patient feels better. Still with pain. Awaiting MRCP.     MEDICATIONS  (STANDING):  dextrose 5% + sodium chloride 0.45%. 1000 milliLiter(s) (75 mL/Hr) IV Continuous <Continuous>  enoxaparin Injectable 40 milliGRAM(s) SubCutaneous every 24 hours  influenza   Vaccine 0.5 milliLiter(s) IntraMuscular once  nicotine - 21 mG/24Hr(s) Patch 1 patch Transdermal daily  pantoprazole  Injectable 40 milliGRAM(s) IV Push daily    MEDICATIONS  (PRN):  ALPRAZolam 0.5 milliGRAM(s) Oral two times a day PRN anxiety  HYDROmorphone  Injectable 0.5 milliGRAM(s) IV Push every 3 hours PRN Moderate Pain (4 - 6)  HYDROmorphone  Injectable 1 milliGRAM(s) IV Push every 3 hours PRN Severe Pain (7 - 10)  nicotine  Polacrilex Gum 4 milliGRAM(s) Oral every 6 hours PRN nicotine craving  nicotine  Polacrilex Lozenge 4 milliGRAM(s) Oral every 6 hours PRN nicotine craving  ondansetron Injectable 4 milliGRAM(s) IV Push every 6 hours PRN Nausea and/or Vomiting      OBJECTIVE:  Vital Signs Last 24 Hrs  T(C): 36.8 (04 Sep 2018 16:07), Max: 36.9 (04 Sep 2018 14:49)  T(F): 98.3 (04 Sep 2018 16:07), Max: 98.5 (04 Sep 2018 14:49)  HR: 68 (04 Sep 2018 16:07) (61 - 76)  BP: 130/93 (04 Sep 2018 16:07) (130/93 - 155/91)  BP(mean): --  RR: 18 (04 Sep 2018 16:07) (18 - 18)  SpO2: 98% (04 Sep 2018 16:07) (96% - 99%)    PHYSICAL EXAM:  Constitutional: alert and oriented, in no acute distress  HEENT: no scleral icterus, no palpable lymph nodes  Cardiovascular: normal heart sounds  Respiratory: clear lungs  Gastrointestinal: soft, mild epigastric tenderness, nondistended, normal bowel sounds,   Extremities: No peripheral edema b/l     LABS:                        14.5   7.25  )-----------( 314      ( 04 Sep 2018 09:01 )             41.6     09-04    142  |  102  |  <4<L>  ----------------------------<  103<H>  4.8   |  27  |  0.71    Ca    9.4      04 Sep 2018 07:50    TPro  6.4  /  Alb  3.7  /  TBili  0.4  /  DBili  x   /  AST  18  /  ALT  11  /  AlkPhos  50  09-03        LIVER FUNCTIONS - ( 03 Sep 2018 06:45 )  Alb: 3.7 g/dL / Pro: 6.4 g/dL / ALK PHOS: 50 U/L / ALT: 11 U/L / AST: 18 U/L / GGT: x             RADIOLOGY & ADDITIONAL TESTS:

## 2018-09-04 NOTE — PROGRESS NOTE ADULT - SUBJECTIVE AND OBJECTIVE BOX
Patient is a 53y old  Female who presents with a chief complaint of Abdominal pain and nausea (31 Aug 2018 21:36)      SUBJECTIVE / OVERNIGHT EVENTS:   Feels better.  Denies CP/SOB/Palpitation/HA.    MEDICATIONS  (STANDING):  dextrose 5% + sodium chloride 0.45%. 1000 milliLiter(s) (75 mL/Hr) IV Continuous <Continuous>  enoxaparin Injectable 40 milliGRAM(s) SubCutaneous every 24 hours  influenza   Vaccine 0.5 milliLiter(s) IntraMuscular once  nicotine - 21 mG/24Hr(s) Patch 1 patch Transdermal daily  pantoprazole  Injectable 40 milliGRAM(s) IV Push daily    MEDICATIONS  (PRN):  ALPRAZolam 0.5 milliGRAM(s) Oral two times a day PRN anxiety  HYDROmorphone  Injectable 0.5 milliGRAM(s) IV Push every 3 hours PRN Moderate Pain (4 - 6)  HYDROmorphone  Injectable 1 milliGRAM(s) IV Push every 3 hours PRN Severe Pain (7 - 10)  nicotine  Polacrilex Gum 4 milliGRAM(s) Oral every 6 hours PRN nicotine craving  nicotine  Polacrilex Lozenge 4 milliGRAM(s) Oral every 6 hours PRN nicotine craving  ondansetron Injectable 4 milliGRAM(s) IV Push every 6 hours PRN Nausea and/or Vomiting        CAPILLARY BLOOD GLUCOSE        I&O's Summary    03 Sep 2018 07:01  -  04 Sep 2018 07:00  --------------------------------------------------------  IN: 1225 mL / OUT: 1550 mL / NET: -325 mL    04 Sep 2018 07:01  -  04 Sep 2018 15:12  --------------------------------------------------------  IN: 260 mL / OUT: 0 mL / NET: 260 mL        PHYSICAL EXAM:  GENERAL: NAD, well-developed  HEAD:  Atraumatic, Normocephalic  NECK: Supple, No JVD  CHEST/LUNG: Clear to auscultation bilaterally; No wheezing.  HEART: Regular rate and rhythm; No murmurs, rubs, or gallops  ABDOMEN: Soft, Nontender, Nondistended; Bowel sounds present  EXTREMITIES:   No clubbing, cyanosis, or edema  NEUROLOGY: AAO X 3  SKIN: No rashes    LABS:                        14.5   7.25  )-----------( 314      ( 04 Sep 2018 09:01 )             41.6     09-04    142  |  102  |  <4<L>  ----------------------------<  103<H>  4.8   |  27  |  0.71    Ca    9.4      04 Sep 2018 07:50    TPro  6.4  /  Alb  3.7  /  TBili  0.4  /  DBili  x   /  AST  18  /  ALT  11  /  AlkPhos  50  09-03            CAPILLARY BLOOD GLUCOSE                    RADIOLOGY & ADDITIONAL TESTS:    Imaging Personally Reviewed:    Consultant(s) Notes Reviewed:      Care Discussed with Consultants/Other Providers:

## 2018-09-04 NOTE — PROGRESS NOTE ADULT - ASSESSMENT
· Assessment	  51F c hx SLE, chronic pancreatitis, ITP s/p splenectomy, current smoker, pw pancreatitis     Problem/Plan - 1:  ·  Problem: Other acute pancreatitis without infection or necrosis.  Plan: -   - acute on chronic pancreatitis likely etiology of abd pain  - GI eval noted. Regular diet as tolerated. MRCP.     Problem/Plan - 2:  ·  Problem: Epigastric pain.  Plan: - cont dilaudid PRN, which pt states helps a lot with the pain. transition to po during day.  -  protonix to treat poss gerd/pud  - likely 2/2 pancreatitis as above.        Problem/Plan - 4:  ·  Problem: Lupus erythematosus, unspecified form. Plan: - f/u with rheum as outpt.

## 2018-09-05 RX ADMIN — HYDROMORPHONE HYDROCHLORIDE 0.5 MILLIGRAM(S): 2 INJECTION INTRAMUSCULAR; INTRAVENOUS; SUBCUTANEOUS at 15:33

## 2018-09-05 RX ADMIN — HYDROMORPHONE HYDROCHLORIDE 0.5 MILLIGRAM(S): 2 INJECTION INTRAMUSCULAR; INTRAVENOUS; SUBCUTANEOUS at 11:06

## 2018-09-05 RX ADMIN — HYDROMORPHONE HYDROCHLORIDE 0.5 MILLIGRAM(S): 2 INJECTION INTRAMUSCULAR; INTRAVENOUS; SUBCUTANEOUS at 21:23

## 2018-09-05 RX ADMIN — HYDROMORPHONE HYDROCHLORIDE 0.5 MILLIGRAM(S): 2 INJECTION INTRAMUSCULAR; INTRAVENOUS; SUBCUTANEOUS at 15:03

## 2018-09-05 RX ADMIN — Medication 1 PATCH: at 10:38

## 2018-09-05 RX ADMIN — PANTOPRAZOLE SODIUM 40 MILLIGRAM(S): 20 TABLET, DELAYED RELEASE ORAL at 12:41

## 2018-09-05 RX ADMIN — HYDROMORPHONE HYDROCHLORIDE 0.5 MILLIGRAM(S): 2 INJECTION INTRAMUSCULAR; INTRAVENOUS; SUBCUTANEOUS at 21:53

## 2018-09-05 RX ADMIN — HYDROMORPHONE HYDROCHLORIDE 0.5 MILLIGRAM(S): 2 INJECTION INTRAMUSCULAR; INTRAVENOUS; SUBCUTANEOUS at 10:36

## 2018-09-05 RX ADMIN — ENOXAPARIN SODIUM 40 MILLIGRAM(S): 100 INJECTION SUBCUTANEOUS at 10:38

## 2018-09-05 NOTE — PROGRESS NOTE ADULT - SUBJECTIVE AND OBJECTIVE BOX
Patient is a 53y old  Female who presents with a chief complaint of Abdominal pain and nausea (04 Sep 2018 18:10)      SUBJECTIVE / OVERNIGHT EVENTS:   Feels better.  Denies CP/SOB/Palpitation/HA.    MEDICATIONS  (STANDING):  dextrose 5% + sodium chloride 0.45%. 1000 milliLiter(s) (75 mL/Hr) IV Continuous <Continuous>  enoxaparin Injectable 40 milliGRAM(s) SubCutaneous every 24 hours  influenza   Vaccine 0.5 milliLiter(s) IntraMuscular once  nicotine - 21 mG/24Hr(s) Patch 1 patch Transdermal daily  pantoprazole  Injectable 40 milliGRAM(s) IV Push daily    MEDICATIONS  (PRN):  ALPRAZolam 0.5 milliGRAM(s) Oral two times a day PRN anxiety  HYDROmorphone  Injectable 0.5 milliGRAM(s) IV Push every 3 hours PRN Moderate Pain (4 - 6)  HYDROmorphone  Injectable 1 milliGRAM(s) IV Push every 3 hours PRN Severe Pain (7 - 10)  nicotine  Polacrilex Gum 4 milliGRAM(s) Oral every 6 hours PRN nicotine craving  nicotine  Polacrilex Lozenge 4 milliGRAM(s) Oral every 6 hours PRN nicotine craving  ondansetron Injectable 4 milliGRAM(s) IV Push every 6 hours PRN Nausea and/or Vomiting        CAPILLARY BLOOD GLUCOSE        I&O's Summary    04 Sep 2018 07:01  -  05 Sep 2018 07:00  --------------------------------------------------------  IN: 2185 mL / OUT: 300 mL / NET: 1885 mL    05 Sep 2018 07:01  -  05 Sep 2018 16:51  --------------------------------------------------------  IN: 690 mL / OUT: 0 mL / NET: 690 mL        PHYSICAL EXAM:  GENERAL: NAD, well-developed  HEAD:  Atraumatic, Normocephalic  NECK: Supple, No JVD  CHEST/LUNG: Clear to auscultation bilaterally; No wheezing.  HEART: Regular rate and rhythm; No murmurs, rubs, or gallops  ABDOMEN: Soft, Nontender, Nondistended; Bowel sounds present  EXTREMITIES:   No clubbing, cyanosis, or edema  NEUROLOGY: AAO X 3  SKIN: No rashes    LABS:                        14.5   7.25  )-----------( 314      ( 04 Sep 2018 09:01 )             41.6     09-04    142  |  102  |  <4<L>  ----------------------------<  103<H>  4.8   |  27  |  0.71    Ca    9.4      04 Sep 2018 07:50              CAPILLARY BLOOD GLUCOSE                    RADIOLOGY & ADDITIONAL TESTS:    Imaging Personally Reviewed:    Consultant(s) Notes Reviewed:      Care Discussed with Consultants/Other Providers:

## 2018-09-05 NOTE — PROGRESS NOTE ADULT - ASSESSMENT
· Assessment	  51F c hx SLE, chronic pancreatitis, ITP s/p splenectomy, current smoker, pw pancreatitis     Problem/Plan - 1:  ·  Problem: Other acute pancreatitis without infection or necrosis.  Plan: -   - acute on chronic pancreatitis likely etiology of abd pain  - GI eval noted. Regular diet as tolerated. MRCP report pending.      Problem/Plan - 2:  ·  Problem: Epigastric pain.  Plan: - cont dilaudid PRN, which pt states helps a lot with the pain. transition to po during day.  -  protonix to treat poss gerd/pud  - likely 2/2 pancreatitis as above.        Problem/Plan - 4:  ·  Problem: Lupus erythematosus, unspecified form. Plan: - f/u with rheum as outpt.

## 2018-09-06 ENCOUNTER — TRANSCRIPTION ENCOUNTER (OUTPATIENT)
Age: 53
End: 2018-09-06

## 2018-09-06 LAB
ANION GAP SERPL CALC-SCNC: 12 MMOL/L — SIGNIFICANT CHANGE UP (ref 5–17)
BUN SERPL-MCNC: <4 MG/DL — LOW (ref 7–23)
CALCIUM SERPL-MCNC: 9.4 MG/DL — SIGNIFICANT CHANGE UP (ref 8.4–10.5)
CHLORIDE SERPL-SCNC: 101 MMOL/L — SIGNIFICANT CHANGE UP (ref 96–108)
CO2 SERPL-SCNC: 24 MMOL/L — SIGNIFICANT CHANGE UP (ref 22–31)
CREAT SERPL-MCNC: 0.56 MG/DL — SIGNIFICANT CHANGE UP (ref 0.5–1.3)
GLUCOSE SERPL-MCNC: 94 MG/DL — SIGNIFICANT CHANGE UP (ref 70–99)
HCT VFR BLD CALC: 40.1 % — SIGNIFICANT CHANGE UP (ref 34.5–45)
HGB BLD-MCNC: 13.4 G/DL — SIGNIFICANT CHANGE UP (ref 11.5–15.5)
MCHC RBC-ENTMCNC: 31.8 PG — SIGNIFICANT CHANGE UP (ref 27–34)
MCHC RBC-ENTMCNC: 33.4 GM/DL — SIGNIFICANT CHANGE UP (ref 32–36)
MCV RBC AUTO: 95 FL — SIGNIFICANT CHANGE UP (ref 80–100)
PLATELET # BLD AUTO: 323 K/UL — SIGNIFICANT CHANGE UP (ref 150–400)
POTASSIUM SERPL-MCNC: 3.2 MMOL/L — LOW (ref 3.5–5.3)
POTASSIUM SERPL-SCNC: 3.2 MMOL/L — LOW (ref 3.5–5.3)
RBC # BLD: 4.22 M/UL — SIGNIFICANT CHANGE UP (ref 3.8–5.2)
RBC # FLD: 14.4 % — SIGNIFICANT CHANGE UP (ref 10.3–14.5)
SODIUM SERPL-SCNC: 137 MMOL/L — SIGNIFICANT CHANGE UP (ref 135–145)
WBC # BLD: 9.2 K/UL — SIGNIFICANT CHANGE UP (ref 3.8–10.5)
WBC # FLD AUTO: 9.2 K/UL — SIGNIFICANT CHANGE UP (ref 3.8–10.5)

## 2018-09-06 RX ORDER — POTASSIUM CHLORIDE 20 MEQ
10 PACKET (EA) ORAL
Qty: 0 | Refills: 0 | Status: COMPLETED | OUTPATIENT
Start: 2018-09-06 | End: 2018-09-06

## 2018-09-06 RX ADMIN — HYDROMORPHONE HYDROCHLORIDE 0.5 MILLIGRAM(S): 2 INJECTION INTRAMUSCULAR; INTRAVENOUS; SUBCUTANEOUS at 09:26

## 2018-09-06 RX ADMIN — Medication 100 MILLIEQUIVALENT(S): at 20:01

## 2018-09-06 RX ADMIN — HYDROMORPHONE HYDROCHLORIDE 1 MILLIGRAM(S): 2 INJECTION INTRAMUSCULAR; INTRAVENOUS; SUBCUTANEOUS at 19:59

## 2018-09-06 RX ADMIN — HYDROMORPHONE HYDROCHLORIDE 1 MILLIGRAM(S): 2 INJECTION INTRAMUSCULAR; INTRAVENOUS; SUBCUTANEOUS at 15:55

## 2018-09-06 RX ADMIN — HYDROMORPHONE HYDROCHLORIDE 1 MILLIGRAM(S): 2 INJECTION INTRAMUSCULAR; INTRAVENOUS; SUBCUTANEOUS at 16:25

## 2018-09-06 RX ADMIN — HYDROMORPHONE HYDROCHLORIDE 1 MILLIGRAM(S): 2 INJECTION INTRAMUSCULAR; INTRAVENOUS; SUBCUTANEOUS at 20:29

## 2018-09-06 RX ADMIN — Medication 100 MILLIEQUIVALENT(S): at 22:14

## 2018-09-06 RX ADMIN — Medication 1 PATCH: at 12:38

## 2018-09-06 RX ADMIN — Medication 1 PATCH: at 12:45

## 2018-09-06 RX ADMIN — HYDROMORPHONE HYDROCHLORIDE 1 MILLIGRAM(S): 2 INJECTION INTRAMUSCULAR; INTRAVENOUS; SUBCUTANEOUS at 13:07

## 2018-09-06 RX ADMIN — HYDROMORPHONE HYDROCHLORIDE 1 MILLIGRAM(S): 2 INJECTION INTRAMUSCULAR; INTRAVENOUS; SUBCUTANEOUS at 12:37

## 2018-09-06 RX ADMIN — ONDANSETRON 4 MILLIGRAM(S): 8 TABLET, FILM COATED ORAL at 09:24

## 2018-09-06 RX ADMIN — ENOXAPARIN SODIUM 40 MILLIGRAM(S): 100 INJECTION SUBCUTANEOUS at 12:38

## 2018-09-06 RX ADMIN — Medication 100 MILLIEQUIVALENT(S): at 17:26

## 2018-09-06 RX ADMIN — Medication 0.5 MILLIGRAM(S): at 22:13

## 2018-09-06 RX ADMIN — HYDROMORPHONE HYDROCHLORIDE 0.5 MILLIGRAM(S): 2 INJECTION INTRAMUSCULAR; INTRAVENOUS; SUBCUTANEOUS at 09:56

## 2018-09-06 RX ADMIN — PANTOPRAZOLE SODIUM 40 MILLIGRAM(S): 20 TABLET, DELAYED RELEASE ORAL at 12:41

## 2018-09-06 NOTE — PROGRESS NOTE ADULT - ASSESSMENT
· Assessment	  51F c hx SLE, chronic pancreatitis, ITP s/p splenectomy, current smoker, pw pancreatitis     Problem/Plan - 1:  ·  Problem: Other acute pancreatitis without infection or necrosis.  Plan: -   - acute on chronic pancreatitis likely etiology of abd pain  - GI eval noted. Regular diet as tolerated. MRCP : GB sludge. For cholecystectomy tomorrow.    Considering pt's medical condition and nature of Sx, pt is at intermediate risk for surgery.

## 2018-09-06 NOTE — CONSULT NOTE ADULT - SUBJECTIVE AND OBJECTIVE BOX
[facility] TE[facility]GERY (#[id number]) CONSULT NOTE  ---------------------------------------------------------------------------------------------     HPI:   Patient is a 53y old  Female who presents with a chief complaint of Abdominal pain and nausea.  Patient has had abdominal pain and nausea since last Thursday night.  She has been admitted since last Friday.  She states that her pain is similar to previous episodes of pancreatitis that she has had over the past 2 years, but this episode last longer and is more severe. She has had emesis with any PO intake since then and has not kept anything down. Patients pancreatitis thought to be either autoimmune as she has SLE, or related to a pancreatic divisum.  Patient had an MRI yesterday to evaluate her pancreatitis, and it revealed gallbladder sludge.  patient states that she has had multiple ultrasounds and MRIs over the past 2 years since her pancreatitis started which all revealed no GB sludge or stones.      Patient's medical history significant for chronic pancreatitis, lupus.  Patient's surgical history significant for an open splenectomy for ITP about 20 years ago (L paramedian incision), Open hysterectomy about 10 years ago (low transverse incision), and a L inguinal and L paramedian incisional hernia repair both about 3 years ago, all done at outside hospitals.     ROS: 10-system review is otherwise negative except HPI above.      PAST MEDICAL & SURGICAL HISTORY:  Anxiety  Hernia  Gastritis  Lupus  Pancreatitis: 6 months ago  Lupus  Anxiety  ITP (idiopathic thrombocytopenic purpura)  Pancreas divisum  Pancreatitis  S/P hernia repair: Ventral ( 1/5/2015 )  H/O bilateral breast implants  S/P hysterectomy: 8 years ago- endometriosis  S/P splenectomy: about 20 years ago- ITP  History of hysterectomy: secondary to endometriosis  History of splenectomy    FAMILY HISTORY:  Family history of colon cancer  Family history of breast cancer (Grandparent)  Family history of colon cancer  Family history of liver disease: liver cirrhosis    SOCIAL HISTORY:  Current smoker    ALLERGIES: No Known Allergies    CURRENT MEDICATIONS  MEDICATIONS (STANDING): dextrose 5% + sodium chloride 0.45%. 1000 milliLiter(s) IV Continuous <Continuous>  enoxaparin Injectable 40 milliGRAM(s) SubCutaneous every 24 hours  influenza   Vaccine 0.5 milliLiter(s) IntraMuscular once  nicotine - 21 mG/24Hr(s) Patch 1 patch Transdermal daily  pantoprazole  Injectable 40 milliGRAM(s) IV Push daily  potassium chloride  10 mEq/100 mL IVPB 10 milliEquivalent(s) IV Intermittent every 1 hour    MEDICATIONS (PRN):ALPRAZolam 0.5 milliGRAM(s) Oral two times a day PRN anxiety  HYDROmorphone  Injectable 0.5 milliGRAM(s) IV Push every 3 hours PRN Moderate Pain (4 - 6)  HYDROmorphone  Injectable 1 milliGRAM(s) IV Push every 3 hours PRN Severe Pain (7 - 10)  nicotine  Polacrilex Gum 4 milliGRAM(s) Oral every 6 hours PRN nicotine craving  nicotine  Polacrilex Lozenge 4 milliGRAM(s) Oral every 6 hours PRN nicotine craving  ondansetron Injectable 4 milliGRAM(s) IV Push every 6 hours PRN Nausea and/or Vomiting    --------------------------------------------------------------------------------------------    Vitals:   T(C): 36.6 (09-06-18 @ 17:17), Max: 36.8 (09-06-18 @ 01:20)  HR: 70 (09-06-18 @ 17:17) (68 - 89)  BP: 127/95 (09-06-18 @ 13:34) (120/81 - 153/96)  RR: 18 (09-06-18 @ 17:17) (18 - 18)  SpO2: 99% (09-06-18 @ 17:17) (94% - 99%)      09-05 @ 07:01  -  09-06 @ 07:00  --------------------------------------------------------  IN:    dextrose 5% + sodium chloride 0.45%.: 1800 mL    Oral Fluid: 540 mL  Total IN: 2340 mL    OUT:  Total OUT: 0 mL    Total NET: 2340 mL    Height (cm): 167.64 (08-31 @ 15:30)  Weight (kg): 62.6 (08-31 @ 15:30)  BMI (kg/m2): 22.3 (08-31 @ 15:30)  BSA (m2): 1.71 (08-31 @ 15:30)    PHYSICAL EXAM:   General: NAD, Lying in bed comfortably  Neuro: A+Ox3  HEENT: NC/AT, EOMI  Cardio: RRR, nml S1/S2  Resp: Good effort b/l  GI/Abd: Soft, mildly distended, epigastric tenderness, no masses palpated, negative Morales's sign, well-healed L paramedian and low transverse incisions  Vascular: All 4 extremities warm.  Musculoskeletal: All 4 extremities moving spontaneously, no limitations  --------------------------------------------------------------------------------------------    LABS  CBC (09-06 @ 09:17)                              13.4                           9.20    )----------------(  323        --    % Neutrophils, --    % Lymphocytes, ANC: --                                  40.1      BMP (09-06 @ 07:09)             137     |  101     |  <4<L> 		Ca++ --      Ca 9.4                ---------------------------------( 94    		Mg --                 3.2<L>  |  24      |  0.56  			Ph --      --------------------------------------------------------------------------------------------    IMAGING  < from: US Abdomen Complete (09.01.18 @ 11:30) >  IMPRESSION:   Normal abdominal ultrasound. No cholelithiasis or choledocholithiasis.  < end of copied text >    < from: MR Abdomen w/wo IV Cont (09.04.18 @ 23:14) >  GALLBLADDER: Biliary sludge.  IMPRESSION:  Pancreas divisum.  < end of copied text >      --------------------------------------------------------------------------------------------

## 2018-09-06 NOTE — CONSULT NOTE ADULT - ASSESSMENT
ASSESSMENT: Patient is a 53y old f with chronic pancreatitis, with gallbladder sludge on MRI    PLAN:   - Will tentatively plan for OR tomorrow for laparoscopic cholecytectomy with Dr. MARIAM Talamantes  - Added on to OR schedule for tomorrow  - Consent obtained  - Preop labs ordered for am  - NPO at midnight  - Cont. IVF  - Patient and plan discussed with Attending, Dr. Talamantes.     Shahriar Love MD PGY4  [facility] Team Surgery, #[id number]

## 2018-09-06 NOTE — PROGRESS NOTE ADULT - SUBJECTIVE AND OBJECTIVE BOX
Patient is a 53y old  Female who presents with a chief complaint of Abdominal pain and nausea (06 Sep 2018 19:09)      SUBJECTIVE / OVERNIGHT EVENTS:   Feels better.  Denies CP/SOB/Palpitation/HA.    MEDICATIONS  (STANDING):  dextrose 5% + sodium chloride 0.45%. 1000 milliLiter(s) (75 mL/Hr) IV Continuous <Continuous>  enoxaparin Injectable 40 milliGRAM(s) SubCutaneous every 24 hours  influenza   Vaccine 0.5 milliLiter(s) IntraMuscular once  nicotine - 21 mG/24Hr(s) Patch 1 patch Transdermal daily  pantoprazole  Injectable 40 milliGRAM(s) IV Push daily    MEDICATIONS  (PRN):  ALPRAZolam 0.5 milliGRAM(s) Oral two times a day PRN anxiety  HYDROmorphone  Injectable 0.5 milliGRAM(s) IV Push every 3 hours PRN Moderate Pain (4 - 6)  HYDROmorphone  Injectable 1 milliGRAM(s) IV Push every 3 hours PRN Severe Pain (7 - 10)  nicotine  Polacrilex Gum 4 milliGRAM(s) Oral every 6 hours PRN nicotine craving  nicotine  Polacrilex Lozenge 4 milliGRAM(s) Oral every 6 hours PRN nicotine craving  ondansetron Injectable 4 milliGRAM(s) IV Push every 6 hours PRN Nausea and/or Vomiting        CAPILLARY BLOOD GLUCOSE        I&O's Summary    05 Sep 2018 07:01  -  06 Sep 2018 07:00  --------------------------------------------------------  IN: 2340 mL / OUT: 0 mL / NET: 2340 mL    06 Sep 2018 07:01  -  07 Sep 2018 00:14  --------------------------------------------------------  IN: 1220 mL / OUT: 0 mL / NET: 1220 mL        PHYSICAL EXAM:  GENERAL: NAD, well-developed  HEAD:  Atraumatic, Normocephalic  NECK: Supple, No JVD  CHEST/LUNG: Clear to auscultation bilaterally; No wheezing.  HEART: Regular rate and rhythm; No murmurs, rubs, or gallops  ABDOMEN: Soft, Nontender, Nondistended; Bowel sounds present  EXTREMITIES:   No clubbing, cyanosis, or edema  NEUROLOGY: AAO X 3  SKIN: No rashes    LABS:                        13.4   9.20  )-----------( 323      ( 06 Sep 2018 09:17 )             40.1     09-06    137  |  101  |  <4<L>  ----------------------------<  94  3.2<L>   |  24  |  0.56    Ca    9.4      06 Sep 2018 07:09              CAPILLARY BLOOD GLUCOSE                    RADIOLOGY & ADDITIONAL TESTS:    Imaging Personally Reviewed:    Consultant(s) Notes Reviewed:      Care Discussed with Consultants/Other Providers:

## 2018-09-07 ENCOUNTER — RESULT REVIEW (OUTPATIENT)
Age: 53
End: 2018-09-07

## 2018-09-07 LAB
ALBUMIN SERPL ELPH-MCNC: 3.6 G/DL — SIGNIFICANT CHANGE UP (ref 3.3–5)
ALP SERPL-CCNC: 45 U/L — SIGNIFICANT CHANGE UP (ref 40–120)
ALT FLD-CCNC: 28 U/L — SIGNIFICANT CHANGE UP (ref 10–45)
ANION GAP SERPL CALC-SCNC: 12 MMOL/L — SIGNIFICANT CHANGE UP (ref 5–17)
APTT BLD: 29.1 SEC — SIGNIFICANT CHANGE UP (ref 27.5–37.4)
AST SERPL-CCNC: 33 U/L — SIGNIFICANT CHANGE UP (ref 10–40)
BILIRUB DIRECT SERPL-MCNC: 0.1 MG/DL — SIGNIFICANT CHANGE UP (ref 0–0.2)
BILIRUB INDIRECT FLD-MCNC: 0.3 MG/DL — SIGNIFICANT CHANGE UP (ref 0.2–1)
BILIRUB SERPL-MCNC: 0.4 MG/DL — SIGNIFICANT CHANGE UP (ref 0.2–1.2)
BLD GP AB SCN SERPL QL: NEGATIVE — SIGNIFICANT CHANGE UP
BUN SERPL-MCNC: <4 MG/DL — LOW (ref 7–23)
CALCIUM SERPL-MCNC: 9.4 MG/DL — SIGNIFICANT CHANGE UP (ref 8.4–10.5)
CHLORIDE SERPL-SCNC: 105 MMOL/L — SIGNIFICANT CHANGE UP (ref 96–108)
CO2 SERPL-SCNC: 25 MMOL/L — SIGNIFICANT CHANGE UP (ref 22–31)
CREAT SERPL-MCNC: 0.72 MG/DL — SIGNIFICANT CHANGE UP (ref 0.5–1.3)
GLUCOSE SERPL-MCNC: 94 MG/DL — SIGNIFICANT CHANGE UP (ref 70–99)
HCT VFR BLD CALC: 40.5 % — SIGNIFICANT CHANGE UP (ref 34.5–45)
HGB BLD-MCNC: 13.8 G/DL — SIGNIFICANT CHANGE UP (ref 11.5–15.5)
INR BLD: 1.1 RATIO — SIGNIFICANT CHANGE UP (ref 0.88–1.16)
MCHC RBC-ENTMCNC: 33.3 PG — SIGNIFICANT CHANGE UP (ref 27–34)
MCHC RBC-ENTMCNC: 34.1 GM/DL — SIGNIFICANT CHANGE UP (ref 32–36)
MCV RBC AUTO: 97.6 FL — SIGNIFICANT CHANGE UP (ref 80–100)
PLATELET # BLD AUTO: 304 K/UL — SIGNIFICANT CHANGE UP (ref 150–400)
POTASSIUM SERPL-MCNC: 4.1 MMOL/L — SIGNIFICANT CHANGE UP (ref 3.5–5.3)
POTASSIUM SERPL-SCNC: 4.1 MMOL/L — SIGNIFICANT CHANGE UP (ref 3.5–5.3)
PROT SERPL-MCNC: 6 G/DL — SIGNIFICANT CHANGE UP (ref 6–8.3)
PROTHROM AB SERPL-ACNC: 12.4 SEC — SIGNIFICANT CHANGE UP (ref 10–13.1)
RBC # BLD: 4.15 M/UL — SIGNIFICANT CHANGE UP (ref 3.8–5.2)
RBC # FLD: 14.4 % — SIGNIFICANT CHANGE UP (ref 10.3–14.5)
RH IG SCN BLD-IMP: POSITIVE — SIGNIFICANT CHANGE UP
SODIUM SERPL-SCNC: 142 MMOL/L — SIGNIFICANT CHANGE UP (ref 135–145)
WBC # BLD: 7.19 K/UL — SIGNIFICANT CHANGE UP (ref 3.8–10.5)
WBC # FLD AUTO: 7.19 K/UL — SIGNIFICANT CHANGE UP (ref 3.8–10.5)

## 2018-09-07 PROCEDURE — 88304 TISSUE EXAM BY PATHOLOGIST: CPT | Mod: 26

## 2018-09-07 RX ORDER — ONDANSETRON 8 MG/1
4 TABLET, FILM COATED ORAL EVERY 6 HOURS
Qty: 0 | Refills: 0 | Status: DISCONTINUED | OUTPATIENT
Start: 2018-09-07 | End: 2018-09-18

## 2018-09-07 RX ORDER — NICOTINE POLACRILEX 2 MG
4 GUM BUCCAL EVERY 6 HOURS
Qty: 0 | Refills: 0 | Status: DISCONTINUED | OUTPATIENT
Start: 2018-09-07 | End: 2018-09-18

## 2018-09-07 RX ORDER — SODIUM CHLORIDE 9 MG/ML
1000 INJECTION, SOLUTION INTRAVENOUS
Qty: 0 | Refills: 0 | Status: DISCONTINUED | OUTPATIENT
Start: 2018-09-07 | End: 2018-09-07

## 2018-09-07 RX ORDER — ENOXAPARIN SODIUM 100 MG/ML
40 INJECTION SUBCUTANEOUS EVERY 24 HOURS
Qty: 0 | Refills: 0 | Status: DISCONTINUED | OUTPATIENT
Start: 2018-09-07 | End: 2018-09-18

## 2018-09-07 RX ORDER — OXYCODONE AND ACETAMINOPHEN 5; 325 MG/1; MG/1
1 TABLET ORAL EVERY 4 HOURS
Qty: 0 | Refills: 0 | Status: DISCONTINUED | OUTPATIENT
Start: 2018-09-07 | End: 2018-09-07

## 2018-09-07 RX ORDER — NICOTINE POLACRILEX 2 MG
1 GUM BUCCAL DAILY
Qty: 0 | Refills: 0 | Status: DISCONTINUED | OUTPATIENT
Start: 2018-09-07 | End: 2018-09-18

## 2018-09-07 RX ORDER — HYDROMORPHONE HYDROCHLORIDE 2 MG/ML
0.5 INJECTION INTRAMUSCULAR; INTRAVENOUS; SUBCUTANEOUS
Qty: 0 | Refills: 0 | Status: DISCONTINUED | OUTPATIENT
Start: 2018-09-07 | End: 2018-09-07

## 2018-09-07 RX ORDER — ONDANSETRON 8 MG/1
4 TABLET, FILM COATED ORAL ONCE
Qty: 0 | Refills: 0 | Status: DISCONTINUED | OUTPATIENT
Start: 2018-09-07 | End: 2018-09-07

## 2018-09-07 RX ORDER — CHLORHEXIDINE GLUCONATE 213 G/1000ML
1 SOLUTION TOPICAL ONCE
Qty: 0 | Refills: 0 | Status: COMPLETED | OUTPATIENT
Start: 2018-09-07 | End: 2018-09-07

## 2018-09-07 RX ORDER — MORPHINE SULFATE 50 MG/1
2 CAPSULE, EXTENDED RELEASE ORAL EVERY 6 HOURS
Qty: 0 | Refills: 0 | Status: DISCONTINUED | OUTPATIENT
Start: 2018-09-07 | End: 2018-09-07

## 2018-09-07 RX ORDER — HYDROMORPHONE HYDROCHLORIDE 2 MG/ML
0.5 INJECTION INTRAMUSCULAR; INTRAVENOUS; SUBCUTANEOUS EVERY 4 HOURS
Qty: 0 | Refills: 0 | Status: DISCONTINUED | OUTPATIENT
Start: 2018-09-07 | End: 2018-09-12

## 2018-09-07 RX ORDER — CHLORHEXIDINE GLUCONATE 213 G/1000ML
1 SOLUTION TOPICAL DAILY
Qty: 0 | Refills: 0 | Status: DISCONTINUED | OUTPATIENT
Start: 2018-09-07 | End: 2018-09-07

## 2018-09-07 RX ORDER — SODIUM CHLORIDE 9 MG/ML
1000 INJECTION, SOLUTION INTRAVENOUS
Qty: 0 | Refills: 0 | Status: DISCONTINUED | OUTPATIENT
Start: 2018-09-07 | End: 2018-09-08

## 2018-09-07 RX ORDER — ACETAMINOPHEN 500 MG
1000 TABLET ORAL ONCE
Qty: 0 | Refills: 0 | Status: COMPLETED | OUTPATIENT
Start: 2018-09-07 | End: 2018-09-07

## 2018-09-07 RX ADMIN — HYDROMORPHONE HYDROCHLORIDE 0.5 MILLIGRAM(S): 2 INJECTION INTRAMUSCULAR; INTRAVENOUS; SUBCUTANEOUS at 19:25

## 2018-09-07 RX ADMIN — HYDROMORPHONE HYDROCHLORIDE 0.5 MILLIGRAM(S): 2 INJECTION INTRAMUSCULAR; INTRAVENOUS; SUBCUTANEOUS at 23:10

## 2018-09-07 RX ADMIN — CHLORHEXIDINE GLUCONATE 1 APPLICATION(S): 213 SOLUTION TOPICAL at 10:38

## 2018-09-07 RX ADMIN — HYDROMORPHONE HYDROCHLORIDE 0.5 MILLIGRAM(S): 2 INJECTION INTRAMUSCULAR; INTRAVENOUS; SUBCUTANEOUS at 17:15

## 2018-09-07 RX ADMIN — MORPHINE SULFATE 2 MILLIGRAM(S): 50 CAPSULE, EXTENDED RELEASE ORAL at 20:40

## 2018-09-07 RX ADMIN — ONDANSETRON 4 MILLIGRAM(S): 8 TABLET, FILM COATED ORAL at 17:47

## 2018-09-07 RX ADMIN — MORPHINE SULFATE 2 MILLIGRAM(S): 50 CAPSULE, EXTENDED RELEASE ORAL at 21:10

## 2018-09-07 RX ADMIN — SODIUM CHLORIDE 125 MILLILITER(S): 9 INJECTION, SOLUTION INTRAVENOUS at 09:04

## 2018-09-07 RX ADMIN — Medication 0.5 MILLIGRAM(S): at 18:08

## 2018-09-07 RX ADMIN — Medication 1000 MILLIGRAM(S): at 23:00

## 2018-09-07 RX ADMIN — ONDANSETRON 4 MILLIGRAM(S): 8 TABLET, FILM COATED ORAL at 22:37

## 2018-09-07 RX ADMIN — HYDROMORPHONE HYDROCHLORIDE 0.5 MILLIGRAM(S): 2 INJECTION INTRAMUSCULAR; INTRAVENOUS; SUBCUTANEOUS at 18:22

## 2018-09-07 RX ADMIN — ENOXAPARIN SODIUM 40 MILLIGRAM(S): 100 INJECTION SUBCUTANEOUS at 21:31

## 2018-09-07 RX ADMIN — Medication 1 PATCH: at 12:59

## 2018-09-07 RX ADMIN — HYDROMORPHONE HYDROCHLORIDE 0.5 MILLIGRAM(S): 2 INJECTION INTRAMUSCULAR; INTRAVENOUS; SUBCUTANEOUS at 23:40

## 2018-09-07 RX ADMIN — HYDROMORPHONE HYDROCHLORIDE 0.5 MILLIGRAM(S): 2 INJECTION INTRAMUSCULAR; INTRAVENOUS; SUBCUTANEOUS at 17:44

## 2018-09-07 RX ADMIN — Medication 400 MILLIGRAM(S): at 22:30

## 2018-09-07 NOTE — PROGRESS NOTE ADULT - SUBJECTIVE AND OBJECTIVE BOX
Patient is a 53y old  Female who presents with a chief complaint of Abdominal pain and nausea (07 Sep 2018 08:51)      SUBJECTIVE / OVERNIGHT EVENTS:  s/p Lap Mercedes.  Denies CP/SOB/Palpitation/HA.    MEDICATIONS  (STANDING):  enoxaparin Injectable 40 milliGRAM(s) SubCutaneous every 24 hours  influenza   Vaccine 0.5 milliLiter(s) IntraMuscular once  lactated ringers. 1000 milliLiter(s) (100 mL/Hr) IV Continuous <Continuous>  nicotine - 21 mG/24Hr(s) Patch 1 patch Transdermal daily    MEDICATIONS  (PRN):  HYDROmorphone  Injectable 0.5 milliGRAM(s) IV Push every 10 minutes PRN Moderate Pain (4 - 6)  nicotine  Polacrilex Gum 4 milliGRAM(s) Oral every 6 hours PRN nicotine craving  nicotine  Polacrilex Lozenge 4 milliGRAM(s) Oral every 6 hours PRN nicotine craving  ondansetron Injectable 4 milliGRAM(s) IV Push every 6 hours PRN Nausea and/or Vomiting  ondansetron Injectable 4 milliGRAM(s) IV Push once PRN Nausea and/or Vomiting  oxyCODONE    5 mG/acetaminophen 325 mG 1 Tablet(s) Oral every 4 hours PRN Moderate Pain (4 - 6)        CAPILLARY BLOOD GLUCOSE        I&O's Summary    06 Sep 2018 07:01  -  07 Sep 2018 07:00  --------------------------------------------------------  IN: 1380 mL / OUT: 0 mL / NET: 1380 mL    07 Sep 2018 07:01  -  07 Sep 2018 19:45  --------------------------------------------------------  IN: 200 mL / OUT: 600 mL / NET: -400 mL        PHYSICAL EXAM:  GENERAL: NAD, well-developed  HEAD:  Atraumatic, Normocephalic  NECK: Supple, No JVD  CHEST/LUNG: Clear to auscultation bilaterally; No wheezing.  HEART: Regular rate and rhythm; No murmurs, rubs, or gallops  ABDOMEN: Soft, Nontender, Nondistended; Bowel sounds present  EXTREMITIES:   No clubbing, cyanosis, or edema  NEUROLOGY: AAO X 3  SKIN: No rashes    LABS:                        13.8   7.19  )-----------( 304      ( 07 Sep 2018 09:05 )             40.5     09-07    142  |  105  |  <4<L>  ----------------------------<  94  4.1   |  25  |  0.72    Ca    9.4      07 Sep 2018 07:28    TPro  6.0  /  Alb  3.6  /  TBili  0.4  /  DBili  0.1  /  AST  33  /  ALT  28  /  AlkPhos  45  09-07    PT/INR - ( 07 Sep 2018 09:09 )   PT: 12.4 sec;   INR: 1.10 ratio         PTT - ( 07 Sep 2018 09:09 )  PTT:29.1 sec        CAPILLARY BLOOD GLUCOSE                    RADIOLOGY & ADDITIONAL TESTS:    Imaging Personally Reviewed:    Consultant(s) Notes Reviewed:      Care Discussed with Consultants/Other Providers:

## 2018-09-07 NOTE — PROGRESS NOTE ADULT - ASSESSMENT
· Assessment	  51F c hx SLE, chronic pancreatitis, ITP s/p splenectomy, current smoker, pw pancreatitis     Problem/Plan - 1:  ·  Problem:  Gall Bladder Surge:         S/p Lap Mercedes.  Sx/GI f/up  Pain control

## 2018-09-07 NOTE — BRIEF OPERATIVE NOTE - PROCEDURE
<<-----Click on this checkbox to enter Procedure Laparoscopic cholecystectomy  09/07/2018    Active  [id number]

## 2018-09-07 NOTE — PROGRESS NOTE ADULT - ASSESSMENT
A/P:  53y old f with chronic pancreatitis, with gallbladder sludge on MRI  -Pain control  -Nausea control  -DVT ppx  -OOB ambulation/IS use  -Pancreatitis: F/u GI recs for potential d/c tomorrow  Red Sx [phone]

## 2018-09-07 NOTE — PROGRESS NOTE ADULT - SUBJECTIVE AND OBJECTIVE BOX
Red Surgery Progress Note    54 yo F s/p Lap Mercedes. GOLD since OR. Pain controlled w IV meds. No n/v. Denies CP/SOB    Vital Signs Last 24 Hrs  T(C): 36.8 (07 Sep 2018 21:00), Max: 36.9 (07 Sep 2018 20:03)  T(F): 98.3 (07 Sep 2018 21:00), Max: 98.4 (07 Sep 2018 20:03)  HR: 70 (07 Sep 2018 21:00) (59 - 72)  BP: 148/89 (07 Sep 2018 21:00) (105/74 - 189/99)  BP(mean): 118 (07 Sep 2018 19:45) (110 - 136)  RR: 16 (07 Sep 2018 21:00) (16 - 18)  SpO2: 97% (07 Sep 2018 21:00) (95% - 100%)        I&O's Detail    06 Sep 2018 07:01  -  07 Sep 2018 07:00  --------------------------------------------------------  IN:    dextrose 5% + sodium chloride 0.45%.: 900 mL    Oral Fluid: 180 mL    Solution: 200 mL    Solution: 100 mL  Total IN: 1380 mL    OUT:  Total OUT: 0 mL    Total NET: 1380 mL      07 Sep 2018 07:01  -  07 Sep 2018 21:18  --------------------------------------------------------  IN:    lactated ringers.: 200 mL  Total IN: 200 mL    OUT:    Voided: 850 mL  Total OUT: 850 mL    Total NET: -650 mL      PE: Gen: NAD  CV: RRR  Pulm: non-labored  Abd: soft, incisional tenderness, not distended.   Ext: WWP

## 2018-09-07 NOTE — BRIEF OPERATIVE NOTE - PRE-OP DX
Pancreas divisum  09/07/2018    Active  Lidia Dumont  Pancreatitis  09/07/2018    Active  Lidia Dumont

## 2018-09-07 NOTE — BRIEF OPERATIVE NOTE - OPERATION/FINDINGS
Laparoscopic cholecystectomy, cystic artery and duct taken with hemolock clips.  Significant adhesions in lower abdomen noted due to prior surgeries.

## 2018-09-07 NOTE — PROGRESS NOTE ADULT - ASSESSMENT
A/P: GB sludge. RAP. Pancreas divisum. Patient understands that lap zuleima may not prevent further attacks of pancreatitis but may be helpful especially if element of chronic cholecystitis or if sludge is contributing to recurrent acute pancreatitis. However it is safer than minor papilla ERCP especially with non dilated pancreatic duct. If recurrent attacks continue after lap zuleima then minor papilla ERCP may be justified in the future. She is agreeable to this plan.

## 2018-09-07 NOTE — PROGRESS NOTE ADULT - ASSESSMENT
ASSESSMENT: Patient is a 53y old f with chronic pancreatitis, with gallbladder sludge on MRI    PLAN:   - Patient going to OR today for laparoscopic cholecytectomy with Dr. MARIAM Talamantes  - Added on to OR schedule  - Consent obtained  - Preop labs ordered for this AM  - NPO since midnight  - Cont. BERNARDO Loco, PGY-2  Red Surgery [phone]

## 2018-09-07 NOTE — PROGRESS NOTE ADULT - SUBJECTIVE AND OBJECTIVE BOX
Surgery Progress Note    S: Patient seen and examined. No acute events overnight.     O:  Vital Signs Last 24 Hrs  T(C): 36.8 (07 Sep 2018 06:00), Max: 36.8 (07 Sep 2018 06:00)  T(F): 98.2 (07 Sep 2018 06:00), Max: 98.2 (07 Sep 2018 06:00)  HR: 65 (07 Sep 2018 06:00) (62 - 89)  BP: 105/74 (07 Sep 2018 06:00) (105/74 - 161/74)  BP(mean): --  RR: 18 (07 Sep 2018 06:00) (16 - 18)  SpO2: 97% (07 Sep 2018 06:00) (95% - 99%)    I&O's Detail    06 Sep 2018 07:01  -  07 Sep 2018 07:00  --------------------------------------------------------  IN:    dextrose 5% + sodium chloride 0.45%.: 900 mL    Oral Fluid: 180 mL    Solution: 200 mL    Solution: 100 mL  Total IN: 1380 mL    OUT:  Total OUT: 0 mL    Total NET: 1380 mL          MEDICATIONS  (STANDING):  chlorhexidine 2% Cloths 1 Application(s) Topical daily  dextrose 5% + sodium chloride 0.45%. 1000 milliLiter(s) (75 mL/Hr) IV Continuous <Continuous>  enoxaparin Injectable 40 milliGRAM(s) SubCutaneous every 24 hours  influenza   Vaccine 0.5 milliLiter(s) IntraMuscular once  lactated ringers. 1000 milliLiter(s) (125 mL/Hr) IV Continuous <Continuous>  nicotine - 21 mG/24Hr(s) Patch 1 patch Transdermal daily  pantoprazole  Injectable 40 milliGRAM(s) IV Push daily    MEDICATIONS  (PRN):  ALPRAZolam 0.5 milliGRAM(s) Oral two times a day PRN anxiety  HYDROmorphone  Injectable 0.5 milliGRAM(s) IV Push every 3 hours PRN Moderate Pain (4 - 6)  HYDROmorphone  Injectable 1 milliGRAM(s) IV Push every 3 hours PRN Severe Pain (7 - 10)  nicotine  Polacrilex Gum 4 milliGRAM(s) Oral every 6 hours PRN nicotine craving  nicotine  Polacrilex Lozenge 4 milliGRAM(s) Oral every 6 hours PRN nicotine craving  ondansetron Injectable 4 milliGRAM(s) IV Push every 6 hours PRN Nausea and/or Vomiting                            13.4   9.20  )-----------( 323      ( 06 Sep 2018 09:17 )             40.1       09-06    137  |  101  |  <4<L>  ----------------------------<  94  3.2<L>   |  24  |  0.56    Ca    9.4      06 Sep 2018 07:09    TPro  x   /  Alb  x   /  TBili  x   /  DBili  0.1  /  AST  x   /  ALT  x   /  AlkPhos  x   09-07      Physical Exam:  Gen: Laying in bed, NAD  Resp: Unlabored breathing  Abd: soft, NTND  Ext: WWP  Skin: No rashes

## 2018-09-07 NOTE — PROGRESS NOTE ADULT - SUBJECTIVE AND OBJECTIVE BOX
SUBJECTIVE: Patient seen in the morning. Was awaiting Lap Mercedes. Mild RUQ pain.     MEDICATIONS  (STANDING):  enoxaparin Injectable 40 milliGRAM(s) SubCutaneous every 24 hours  influenza   Vaccine 0.5 milliLiter(s) IntraMuscular once  lactated ringers. 1000 milliLiter(s) (100 mL/Hr) IV Continuous <Continuous>  nicotine - 21 mG/24Hr(s) Patch 1 patch Transdermal daily    MEDICATIONS  (PRN):  HYDROmorphone  Injectable 0.5 milliGRAM(s) IV Push every 4 hours PRN Severe Pain (7 - 10)  nicotine  Polacrilex Gum 4 milliGRAM(s) Oral every 6 hours PRN nicotine craving  nicotine  Polacrilex Lozenge 4 milliGRAM(s) Oral every 6 hours PRN nicotine craving  ondansetron Injectable 4 milliGRAM(s) IV Push every 6 hours PRN Nausea and/or Vomiting  oxyCODONE    5 mG/acetaminophen 325 mG 1 Tablet(s) Oral every 4 hours PRN Moderate Pain (4 - 6)      OBJECTIVE:  Vital Signs Last 24 Hrs  T(C): 36.9 (07 Sep 2018 22:00), Max: 36.9 (07 Sep 2018 20:03)  T(F): 98.4 (07 Sep 2018 22:00), Max: 98.4 (07 Sep 2018 20:03)  HR: 76 (07 Sep 2018 22:00) (59 - 76)  BP: 150/90 (07 Sep 2018 22:00) (105/74 - 189/99)  BP(mean): 118 (07 Sep 2018 19:45) (110 - 136)  RR: 16 (07 Sep 2018 22:00) (16 - 18)  SpO2: 97% (07 Sep 2018 22:00) (95% - 100%)    PHYSICAL EXAM:  Constitutional: alert and oriented, in no acute distress  HEENT: no scleral icterus, no palpable lymph nodes  Cardiovascular: normal heart sounds  Respiratory: clear lungs  Gastrointestinal: soft, mild epigastric tenderness, nondistended, normal bowel sounds,   Extremities: No peripheral edema b/l     LABS:                        13.8   7.19  )-----------( 304      ( 07 Sep 2018 09:05 )             40.5     09-07    142  |  105  |  <4<L>  ----------------------------<  94  4.1   |  25  |  0.72    Ca    9.4      07 Sep 2018 07:28    TPro  6.0  /  Alb  3.6  /  TBili  0.4  /  DBili  0.1  /  AST  33  /  ALT  28  /  AlkPhos  45  09-07    PT/INR - ( 07 Sep 2018 09:09 )   PT: 12.4 sec;   INR: 1.10 ratio         PTT - ( 07 Sep 2018 09:09 )  PTT:29.1 sec    LIVER FUNCTIONS - ( 07 Sep 2018 07:28 )  Alb: 3.6 g/dL / Pro: 6.0 g/dL / ALK PHOS: 45 U/L / ALT: 28 U/L / AST: 33 U/L / GGT: x             RADIOLOGY & ADDITIONAL TESTS:  MRI/MRCP discussed in detail with her yesterday  and today. GB sludge. Pancreas divisum. No dilated PD.

## 2018-09-08 ENCOUNTER — TRANSCRIPTION ENCOUNTER (OUTPATIENT)
Age: 53
End: 2018-09-08

## 2018-09-08 LAB
ALBUMIN SERPL ELPH-MCNC: 4 G/DL — SIGNIFICANT CHANGE UP (ref 3.3–5)
ALP SERPL-CCNC: 59 U/L — SIGNIFICANT CHANGE UP (ref 40–120)
ALT FLD-CCNC: 48 U/L — HIGH (ref 10–45)
ANION GAP SERPL CALC-SCNC: 16 MMOL/L — SIGNIFICANT CHANGE UP (ref 5–17)
AST SERPL-CCNC: 62 U/L — HIGH (ref 10–40)
BILIRUB SERPL-MCNC: 0.4 MG/DL — SIGNIFICANT CHANGE UP (ref 0.2–1.2)
BUN SERPL-MCNC: 7 MG/DL — SIGNIFICANT CHANGE UP (ref 7–23)
CALCIUM SERPL-MCNC: 9.9 MG/DL — SIGNIFICANT CHANGE UP (ref 8.4–10.5)
CHLORIDE SERPL-SCNC: 100 MMOL/L — SIGNIFICANT CHANGE UP (ref 96–108)
CO2 SERPL-SCNC: 22 MMOL/L — SIGNIFICANT CHANGE UP (ref 22–31)
CREAT SERPL-MCNC: 0.69 MG/DL — SIGNIFICANT CHANGE UP (ref 0.5–1.3)
GLUCOSE SERPL-MCNC: 99 MG/DL — SIGNIFICANT CHANGE UP (ref 70–99)
HCT VFR BLD CALC: 38.7 % — SIGNIFICANT CHANGE UP (ref 34.5–45)
HGB BLD-MCNC: 13.6 G/DL — SIGNIFICANT CHANGE UP (ref 11.5–15.5)
LIDOCAIN IGE QN: 16 U/L — SIGNIFICANT CHANGE UP (ref 7–60)
MAGNESIUM SERPL-MCNC: 1.8 MG/DL — SIGNIFICANT CHANGE UP (ref 1.6–2.6)
MCHC RBC-ENTMCNC: 33.4 PG — SIGNIFICANT CHANGE UP (ref 27–34)
MCHC RBC-ENTMCNC: 35.1 GM/DL — SIGNIFICANT CHANGE UP (ref 32–36)
MCV RBC AUTO: 95.1 FL — SIGNIFICANT CHANGE UP (ref 80–100)
PHOSPHATE SERPL-MCNC: 4.4 MG/DL — SIGNIFICANT CHANGE UP (ref 2.5–4.5)
PLATELET # BLD AUTO: 292 K/UL — SIGNIFICANT CHANGE UP (ref 150–400)
POTASSIUM SERPL-MCNC: 4.6 MMOL/L — SIGNIFICANT CHANGE UP (ref 3.5–5.3)
POTASSIUM SERPL-SCNC: 4.6 MMOL/L — SIGNIFICANT CHANGE UP (ref 3.5–5.3)
PROT SERPL-MCNC: 6.5 G/DL — SIGNIFICANT CHANGE UP (ref 6–8.3)
RBC # BLD: 4.07 M/UL — SIGNIFICANT CHANGE UP (ref 3.8–5.2)
RBC # FLD: 14.4 % — SIGNIFICANT CHANGE UP (ref 10.3–14.5)
SODIUM SERPL-SCNC: 138 MMOL/L — SIGNIFICANT CHANGE UP (ref 135–145)
WBC # BLD: 11.37 K/UL — HIGH (ref 3.8–10.5)
WBC # FLD AUTO: 11.37 K/UL — HIGH (ref 3.8–10.5)

## 2018-09-08 RX ORDER — MAGNESIUM SULFATE 500 MG/ML
2 VIAL (ML) INJECTION ONCE
Qty: 0 | Refills: 0 | Status: COMPLETED | OUTPATIENT
Start: 2018-09-08 | End: 2018-09-08

## 2018-09-08 RX ORDER — KETOROLAC TROMETHAMINE 30 MG/ML
15 SYRINGE (ML) INJECTION ONCE
Qty: 0 | Refills: 0 | Status: DISCONTINUED | OUTPATIENT
Start: 2018-09-08 | End: 2018-09-08

## 2018-09-08 RX ORDER — ACETAMINOPHEN 500 MG
1000 TABLET ORAL ONCE
Qty: 0 | Refills: 0 | Status: COMPLETED | OUTPATIENT
Start: 2018-09-08

## 2018-09-08 RX ORDER — LIPASE/PROTEASE/AMYLASE 16-48-48K
2 CAPSULE,DELAYED RELEASE (ENTERIC COATED) ORAL
Qty: 0 | Refills: 0 | Status: DISCONTINUED | OUTPATIENT
Start: 2018-09-08 | End: 2018-09-18

## 2018-09-08 RX ORDER — SODIUM CHLORIDE 9 MG/ML
3 INJECTION INTRAMUSCULAR; INTRAVENOUS; SUBCUTANEOUS EVERY 8 HOURS
Qty: 0 | Refills: 0 | Status: DISCONTINUED | OUTPATIENT
Start: 2018-09-08 | End: 2018-09-18

## 2018-09-08 RX ORDER — DEXTROSE MONOHYDRATE, SODIUM CHLORIDE, AND POTASSIUM CHLORIDE 50; .745; 4.5 G/1000ML; G/1000ML; G/1000ML
1000 INJECTION, SOLUTION INTRAVENOUS
Qty: 0 | Refills: 0 | Status: DISCONTINUED | OUTPATIENT
Start: 2018-09-08 | End: 2018-09-08

## 2018-09-08 RX ADMIN — HYDROMORPHONE HYDROCHLORIDE 0.5 MILLIGRAM(S): 2 INJECTION INTRAMUSCULAR; INTRAVENOUS; SUBCUTANEOUS at 19:08

## 2018-09-08 RX ADMIN — ENOXAPARIN SODIUM 40 MILLIGRAM(S): 100 INJECTION SUBCUTANEOUS at 21:03

## 2018-09-08 RX ADMIN — HYDROMORPHONE HYDROCHLORIDE 0.5 MILLIGRAM(S): 2 INJECTION INTRAMUSCULAR; INTRAVENOUS; SUBCUTANEOUS at 03:05

## 2018-09-08 RX ADMIN — HYDROMORPHONE HYDROCHLORIDE 0.5 MILLIGRAM(S): 2 INJECTION INTRAMUSCULAR; INTRAVENOUS; SUBCUTANEOUS at 14:12

## 2018-09-08 RX ADMIN — Medication 15 MILLIGRAM(S): at 10:26

## 2018-09-08 RX ADMIN — Medication 15 MILLIGRAM(S): at 10:56

## 2018-09-08 RX ADMIN — ONDANSETRON 4 MILLIGRAM(S): 8 TABLET, FILM COATED ORAL at 19:02

## 2018-09-08 RX ADMIN — HYDROMORPHONE HYDROCHLORIDE 0.5 MILLIGRAM(S): 2 INJECTION INTRAMUSCULAR; INTRAVENOUS; SUBCUTANEOUS at 19:38

## 2018-09-08 RX ADMIN — Medication 50 GRAM(S): at 15:32

## 2018-09-08 RX ADMIN — HYDROMORPHONE HYDROCHLORIDE 0.5 MILLIGRAM(S): 2 INJECTION INTRAMUSCULAR; INTRAVENOUS; SUBCUTANEOUS at 08:41

## 2018-09-08 RX ADMIN — HYDROMORPHONE HYDROCHLORIDE 0.5 MILLIGRAM(S): 2 INJECTION INTRAMUSCULAR; INTRAVENOUS; SUBCUTANEOUS at 13:42

## 2018-09-08 RX ADMIN — HYDROMORPHONE HYDROCHLORIDE 0.5 MILLIGRAM(S): 2 INJECTION INTRAMUSCULAR; INTRAVENOUS; SUBCUTANEOUS at 08:11

## 2018-09-08 RX ADMIN — Medication 2 CAPSULE(S): at 17:26

## 2018-09-08 RX ADMIN — SODIUM CHLORIDE 3 MILLILITER(S): 9 INJECTION INTRAMUSCULAR; INTRAVENOUS; SUBCUTANEOUS at 21:02

## 2018-09-08 RX ADMIN — HYDROMORPHONE HYDROCHLORIDE 0.5 MILLIGRAM(S): 2 INJECTION INTRAMUSCULAR; INTRAVENOUS; SUBCUTANEOUS at 03:35

## 2018-09-08 RX ADMIN — Medication 1 PATCH: at 11:01

## 2018-09-08 NOTE — PROGRESS NOTE ADULT - ASSESSMENT
· Assessment	  51F c hx SLE, chronic pancreatitis, ITP s/p splenectomy, current smoker, pw pancreatitis     Problem/Plan - 1:  ·  Problem:  Gall Bladder Surge:         S/p Lap Mercedes.  Sx/GI f/up  Pain control  Advanced diet as tolerates.

## 2018-09-08 NOTE — DISCHARGE NOTE ADULT - INSTRUCTIONS
low fat diet  Activity as tolerated. Avoid heavy lifting, no heavy exercise  or straining over 15 lbs for the next two weeks;  Driving- Please do not drive until your pain is well controlled and you do not need to take pain medications.  You may shower-Do not submerge or scrub incision sites.  Please pat dry incisions/dressings.  Leave the white steri strips in place, they will fall off on their own in approximately 5-7 days.

## 2018-09-08 NOTE — PROGRESS NOTE ADULT - ASSESSMENT
A/P:  53y old f with chronic pancreatitis, with gallbladder sludge on MRI  -Pain control  -Nausea control  -DVT ppx  -OOB ambulation/IS use  - Appreciate GI recs- started on creon, will advance as tolerated, potential discharge tomorrow  - DVT ppx    Red Sx x90

## 2018-09-08 NOTE — PROGRESS NOTE ADULT - ATTENDING COMMENTS
pt seen and examined.  c/o distension/n/v/pain after eating.  diet decreased to clears by GI.  f/u labs in am.  if improved ok to advance +/- dc home.  if ongoing pain possible EUS/ERCP by GI this week.

## 2018-09-08 NOTE — PROGRESS NOTE ADULT - SUBJECTIVE AND OBJECTIVE BOX
Red Surgery Progress Note    Patient examined at bedside. VENKATESH. Patient reports pain that is associated with pancreatitis but states that post surgical pain is well controlled. Patient is passing flatus but has not had a bowel movement.    Vital Signs Last 24 Hrs  T(C): 36.7 (08 Sep 2018 13:46), Max: 37.1 (07 Sep 2018 23:22)  T(F): 98.1 (08 Sep 2018 13:46), Max: 98.8 (07 Sep 2018 23:22)  HR: 71 (08 Sep 2018 13:46) (62 - 79)  BP: 148/80 (08 Sep 2018 13:46) (122/78 - 189/99)  BP(mean): 118 (07 Sep 2018 19:45) (110 - 136)  RR: 18 (08 Sep 2018 13:46) (16 - 18)  SpO2: 96% (08 Sep 2018 13:46) (93% - 100%)      I&O's Detail    07 Sep 2018 07:01  -  08 Sep 2018 07:00  --------------------------------------------------------  IN:    lactated ringers.: 1400 mL    Solution: 100 mL  Total IN: 1500 mL    OUT:    Voided: 1550 mL  Total OUT: 1550 mL    Total NET: -50 mL      08 Sep 2018 07:01  -  08 Sep 2018 13:49  --------------------------------------------------------  IN:  Total IN: 0 mL    OUT:    Voided: 300 mL  Total OUT: 300 mL    Total NET: -300 mL            PE: Gen: NAD  CV: RRR  Pulm: non-labored  Abd: soft, incisional tenderness, not distended.   Ext: WWP

## 2018-09-08 NOTE — DISCHARGE NOTE ADULT - MEDICATION SUMMARY - MEDICATIONS TO STOP TAKING
I will STOP taking the medications listed below when I get home from the hospital:    Creon 24,000 units oral delayed release capsule  -- 2 cap(s) by mouth 3 times a day

## 2018-09-08 NOTE — DISCHARGE NOTE ADULT - CARE PROVIDER_API CALL
Aris Talamantes), Surgery  [address] [facility]  [address]  [place] [address]  Phone: [phone]  Fax: [phone]    Justin Chávez (ANA MARIA), Internal Medicine  [address] [place] [address]  Phone: [phone]  Fax: [phone]    James Villarreal), Internal Medicine  [address] [place] [address]  Phone: [phone]  Fax: [phone]

## 2018-09-08 NOTE — DISCHARGE NOTE ADULT - CARE PLAN
Principal Discharge DX:	Acute on chronic pancreatitis  Goal:	post operative pain control, tolerate diet, local surgical incision wound care  Assessment and plan of treatment:	Dr. Talamantes, Surgery, in 7 to 10 days.  Call [phone] for appointment. Address of office relocated to [address] [place] [address], [place], [address].  Notify your surgeon and return to ER for temperatures greater than 101, chills sweats, pain not controlled with pain medications, persistent nausea and vomiting, or acutely concerning matters to you, that may require urgent medical attention. Principal Discharge DX:	Acute on chronic pancreatitis  Goal:	post operative pain control, tolerate diet, local surgical incision wound care  Assessment and plan of treatment:	Dr. Talamantes, Surgery, in 7 to 10 days.  Call [phone] for appointment. Address of office: [address] [place] [address], [place], [address].    Also follow up with Dr. Chávez, Gastroenterology.  Call their office [phone] for appointment.      Notify your surgeon and return to ER for temperatures greater than 101, chills sweats, pain not controlled with pain medications, persistent nausea and vomiting, or acutely concerning matters to you, that may require urgent medical attention.    Please follow up with your Primary Care Physician regarding your hospitalization, and schedule an appointment within two weeks after your discharge to review your hospital course.  Please  review all your current medications, and dose adjust as prescribed by your Primary Care Physician.  Call their office for appointment. Principal Discharge DX:	Acute on chronic pancreatitis  Goal:	post operative pain control, tolerate diet, local surgical incision wound care  Assessment and plan of treatment:	Dr. Talamantes, Surgery, in 7 to 10 days.  Call [phone] for appointment. Address of office: [facility] [address], [place], [address].    Also follow up with Dr. Chávez, Gastroenterology.  Call their office [phone] for appointment.      Notify your surgeon and return to ER for temperatures greater than 101, chills sweats, pain not controlled with pain medications, persistent nausea and vomiting, or acutely concerning matters to you, that may require urgent medical attention.    Please follow up with your Primary Care Physician regarding your hospitalization, and schedule an appointment within two weeks after your discharge to review your hospital course.  Please  review all your current medications, and dose adjust as prescribed by your Primary Care Physician.  Call their office for appointment.  Secondary Diagnosis:	Skin lesion  Assessment and plan of treatment:	Skin lesion on right buttock  You were evaluated by Dermatologist  Secondary Diagnosis:	Smoker  Assessment and plan of treatment:	Smoking cessation recommended

## 2018-09-08 NOTE — DISCHARGE NOTE ADULT - PLAN OF CARE
post operative pain control, tolerate diet, local surgical incision wound care Dr. Talamantes, Surgery, in 7 to 10 days.  Call [phone] for appointment. Address of office relocated to [address] [place] [address] [address], [place], [address].  Notify your surgeon and return to ER for temperatures greater than 101, chills sweats, pain not controlled with pain medications, persistent nausea and vomiting, or acutely concerning matters to you, that may require urgent medical attention. Dr. Talamantes, Surgery, in 7 to 10 days.  Call [phone] for appointment. Address of office: [address] [facility] [address], [place], [address].    Also follow up with Dr. Chávez, Gastroenterology.  Call their office [phone] for appointment.      Notify your surgeon and return to ER for temperatures greater than 101, chills sweats, pain not controlled with pain medications, persistent nausea and vomiting, or acutely concerning matters to you, that may require urgent medical attention.    Please follow up with your Primary Care Physician regarding your hospitalization, and schedule an appointment within two weeks after your discharge to review your hospital course.  Please  review all your current medications, and dose adjust as prescribed by your Primary Care Physician.  Call their office for appointment. Skin lesion on right buttock  You were evaluated by Dermatologist Smoking cessation recommended

## 2018-09-08 NOTE — DISCHARGE NOTE ADULT - MEDICATION SUMMARY - MEDICATIONS TO TAKE
I will START or STAY ON the medications listed below when I get home from the hospital:    HYDROmorphone 2 mg oral tablet  -- 1 tab(s) by mouth every 6 hours, As needed, Severe Pain (7 - 10) MDD:4  -- Indication: For Acute on chronic pancreatitis    pancrelipase 36,000 units-114,000 units-180,000 units oral delayed release capsule  -- 2 cap(s) by mouth 3 times a day (with meals)  -- Indication: For Acute on chronic pancreatitis    senna oral tablet  -- 2 tab(s) by mouth once a day (at bedtime)  -- Indication: For Constipation    docusate sodium 100 mg oral capsule  -- 1 cap(s) by mouth 3 times a day  -- Indication: For Constipation    polyethylene glycol 3350 oral powder for reconstitution  -- 17 gram(s) by mouth once a day, As Needed   -- Indication: For Constipation    nicotine 21 mg/24 hr transdermal film, extended release  -- 1 patch by transdermal patch once a day   -- Indication: For Smoking cessation    nicotine 4 mg oral transmucosal gum  -- 1 gum chewed every 6 hours   -- Do not take this drug if you are pregnant.  It is very important that you take or use this exactly as directed.  Do not skip doses or discontinue unless directed by your doctor.    -- Indication: For Smoking cessation    nicotine 4 mg oral transmucosal lozenge  -- 1 lozenge by transmucosal administration every 6 hours   -- Check with your doctor before becoming pregnant.  It is very important that you take or use this exactly as directed.  Do not skip doses or discontinue unless directed by your doctor.    -- Indication: For Smoking cessation

## 2018-09-08 NOTE — DISCHARGE NOTE ADULT - HOSPITAL COURSE
51F c hx SLE, chronic pancreatitis, ITP s/p splenectomy, current smoker, pw epigastric pain.    Pt states that this feels like all of her prior pancreatitis flares. Reports poor po intake over the past 3 days, which is when she started to have abdominal pain and nausea. Denies vomiting. Also reports some runny nose and coughing starting yesterday, but denies fevers, chills, chest pain, SOB. Last BM was today, normal quality. Pt does not take any meds for her lupus, but states it affects her joints and "all of her organs", and she gets intermittent flares.    Pt was admitted to Medicine for symptomatic relief, pain control, IV fluids and further evaluation and treatment. Imaging remarkable for normal abdominal ultrasound. No cholelithiasis or choledocholithiasis. After review of imaging and labs pt noted to have acute pancreatitis without infection or necrosis with plan to  cont LR volume repletion, acute on chronic pancreatitis likely etiology of abd pain, poss 2/2 pancreatic divisum, will get abdominal ultrasound to eval bile ducts, no hx of etoh. will check lipids, cont creon with meals, low fat diet. GI consulted and recommended ERCP.  ERCP remarkable for GB sludge. GI explained to pt regarding the GB sludge. RAP. Pancreas divisum, that lap zuleima may not prevent further attacks of pancreatitis but may be helpful especially if element of chronic cholecystitis or if sludge is contributing to recurrent acute pancreatitis. However it is safer than minor papilla ERCP especially with non dilated pancreatic duct. If recurrent attacks continue after lap zuleima then minor papilla ERCP may be justified in the future    Pt underwent laparoscopic cholecystectomy yesterday.  Pt. tolerated procedure well and was transferred to the recovery room and then the floor without incidence.  Pt. was mainly managed for postoperative pain, and wound care, as well as close monitoring of fluid resuscitation and return of GI function.  Pt has been tolerating a diet, voiding, ambulating, and the pain is now well controlled.   Pt. is ready for discharge home in stable condition, and will follow up in two weeks as an outpatient with Dr. Talamantes, and Gastroenterology as am outpatient.

## 2018-09-08 NOTE — DISCHARGE NOTE ADULT - CARE PROVIDERS DIRECT ADDRESSES
[email or web address]

## 2018-09-08 NOTE — DISCHARGE NOTE ADULT - PATIENT PORTAL LINK FT
You can access the [facility] Patient Portal, offered by [facility], by registering with the following website: [email or web address]

## 2018-09-08 NOTE — PROGRESS NOTE ADULT - SUBJECTIVE AND OBJECTIVE BOX
SUBJECTIVE: Had abdominal pain, nausea and bilious vomiting after eating a few bites of mashed potatoes and chicken for dinner. Better with Zofran and Dilaudid.     MEDICATIONS  (STANDING):  acetaminophen  IVPB .. 1000 milliGRAM(s) IV Intermittent once  amylase/lipase/protease  (CREON 36,000 Units) 2 Capsule(s) Oral three times a day with meals  enoxaparin Injectable 40 milliGRAM(s) SubCutaneous every 24 hours  influenza   Vaccine 0.5 milliLiter(s) IntraMuscular once  nicotine - 21 mG/24Hr(s) Patch 1 patch Transdermal daily  sodium chloride 0.9% lock flush 3 milliLiter(s) IV Push every 8 hours    MEDICATIONS  (PRN):  HYDROmorphone  Injectable 0.5 milliGRAM(s) IV Push every 4 hours PRN Severe Pain (7 - 10)  nicotine  Polacrilex Gum 4 milliGRAM(s) Oral every 6 hours PRN nicotine craving  nicotine  Polacrilex Lozenge 4 milliGRAM(s) Oral every 6 hours PRN nicotine craving  ondansetron Injectable 4 milliGRAM(s) IV Push every 6 hours PRN Nausea and/or Vomiting  oxyCODONE    5 mG/acetaminophen 325 mG 1 Tablet(s) Oral every 4 hours PRN Moderate Pain (4 - 6)      OBJECTIVE:  Vital Signs Last 24 Hrs  T(C): 36.8 (08 Sep 2018 17:02), Max: 37.1 (07 Sep 2018 23:22)  T(F): 98.2 (08 Sep 2018 17:02), Max: 98.8 (07 Sep 2018 23:22)  HR: 78 (08 Sep 2018 17:02) (70 - 79)  BP: 135/61 (08 Sep 2018 17:02) (122/78 - 150/90)  BP(mean): --  RR: 18 (08 Sep 2018 17:02) (16 - 18)  SpO2: 97% (08 Sep 2018 17:02) (93% - 97%)    PHYSICAL EXAM:  Constitutional: alert and oriented, in no acute distress  HEENT: no scleral icterus, no palpable lymph nodes  Cardiovascular: normal heart sounds  Respiratory: clear lungs  Gastrointestinal: soft, mild epigastric tenderness, nondistended, normal bowel sounds,   Extremities: No peripheral edema b/l     LABS:                        13.6   11.37 )-----------( 292      ( 08 Sep 2018 09:09 )             38.7     09-08    138  |  100  |  7   ----------------------------<  99  4.6   |  22  |  0.69    Ca    9.9      08 Sep 2018 07:19  Phos  4.4     09-08  Mg     1.8     09-08    TPro  6.5  /  Alb  4.0  /  TBili  0.4  /  DBili  x   /  AST  62<H>  /  ALT  48<H>  /  AlkPhos  59  09-08    PT/INR - ( 07 Sep 2018 09:09 )   PT: 12.4 sec;   INR: 1.10 ratio         PTT - ( 07 Sep 2018 09:09 )  PTT:29.1 sec    LIVER FUNCTIONS - ( 08 Sep 2018 07:19 )  Alb: 4.0 g/dL / Pro: 6.5 g/dL / ALK PHOS: 59 U/L / ALT: 48 U/L / AST: 62 U/L / GGT: x             RADIOLOGY & ADDITIONAL TESTS:

## 2018-09-08 NOTE — DISCHARGE NOTE ADULT - ADDITIONAL INSTRUCTIONS
GI office in 2 weeks (Justin Chávez (ANA MARIA)). Will remove PD stent in 5-6 weeks. She and  are in agreement with this plan.

## 2018-09-08 NOTE — PROGRESS NOTE ADULT - SUBJECTIVE AND OBJECTIVE BOX
Patient is a 53y old  Female who presents with a chief complaint of Abdominal pain and nausea (08 Sep 2018 13:40)      SUBJECTIVE / OVERNIGHT EVENTS:   Feels better.  Denies CP/SOB/Palpitation/HA.    MEDICATIONS  (STANDING):  acetaminophen  IVPB .. 1000 milliGRAM(s) IV Intermittent once  amylase/lipase/protease  (CREON 36,000 Units) 2 Capsule(s) Oral three times a day with meals  dextrose 5% + sodium chloride 0.45% with potassium chloride 20 mEq/L 1000 milliLiter(s) (100 mL/Hr) IV Continuous <Continuous>  enoxaparin Injectable 40 milliGRAM(s) SubCutaneous every 24 hours  influenza   Vaccine 0.5 milliLiter(s) IntraMuscular once  nicotine - 21 mG/24Hr(s) Patch 1 patch Transdermal daily    MEDICATIONS  (PRN):  HYDROmorphone  Injectable 0.5 milliGRAM(s) IV Push every 4 hours PRN Severe Pain (7 - 10)  nicotine  Polacrilex Gum 4 milliGRAM(s) Oral every 6 hours PRN nicotine craving  nicotine  Polacrilex Lozenge 4 milliGRAM(s) Oral every 6 hours PRN nicotine craving  ondansetron Injectable 4 milliGRAM(s) IV Push every 6 hours PRN Nausea and/or Vomiting  oxyCODONE    5 mG/acetaminophen 325 mG 1 Tablet(s) Oral every 4 hours PRN Moderate Pain (4 - 6)        CAPILLARY BLOOD GLUCOSE        I&O's Summary    07 Sep 2018 07:01  -  08 Sep 2018 07:00  --------------------------------------------------------  IN: 1500 mL / OUT: 1550 mL / NET: -50 mL    08 Sep 2018 07:01  -  08 Sep 2018 16:34  --------------------------------------------------------  IN: 0 mL / OUT: 300 mL / NET: -300 mL        PHYSICAL EXAM:  GENERAL: NAD, well-developed  HEAD:  Atraumatic, Normocephalic  NECK: Supple, No JVD  CHEST/LUNG: Clear to auscultation bilaterally; No wheezing.  HEART: Regular rate and rhythm; No murmurs, rubs, or gallops  ABDOMEN: Soft, Nontender, Nondistended; Bowel sounds present  EXTREMITIES:   No clubbing, cyanosis, or edema  NEUROLOGY: AAO X 3  SKIN: No rashes    LABS:                        13.6   11.37 )-----------( 292      ( 08 Sep 2018 09:09 )             38.7     09-08    138  |  100  |  7   ----------------------------<  99  4.6   |  22  |  0.69    Ca    9.9      08 Sep 2018 07:19  Phos  4.4     09-08  Mg     1.8     09-08    TPro  6.5  /  Alb  4.0  /  TBili  0.4  /  DBili  x   /  AST  62<H>  /  ALT  48<H>  /  AlkPhos  59  09-08    PT/INR - ( 07 Sep 2018 09:09 )   PT: 12.4 sec;   INR: 1.10 ratio         PTT - ( 07 Sep 2018 09:09 )  PTT:29.1 sec        CAPILLARY BLOOD GLUCOSE                    RADIOLOGY & ADDITIONAL TESTS:    Imaging Personally Reviewed:    Consultant(s) Notes Reviewed:      Care Discussed with Consultants/Other Providers:

## 2018-09-09 LAB
ALBUMIN SERPL ELPH-MCNC: 3.7 G/DL — SIGNIFICANT CHANGE UP (ref 3.3–5)
ALP SERPL-CCNC: 51 U/L — SIGNIFICANT CHANGE UP (ref 40–120)
ALT FLD-CCNC: 39 U/L — SIGNIFICANT CHANGE UP (ref 10–45)
AMYLASE P1 CFR SERPL: 37 U/L — SIGNIFICANT CHANGE UP (ref 25–125)
ANION GAP SERPL CALC-SCNC: 13 MMOL/L — SIGNIFICANT CHANGE UP (ref 5–17)
AST SERPL-CCNC: 38 U/L — SIGNIFICANT CHANGE UP (ref 10–40)
BILIRUB SERPL-MCNC: 0.4 MG/DL — SIGNIFICANT CHANGE UP (ref 0.2–1.2)
BUN SERPL-MCNC: 8 MG/DL — SIGNIFICANT CHANGE UP (ref 7–23)
CALCIUM SERPL-MCNC: 9.2 MG/DL — SIGNIFICANT CHANGE UP (ref 8.4–10.5)
CHLORIDE SERPL-SCNC: 100 MMOL/L — SIGNIFICANT CHANGE UP (ref 96–108)
CO2 SERPL-SCNC: 26 MMOL/L — SIGNIFICANT CHANGE UP (ref 22–31)
CREAT SERPL-MCNC: 0.91 MG/DL — SIGNIFICANT CHANGE UP (ref 0.5–1.3)
GLUCOSE SERPL-MCNC: 80 MG/DL — SIGNIFICANT CHANGE UP (ref 70–99)
HCT VFR BLD CALC: 40 % — SIGNIFICANT CHANGE UP (ref 34.5–45)
HGB BLD-MCNC: 13.3 G/DL — SIGNIFICANT CHANGE UP (ref 11.5–15.5)
LIDOCAIN IGE QN: 25 U/L — SIGNIFICANT CHANGE UP (ref 7–60)
MCHC RBC-ENTMCNC: 32.6 PG — SIGNIFICANT CHANGE UP (ref 27–34)
MCHC RBC-ENTMCNC: 33.3 GM/DL — SIGNIFICANT CHANGE UP (ref 32–36)
MCV RBC AUTO: 98 FL — SIGNIFICANT CHANGE UP (ref 80–100)
PLATELET # BLD AUTO: 316 K/UL — SIGNIFICANT CHANGE UP (ref 150–400)
POTASSIUM SERPL-MCNC: 3.9 MMOL/L — SIGNIFICANT CHANGE UP (ref 3.5–5.3)
POTASSIUM SERPL-SCNC: 3.9 MMOL/L — SIGNIFICANT CHANGE UP (ref 3.5–5.3)
PROT SERPL-MCNC: 6.1 G/DL — SIGNIFICANT CHANGE UP (ref 6–8.3)
RBC # BLD: 4.08 M/UL — SIGNIFICANT CHANGE UP (ref 3.8–5.2)
RBC # FLD: 14.6 % — HIGH (ref 10.3–14.5)
SODIUM SERPL-SCNC: 139 MMOL/L — SIGNIFICANT CHANGE UP (ref 135–145)
WBC # BLD: 12.66 K/UL — HIGH (ref 3.8–10.5)
WBC # FLD AUTO: 12.66 K/UL — HIGH (ref 3.8–10.5)

## 2018-09-09 RX ORDER — DEXTROSE MONOHYDRATE, SODIUM CHLORIDE, AND POTASSIUM CHLORIDE 50; .745; 4.5 G/1000ML; G/1000ML; G/1000ML
1000 INJECTION, SOLUTION INTRAVENOUS
Qty: 0 | Refills: 0 | Status: DISCONTINUED | OUTPATIENT
Start: 2018-09-09 | End: 2018-09-09

## 2018-09-09 RX ORDER — ONDANSETRON 8 MG/1
4 TABLET, FILM COATED ORAL ONCE
Qty: 0 | Refills: 0 | Status: DISCONTINUED | OUTPATIENT
Start: 2018-09-09 | End: 2018-09-18

## 2018-09-09 RX ORDER — ACETAMINOPHEN 500 MG
1000 TABLET ORAL ONCE
Qty: 0 | Refills: 0 | Status: COMPLETED | OUTPATIENT
Start: 2018-09-09 | End: 2018-09-09

## 2018-09-09 RX ORDER — DEXTROSE MONOHYDRATE, SODIUM CHLORIDE, AND POTASSIUM CHLORIDE 50; .745; 4.5 G/1000ML; G/1000ML; G/1000ML
1000 INJECTION, SOLUTION INTRAVENOUS
Qty: 0 | Refills: 0 | Status: DISCONTINUED | OUTPATIENT
Start: 2018-09-09 | End: 2018-09-15

## 2018-09-09 RX ADMIN — Medication 400 MILLIGRAM(S): at 12:40

## 2018-09-09 RX ADMIN — HYDROMORPHONE HYDROCHLORIDE 0.5 MILLIGRAM(S): 2 INJECTION INTRAMUSCULAR; INTRAVENOUS; SUBCUTANEOUS at 20:06

## 2018-09-09 RX ADMIN — SODIUM CHLORIDE 3 MILLILITER(S): 9 INJECTION INTRAMUSCULAR; INTRAVENOUS; SUBCUTANEOUS at 06:13

## 2018-09-09 RX ADMIN — HYDROMORPHONE HYDROCHLORIDE 0.5 MILLIGRAM(S): 2 INJECTION INTRAMUSCULAR; INTRAVENOUS; SUBCUTANEOUS at 11:15

## 2018-09-09 RX ADMIN — Medication 1 PATCH: at 12:14

## 2018-09-09 RX ADMIN — HYDROMORPHONE HYDROCHLORIDE 0.5 MILLIGRAM(S): 2 INJECTION INTRAMUSCULAR; INTRAVENOUS; SUBCUTANEOUS at 19:46

## 2018-09-09 RX ADMIN — ENOXAPARIN SODIUM 40 MILLIGRAM(S): 100 INJECTION SUBCUTANEOUS at 21:19

## 2018-09-09 RX ADMIN — HYDROMORPHONE HYDROCHLORIDE 0.5 MILLIGRAM(S): 2 INJECTION INTRAMUSCULAR; INTRAVENOUS; SUBCUTANEOUS at 15:18

## 2018-09-09 RX ADMIN — HYDROMORPHONE HYDROCHLORIDE 0.5 MILLIGRAM(S): 2 INJECTION INTRAMUSCULAR; INTRAVENOUS; SUBCUTANEOUS at 02:58

## 2018-09-09 RX ADMIN — Medication 2 CAPSULE(S): at 17:29

## 2018-09-09 RX ADMIN — HYDROMORPHONE HYDROCHLORIDE 0.5 MILLIGRAM(S): 2 INJECTION INTRAMUSCULAR; INTRAVENOUS; SUBCUTANEOUS at 02:28

## 2018-09-09 RX ADMIN — SODIUM CHLORIDE 3 MILLILITER(S): 9 INJECTION INTRAMUSCULAR; INTRAVENOUS; SUBCUTANEOUS at 21:18

## 2018-09-09 RX ADMIN — Medication 1000 MILLIGRAM(S): at 13:02

## 2018-09-09 RX ADMIN — SODIUM CHLORIDE 3 MILLILITER(S): 9 INJECTION INTRAMUSCULAR; INTRAVENOUS; SUBCUTANEOUS at 12:13

## 2018-09-09 RX ADMIN — HYDROMORPHONE HYDROCHLORIDE 0.5 MILLIGRAM(S): 2 INJECTION INTRAMUSCULAR; INTRAVENOUS; SUBCUTANEOUS at 10:58

## 2018-09-09 RX ADMIN — Medication 2 CAPSULE(S): at 10:32

## 2018-09-09 RX ADMIN — ONDANSETRON 4 MILLIGRAM(S): 8 TABLET, FILM COATED ORAL at 10:47

## 2018-09-09 NOTE — PROVIDER CONTACT NOTE (OTHER) - ACTION/TREATMENT ORDERED:
Zofran was given
Pt to be put on clears. Okay to give Dilaudid early. Pending MRI. Continue to monitor.

## 2018-09-09 NOTE — PROGRESS NOTE ADULT - ASSESSMENT
A/P:  53y old f with chronic pancreatitis, with gallbladder sludge on MRI  -Pain control  -Nausea control  -DVT ppx  -OOB ambulation/IS use  - CLD  - Appreciate GI recs- started on creon, gastric emptying study today or tomorrow, EUS/ERCP on Tuesday  - f/u labs  Red Sx [id number] A/P:  53y old f with chronic pancreatitis, pancreatic divisum and recent finding with gallbladder sludge on MRI s/p lap zuleima with po intolerance  -Pain control  -Nausea control  -DVT ppx  -OOB ambulation/IS use  - CLD  - Appreciate GI recs- started on creon, gastric emptying study today or tomorrow, EUS/ERCP on Tuesday  - f/u labs  Red Sx [id number]

## 2018-09-09 NOTE — PROGRESS NOTE ADULT - SUBJECTIVE AND OBJECTIVE BOX
Red Surgery Progress Note    Patient examined at bedside. Patient reports last night had abdominal pain and vomiting after having some food, was put back on CLD. Was feeling better this AM and was give low fat diet again which made patient feel nauseous and she vomited a small amount of food. Patient continues to complain of epigastric pain. Patient is passing flatus and having BM.    Vital Signs Last 24 Hrs  T(C): 36.5 (09 Sep 2018 09:41), Max: 36.8 (08 Sep 2018 17:02)  T(F): 97.7 (09 Sep 2018 09:41), Max: 98.3 (08 Sep 2018 21:36)  HR: 69 (09 Sep 2018 09:41) (65 - 82)  BP: 124/79 (09 Sep 2018 09:41) (114/73 - 148/80)  BP(mean): --  RR: 18 (09 Sep 2018 09:41) (18 - 18)  SpO2: 98% (09 Sep 2018 09:41) (95% - 98%)    I&O's Summary    08 Sep 2018 07:01  -  09 Sep 2018 07:00  --------------------------------------------------------  IN: 1490 mL / OUT: 900 mL / NET: 590 mL    09 Sep 2018 07:01  -  09 Sep 2018 12:53  --------------------------------------------------------  IN: 0 mL / OUT: 150 mL / NET: -150 mL        PE: Gen: NAD  CV: RRR  Pulm: non-labored  Abd: soft, incisional tenderness, not distended, epigastric TTP  Ext: WWP

## 2018-09-09 NOTE — PROGRESS NOTE ADULT - SUBJECTIVE AND OBJECTIVE BOX
Patient is a 53y old  Female who presents with a chief complaint of Abdominal pain and nausea (09 Sep 2018 12:51)      SUBJECTIVE / OVERNIGHT EVENTS:   Feels better.  Denies CP/SOB/Palpitation/HA.    MEDICATIONS  (STANDING):  amylase/lipase/protease  (CREON 36,000 Units) 2 Capsule(s) Oral three times a day with meals  dextrose 5% + sodium chloride 0.45% with potassium chloride 20 mEq/L 1000 milliLiter(s) (50 mL/Hr) IV Continuous <Continuous>  enoxaparin Injectable 40 milliGRAM(s) SubCutaneous every 24 hours  influenza   Vaccine 0.5 milliLiter(s) IntraMuscular once  nicotine - 21 mG/24Hr(s) Patch 1 patch Transdermal daily  ondansetron Injectable 4 milliGRAM(s) IV Push once  sodium chloride 0.9% lock flush 3 milliLiter(s) IV Push every 8 hours    MEDICATIONS  (PRN):  HYDROmorphone  Injectable 0.5 milliGRAM(s) IV Push every 4 hours PRN Severe Pain (7 - 10)  nicotine  Polacrilex Gum 4 milliGRAM(s) Oral every 6 hours PRN nicotine craving  nicotine  Polacrilex Lozenge 4 milliGRAM(s) Oral every 6 hours PRN nicotine craving  ondansetron Injectable 4 milliGRAM(s) IV Push every 6 hours PRN Nausea and/or Vomiting  oxyCODONE    5 mG/acetaminophen 325 mG 1 Tablet(s) Oral every 4 hours PRN Moderate Pain (4 - 6)        CAPILLARY BLOOD GLUCOSE        I&O's Summary    08 Sep 2018 07:01  -  09 Sep 2018 07:00  --------------------------------------------------------  IN: 1490 mL / OUT: 900 mL / NET: 590 mL    09 Sep 2018 07:01  -  09 Sep 2018 22:03  --------------------------------------------------------  IN: 1000 mL / OUT: 900 mL / NET: 100 mL        PHYSICAL EXAM:  GENERAL: NAD, well-developed  HEAD:  Atraumatic, Normocephalic  NECK: Supple, No JVD  CHEST/LUNG: Clear to auscultation bilaterally; No wheezing.  HEART: Regular rate and rhythm; No murmurs, rubs, or gallops  ABDOMEN: Soft, Nontender, Nondistended; Bowel sounds present  EXTREMITIES:   No clubbing, cyanosis, or edema  NEUROLOGY: AAO X 3  SKIN: No rashes    LABS:                        13.3   12.66 )-----------( 316      ( 09 Sep 2018 08:39 )             40.0     09-09    139  |  100  |  8   ----------------------------<  80  3.9   |  26  |  0.91    Ca    9.2      09 Sep 2018 06:59  Phos  4.4     09-08  Mg     1.8     09-08    TPro  6.1  /  Alb  3.7  /  TBili  0.4  /  DBili  x   /  AST  38  /  ALT  39  /  AlkPhos  51  09-09            CAPILLARY BLOOD GLUCOSE                    RADIOLOGY & ADDITIONAL TESTS:    Imaging Personally Reviewed:    Consultant(s) Notes Reviewed:      Care Discussed with Consultants/Other Providers:

## 2018-09-10 LAB
ALBUMIN SERPL ELPH-MCNC: 3.6 G/DL — SIGNIFICANT CHANGE UP (ref 3.3–5)
ALP SERPL-CCNC: 50 U/L — SIGNIFICANT CHANGE UP (ref 40–120)
ALT FLD-CCNC: 30 U/L — SIGNIFICANT CHANGE UP (ref 10–45)
ANION GAP SERPL CALC-SCNC: 10 MMOL/L — SIGNIFICANT CHANGE UP (ref 5–17)
AST SERPL-CCNC: 27 U/L — SIGNIFICANT CHANGE UP (ref 10–40)
BASOPHILS # BLD AUTO: 0.02 K/UL — SIGNIFICANT CHANGE UP (ref 0–0.2)
BASOPHILS NFR BLD AUTO: 0.2 % — SIGNIFICANT CHANGE UP (ref 0–2)
BILIRUB SERPL-MCNC: 0.4 MG/DL — SIGNIFICANT CHANGE UP (ref 0.2–1.2)
BUN SERPL-MCNC: 6 MG/DL — LOW (ref 7–23)
CALCIUM SERPL-MCNC: 9.3 MG/DL — SIGNIFICANT CHANGE UP (ref 8.4–10.5)
CHLORIDE SERPL-SCNC: 104 MMOL/L — SIGNIFICANT CHANGE UP (ref 96–108)
CO2 SERPL-SCNC: 25 MMOL/L — SIGNIFICANT CHANGE UP (ref 22–31)
CREAT SERPL-MCNC: 0.8 MG/DL — SIGNIFICANT CHANGE UP (ref 0.5–1.3)
EOSINOPHIL # BLD AUTO: 0.21 K/UL — SIGNIFICANT CHANGE UP (ref 0–0.5)
EOSINOPHIL NFR BLD AUTO: 2.3 % — SIGNIFICANT CHANGE UP (ref 0–6)
GLUCOSE SERPL-MCNC: 97 MG/DL — SIGNIFICANT CHANGE UP (ref 70–99)
HCT VFR BLD CALC: 40.4 % — SIGNIFICANT CHANGE UP (ref 34.5–45)
HGB BLD-MCNC: 13.9 G/DL — SIGNIFICANT CHANGE UP (ref 11.5–15.5)
IMM GRANULOCYTES NFR BLD AUTO: 0.1 % — SIGNIFICANT CHANGE UP (ref 0–1.5)
LIDOCAIN IGE QN: 22 U/L — SIGNIFICANT CHANGE UP (ref 7–60)
LYMPHOCYTES # BLD AUTO: 3.38 K/UL — HIGH (ref 1–3.3)
LYMPHOCYTES # BLD AUTO: 36.2 % — SIGNIFICANT CHANGE UP (ref 13–44)
MAGNESIUM SERPL-MCNC: 1.8 MG/DL — SIGNIFICANT CHANGE UP (ref 1.6–2.6)
MCHC RBC-ENTMCNC: 33.3 PG — SIGNIFICANT CHANGE UP (ref 27–34)
MCHC RBC-ENTMCNC: 34.4 GM/DL — SIGNIFICANT CHANGE UP (ref 32–36)
MCV RBC AUTO: 96.7 FL — SIGNIFICANT CHANGE UP (ref 80–100)
MONOCYTES # BLD AUTO: 1.14 K/UL — HIGH (ref 0–0.9)
MONOCYTES NFR BLD AUTO: 12.2 % — SIGNIFICANT CHANGE UP (ref 2–14)
NEUTROPHILS # BLD AUTO: 4.57 K/UL — SIGNIFICANT CHANGE UP (ref 1.8–7.4)
NEUTROPHILS NFR BLD AUTO: 49 % — SIGNIFICANT CHANGE UP (ref 43–77)
PHOSPHATE SERPL-MCNC: 4.4 MG/DL — SIGNIFICANT CHANGE UP (ref 2.5–4.5)
PLATELET # BLD AUTO: 314 K/UL — SIGNIFICANT CHANGE UP (ref 150–400)
POTASSIUM SERPL-MCNC: 3.8 MMOL/L — SIGNIFICANT CHANGE UP (ref 3.5–5.3)
POTASSIUM SERPL-SCNC: 3.8 MMOL/L — SIGNIFICANT CHANGE UP (ref 3.5–5.3)
PROT SERPL-MCNC: 6.1 G/DL — SIGNIFICANT CHANGE UP (ref 6–8.3)
RBC # BLD: 4.18 M/UL — SIGNIFICANT CHANGE UP (ref 3.8–5.2)
RBC # FLD: 14.1 % — SIGNIFICANT CHANGE UP (ref 10.3–14.5)
SODIUM SERPL-SCNC: 139 MMOL/L — SIGNIFICANT CHANGE UP (ref 135–145)
WBC # BLD: 9.33 K/UL — SIGNIFICANT CHANGE UP (ref 3.8–10.5)
WBC # FLD AUTO: 9.33 K/UL — SIGNIFICANT CHANGE UP (ref 3.8–10.5)

## 2018-09-10 RX ORDER — MAGNESIUM SULFATE 500 MG/ML
2 VIAL (ML) INJECTION ONCE
Qty: 0 | Refills: 0 | Status: COMPLETED | OUTPATIENT
Start: 2018-09-10 | End: 2018-09-10

## 2018-09-10 RX ORDER — ALPRAZOLAM 0.25 MG
0.5 TABLET ORAL
Qty: 0 | Refills: 0 | Status: DISCONTINUED | OUTPATIENT
Start: 2018-09-10 | End: 2018-09-17

## 2018-09-10 RX ORDER — POTASSIUM CHLORIDE 20 MEQ
10 PACKET (EA) ORAL ONCE
Qty: 0 | Refills: 0 | Status: COMPLETED | OUTPATIENT
Start: 2018-09-10 | End: 2018-09-10

## 2018-09-10 RX ADMIN — Medication 1 PATCH: at 11:27

## 2018-09-10 RX ADMIN — Medication 1 PATCH: at 11:17

## 2018-09-10 RX ADMIN — HYDROMORPHONE HYDROCHLORIDE 0.5 MILLIGRAM(S): 2 INJECTION INTRAMUSCULAR; INTRAVENOUS; SUBCUTANEOUS at 12:28

## 2018-09-10 RX ADMIN — DEXTROSE MONOHYDRATE, SODIUM CHLORIDE, AND POTASSIUM CHLORIDE 50 MILLILITER(S): 50; .745; 4.5 INJECTION, SOLUTION INTRAVENOUS at 10:09

## 2018-09-10 RX ADMIN — Medication 50 GRAM(S): at 08:40

## 2018-09-10 RX ADMIN — Medication 100 MILLIEQUIVALENT(S): at 10:09

## 2018-09-10 RX ADMIN — HYDROMORPHONE HYDROCHLORIDE 0.5 MILLIGRAM(S): 2 INJECTION INTRAMUSCULAR; INTRAVENOUS; SUBCUTANEOUS at 06:04

## 2018-09-10 RX ADMIN — HYDROMORPHONE HYDROCHLORIDE 0.5 MILLIGRAM(S): 2 INJECTION INTRAMUSCULAR; INTRAVENOUS; SUBCUTANEOUS at 21:00

## 2018-09-10 RX ADMIN — HYDROMORPHONE HYDROCHLORIDE 0.5 MILLIGRAM(S): 2 INJECTION INTRAMUSCULAR; INTRAVENOUS; SUBCUTANEOUS at 16:41

## 2018-09-10 RX ADMIN — HYDROMORPHONE HYDROCHLORIDE 0.5 MILLIGRAM(S): 2 INJECTION INTRAMUSCULAR; INTRAVENOUS; SUBCUTANEOUS at 05:44

## 2018-09-10 RX ADMIN — HYDROMORPHONE HYDROCHLORIDE 0.5 MILLIGRAM(S): 2 INJECTION INTRAMUSCULAR; INTRAVENOUS; SUBCUTANEOUS at 11:58

## 2018-09-10 RX ADMIN — HYDROMORPHONE HYDROCHLORIDE 0.5 MILLIGRAM(S): 2 INJECTION INTRAMUSCULAR; INTRAVENOUS; SUBCUTANEOUS at 21:20

## 2018-09-10 RX ADMIN — SODIUM CHLORIDE 3 MILLILITER(S): 9 INJECTION INTRAMUSCULAR; INTRAVENOUS; SUBCUTANEOUS at 21:02

## 2018-09-10 RX ADMIN — SODIUM CHLORIDE 3 MILLILITER(S): 9 INJECTION INTRAMUSCULAR; INTRAVENOUS; SUBCUTANEOUS at 05:03

## 2018-09-10 RX ADMIN — HYDROMORPHONE HYDROCHLORIDE 0.5 MILLIGRAM(S): 2 INJECTION INTRAMUSCULAR; INTRAVENOUS; SUBCUTANEOUS at 16:11

## 2018-09-10 RX ADMIN — Medication 0.5 MILLIGRAM(S): at 23:11

## 2018-09-10 RX ADMIN — SODIUM CHLORIDE 3 MILLILITER(S): 9 INJECTION INTRAMUSCULAR; INTRAVENOUS; SUBCUTANEOUS at 13:01

## 2018-09-10 RX ADMIN — ENOXAPARIN SODIUM 40 MILLIGRAM(S): 100 INJECTION SUBCUTANEOUS at 21:00

## 2018-09-10 NOTE — DIETITIAN INITIAL EVALUATION ADULT. - OTHER INFO
Patient seen for Length Of Stay on 2MON. Pt reports UBW as 138 pounds, reports weight has been stable PTA. Per most recent RD note (6/2017) pt noted with admission of 147 pounds with accuracy of wt questioned. Since admission pt reports emesis and abdominal pain following PO intake, pt unable to tolerate clears. Pt currently NPO with plan for gastric empty study today. GI following, pt noted with BMs and passing gas.

## 2018-09-10 NOTE — DIETITIAN INITIAL EVALUATION ADULT. - PROBLEM SELECTOR PLAN 3
- other than runny nose, sore throat and small dry cough for 1 day, pt is asymptomatic  - if pt becomes more symptomatic, and concerning clinically, may need to check for strep throat given the hx of splenectomy.

## 2018-09-10 NOTE — PROGRESS NOTE ADULT - ASSESSMENT
· Assessment	  51F c hx SLE, chronic pancreatitis, ITP s/p splenectomy, current smoker, pw pancreatitis     Problem/Plan - 1:  ·  Problem:  Gall Bladder Surge:         S/p Lap Mercedes.  Sx/GI f/up noted.  Pain control    Persistent nausea/Vomiting:  Gastric emptying study  Possible EUS/ERCP  .

## 2018-09-10 NOTE — DIETITIAN INITIAL EVALUATION ADULT. - NS AS NUTRI INTERV MEALS SNACK
General/healthful diet/As medically feasible, recommend advancing diet to clear liquid diet. If tolerated, consider further advancing diet to low fat as tolerated. RD to remain available for further nutritional interventions as indicated/requested by medical team/pt.

## 2018-09-10 NOTE — DIETITIAN INITIAL EVALUATION ADULT. - ORAL INTAKE PTA
Pt reports overall good PO intake PTA but does note abdominal pain x 3 days PTA. Pt denies chewing/swallowing difficulty, nausea, diarrhea, constipation. Confirms NKFA. Denies micronutrient supplementation./good

## 2018-09-10 NOTE — PROGRESS NOTE ADULT - SUBJECTIVE AND OBJECTIVE BOX
Patient is a 53y old  Female who presents with a chief complaint of Abdominal pain and nausea (10 Sep 2018 08:04)      SUBJECTIVE / OVERNIGHT EVENTS:  Nausea  NPO    MEDICATIONS  (STANDING):  amylase/lipase/protease  (CREON 36,000 Units) 2 Capsule(s) Oral three times a day with meals  dextrose 5% + sodium chloride 0.45% with potassium chloride 20 mEq/L 1000 milliLiter(s) (50 mL/Hr) IV Continuous <Continuous>  enoxaparin Injectable 40 milliGRAM(s) SubCutaneous every 24 hours  influenza   Vaccine 0.5 milliLiter(s) IntraMuscular once  nicotine - 21 mG/24Hr(s) Patch 1 patch Transdermal daily  ondansetron Injectable 4 milliGRAM(s) IV Push once  sodium chloride 0.9% lock flush 3 milliLiter(s) IV Push every 8 hours    MEDICATIONS  (PRN):  HYDROmorphone  Injectable 0.5 milliGRAM(s) IV Push every 4 hours PRN Severe Pain (7 - 10)  nicotine  Polacrilex Gum 4 milliGRAM(s) Oral every 6 hours PRN nicotine craving  nicotine  Polacrilex Lozenge 4 milliGRAM(s) Oral every 6 hours PRN nicotine craving  ondansetron Injectable 4 milliGRAM(s) IV Push every 6 hours PRN Nausea and/or Vomiting  oxyCODONE    5 mG/acetaminophen 325 mG 1 Tablet(s) Oral every 4 hours PRN Moderate Pain (4 - 6)        CAPILLARY BLOOD GLUCOSE        I&O's Summary    09 Sep 2018 07:01  -  10 Sep 2018 07:00  --------------------------------------------------------  IN: 1600 mL / OUT: 900 mL / NET: 700 mL    10 Sep 2018 07:01  -  10 Sep 2018 15:39  --------------------------------------------------------  IN: 0 mL / OUT: 0 mL / NET: 0 mL        PHYSICAL EXAM:  GENERAL: NAD, well-developed  HEAD:  Atraumatic, Normocephalic  NECK: Supple, No JVD  CHEST/LUNG: Clear to auscultation bilaterally; No wheezing.  HEART: Regular rate and rhythm; No murmurs, rubs, or gallops  ABDOMEN: Soft, Nontender, Nondistended; Bowel sounds present  EXTREMITIES:   No clubbing, cyanosis, or edema  NEUROLOGY: AAO X 3  SKIN: No rashes    LABS:                        13.9   9.33  )-----------( 314      ( 10 Sep 2018 08:06 )             40.4     09-10    139  |  104  |  6<L>  ----------------------------<  97  3.8   |  25  |  0.80    Ca    9.3      10 Sep 2018 06:45  Phos  4.4     09-10  Mg     1.8     09-10    TPro  6.1  /  Alb  3.6  /  TBili  0.4  /  DBili  x   /  AST  27  /  ALT  30  /  AlkPhos  50  09-10            CAPILLARY BLOOD GLUCOSE                    RADIOLOGY & ADDITIONAL TESTS:    Imaging Personally Reviewed:    Consultant(s) Notes Reviewed:      Care Discussed with Consultants/Other Providers:

## 2018-09-10 NOTE — DIETITIAN INITIAL EVALUATION ADULT. - ENERGY NEEDS
Ht: 66inches, Wt:138 lbs, BMI: 22.3kg/m2, IBW:130 lbs +/- 10%, %IBW:106 %  Edema: none, Skin: free of pressure injuries   Pertinent Information: :  This is a 53y old f with chronic pancreatitis, pancreatic divisum and recent finding with gallbladder sludge on MRI s/p lap zuleima with po intolerance. Plan for gastric emptying study today, EUS/ERCP on tomorrow

## 2018-09-10 NOTE — PROGRESS NOTE ADULT - ASSESSMENT
A/P:  53y old f with chronic pancreatitis, pancreatic divisum and recent finding with gallbladder sludge on MRI s/p lap zuleima with po intolerance  -Pain control  -Nausea control  -DVT ppx  -OOB ambulation/IS use  - CLD  - Appreciate GI recs- started on creon, gastric emptying study today, EUS/ERCP on tomorrow (9/11/18)  - f/u labs      Red Sx [phone]

## 2018-09-10 NOTE — PROGRESS NOTE ADULT - SUBJECTIVE AND OBJECTIVE BOX
Red Surgery Progress Note    Patient examined at bedside. Patient continues to complain of epigastric pain, unable to tolerate diet due to nausea and dry-heaving. Patient is passing flatus and having BM. Aware of gastric emptying study happening today.    Vital Signs Last 24 Hrs  T(C): 36.6 (10 Sep 2018 05:39), Max: 36.8 (09 Sep 2018 21:48)  T(F): 97.9 (10 Sep 2018 05:39), Max: 98.2 (09 Sep 2018 21:48)  HR: 68 (10 Sep 2018 05:39) (65 - 80)  BP: 104/61 (10 Sep 2018 05:39) (104/61 - 154/90)  BP(mean): --  RR: 18 (10 Sep 2018 05:39) (18 - 18)  SpO2: 97% (10 Sep 2018 05:39) (94% - 98%)    I&O's Summary    09 Sep 2018 07:01  -  10 Sep 2018 07:00  --------------------------------------------------------  IN: 1600 mL / OUT: 900 mL / NET: 700 mL        PE: Gen: NAD  CV: RRR  Pulm: non-labored  Abd: soft, incisional tenderness, not distended, epigastric TTP  Ext: WWP

## 2018-09-11 ENCOUNTER — RESULT REVIEW (OUTPATIENT)
Age: 53
End: 2018-09-11

## 2018-09-11 LAB
ANION GAP SERPL CALC-SCNC: 15 MMOL/L — SIGNIFICANT CHANGE UP (ref 5–17)
BUN SERPL-MCNC: 5 MG/DL — LOW (ref 7–23)
CALCIUM SERPL-MCNC: 9.2 MG/DL — SIGNIFICANT CHANGE UP (ref 8.4–10.5)
CHLORIDE SERPL-SCNC: 102 MMOL/L — SIGNIFICANT CHANGE UP (ref 96–108)
CO2 SERPL-SCNC: 21 MMOL/L — LOW (ref 22–31)
CREAT SERPL-MCNC: 0.64 MG/DL — SIGNIFICANT CHANGE UP (ref 0.5–1.3)
GLUCOSE SERPL-MCNC: 92 MG/DL — SIGNIFICANT CHANGE UP (ref 70–99)
HCT VFR BLD CALC: 40.9 % — SIGNIFICANT CHANGE UP (ref 34.5–45)
HGB BLD-MCNC: 14.3 G/DL — SIGNIFICANT CHANGE UP (ref 11.5–15.5)
MAGNESIUM SERPL-MCNC: 1.9 MG/DL — SIGNIFICANT CHANGE UP (ref 1.6–2.6)
MCHC RBC-ENTMCNC: 32.9 PG — SIGNIFICANT CHANGE UP (ref 27–34)
MCHC RBC-ENTMCNC: 35 GM/DL — SIGNIFICANT CHANGE UP (ref 32–36)
MCV RBC AUTO: 94 FL — SIGNIFICANT CHANGE UP (ref 80–100)
PHOSPHATE SERPL-MCNC: 4.7 MG/DL — HIGH (ref 2.5–4.5)
PLATELET # BLD AUTO: 280 K/UL — SIGNIFICANT CHANGE UP (ref 150–400)
POTASSIUM SERPL-MCNC: 3.9 MMOL/L — SIGNIFICANT CHANGE UP (ref 3.5–5.3)
POTASSIUM SERPL-SCNC: 3.9 MMOL/L — SIGNIFICANT CHANGE UP (ref 3.5–5.3)
RBC # BLD: 4.35 M/UL — SIGNIFICANT CHANGE UP (ref 3.8–5.2)
RBC # FLD: 14 % — SIGNIFICANT CHANGE UP (ref 10.3–14.5)
SODIUM SERPL-SCNC: 138 MMOL/L — SIGNIFICANT CHANGE UP (ref 135–145)
WBC # BLD: 9.46 K/UL — SIGNIFICANT CHANGE UP (ref 3.8–10.5)
WBC # FLD AUTO: 9.46 K/UL — SIGNIFICANT CHANGE UP (ref 3.8–10.5)

## 2018-09-11 PROCEDURE — 88312 SPECIAL STAINS GROUP 1: CPT | Mod: 26

## 2018-09-11 PROCEDURE — 88305 TISSUE EXAM BY PATHOLOGIST: CPT | Mod: 26

## 2018-09-11 RX ORDER — MAGNESIUM SULFATE 500 MG/ML
1 VIAL (ML) INJECTION ONCE
Qty: 0 | Refills: 0 | Status: COMPLETED | OUTPATIENT
Start: 2018-09-11 | End: 2018-09-11

## 2018-09-11 RX ORDER — CIPROFLOXACIN LACTATE 400MG/40ML
400 VIAL (ML) INTRAVENOUS EVERY 12 HOURS
Qty: 0 | Refills: 0 | Status: DISCONTINUED | OUTPATIENT
Start: 2018-09-11 | End: 2018-09-17

## 2018-09-11 RX ORDER — SODIUM CHLORIDE 9 MG/ML
1000 INJECTION, SOLUTION INTRAVENOUS
Qty: 0 | Refills: 0 | Status: DISCONTINUED | OUTPATIENT
Start: 2018-09-11 | End: 2018-09-15

## 2018-09-11 RX ADMIN — Medication 100 GRAM(S): at 10:44

## 2018-09-11 RX ADMIN — Medication 1 PATCH: at 11:04

## 2018-09-11 RX ADMIN — DEXTROSE MONOHYDRATE, SODIUM CHLORIDE, AND POTASSIUM CHLORIDE 50 MILLILITER(S): 50; .745; 4.5 INJECTION, SOLUTION INTRAVENOUS at 10:44

## 2018-09-11 RX ADMIN — HYDROMORPHONE HYDROCHLORIDE 0.5 MILLIGRAM(S): 2 INJECTION INTRAMUSCULAR; INTRAVENOUS; SUBCUTANEOUS at 11:33

## 2018-09-11 RX ADMIN — HYDROMORPHONE HYDROCHLORIDE 0.5 MILLIGRAM(S): 2 INJECTION INTRAMUSCULAR; INTRAVENOUS; SUBCUTANEOUS at 06:59

## 2018-09-11 RX ADMIN — SODIUM CHLORIDE 3 MILLILITER(S): 9 INJECTION INTRAMUSCULAR; INTRAVENOUS; SUBCUTANEOUS at 21:48

## 2018-09-11 RX ADMIN — HYDROMORPHONE HYDROCHLORIDE 0.5 MILLIGRAM(S): 2 INJECTION INTRAMUSCULAR; INTRAVENOUS; SUBCUTANEOUS at 22:30

## 2018-09-11 RX ADMIN — Medication 1 PATCH: at 11:08

## 2018-09-11 RX ADMIN — SODIUM CHLORIDE 3 MILLILITER(S): 9 INJECTION INTRAMUSCULAR; INTRAVENOUS; SUBCUTANEOUS at 13:37

## 2018-09-11 RX ADMIN — SODIUM CHLORIDE 3 MILLILITER(S): 9 INJECTION INTRAMUSCULAR; INTRAVENOUS; SUBCUTANEOUS at 05:16

## 2018-09-11 RX ADMIN — HYDROMORPHONE HYDROCHLORIDE 0.5 MILLIGRAM(S): 2 INJECTION INTRAMUSCULAR; INTRAVENOUS; SUBCUTANEOUS at 06:39

## 2018-09-11 RX ADMIN — HYDROMORPHONE HYDROCHLORIDE 0.5 MILLIGRAM(S): 2 INJECTION INTRAMUSCULAR; INTRAVENOUS; SUBCUTANEOUS at 11:03

## 2018-09-11 RX ADMIN — HYDROMORPHONE HYDROCHLORIDE 0.5 MILLIGRAM(S): 2 INJECTION INTRAMUSCULAR; INTRAVENOUS; SUBCUTANEOUS at 21:57

## 2018-09-11 RX ADMIN — ENOXAPARIN SODIUM 40 MILLIGRAM(S): 100 INJECTION SUBCUTANEOUS at 21:58

## 2018-09-11 NOTE — PROGRESS NOTE ADULT - ATTENDING COMMENTS
Pt seen/examined. Agree with above.    - F/U EUS/ERCP  - resume diet as per GI  - no acute surgical issues

## 2018-09-11 NOTE — PROGRESS NOTE ADULT - ASSESSMENT
A/P:  53y old f with chronic pancreatitis, pancreatic divisum and recent finding with gallbladder sludge on MRI s/p lap zuleima with po intolerance      - Pain/Nausea control  - DVT ppx  -OOB ambulation/IS use  - NPO/IVF for EUS/ERCP with GI today  - f/u medicine  - f/u AM labs       Chani Ruiz PA-C    Red Sx [phone]

## 2018-09-11 NOTE — PROGRESS NOTE ADULT - ASSESSMENT
A/P: Pancreas divisum. Persistent abdominal pain and nausea and unable to advance diet. S/p cholecystectomy for GB sludge. Chronic pancreatitis. EUS and ERCP with possible biliary sphincterotomy, minor papilla stenting today. Procedures, risks, benefits, alternatives discussed in detail. All questions answered. Also discussed with her . She understands up to 30% risk of pancreatitis, risks of perforation, bleeding, infection.

## 2018-09-11 NOTE — PROGRESS NOTE ADULT - ASSESSMENT
· Assessment	  51F c hx SLE, chronic pancreatitis, ITP s/p splenectomy, current smoker, pw pancreatitis     Problem/Plan - 1:  ·  Problem:  Gall Bladder Surge:   S/p Lap Mercedes.  Sx/GI f/up noted.  Pain control    Persistent nausea/Vomiting:    For ERCP today.  .

## 2018-09-11 NOTE — PROGRESS NOTE ADULT - SUBJECTIVE AND OBJECTIVE BOX
RED SURGERY DAILY PROGRESS NOTE:       SUBJECTIVE/ROS: Patient seen and examined at bedside.  She still complains of abdominal pain.  She has been NPO for her procedure with GI today.         MEDICATIONS  (STANDING):  amylase/lipase/protease  (CREON 36,000 Units) 2 Capsule(s) Oral three times a day with meals  dextrose 5% + sodium chloride 0.45% with potassium chloride 20 mEq/L 1000 milliLiter(s) (50 mL/Hr) IV Continuous <Continuous>  enoxaparin Injectable 40 milliGRAM(s) SubCutaneous every 24 hours  influenza   Vaccine 0.5 milliLiter(s) IntraMuscular once  nicotine - 21 mG/24Hr(s) Patch 1 patch Transdermal daily  ondansetron Injectable 4 milliGRAM(s) IV Push once  sodium chloride 0.9% lock flush 3 milliLiter(s) IV Push every 8 hours    MEDICATIONS  (PRN):  ALPRAZolam 0.5 milliGRAM(s) Oral two times a day PRN anxiety  HYDROmorphone  Injectable 0.5 milliGRAM(s) IV Push every 4 hours PRN Severe Pain (7 - 10)  nicotine  Polacrilex Gum 4 milliGRAM(s) Oral every 6 hours PRN nicotine craving  nicotine  Polacrilex Lozenge 4 milliGRAM(s) Oral every 6 hours PRN nicotine craving  ondansetron Injectable 4 milliGRAM(s) IV Push every 6 hours PRN Nausea and/or Vomiting  oxyCODONE    5 mG/acetaminophen 325 mG 1 Tablet(s) Oral every 4 hours PRN Moderate Pain (4 - 6)      OBJECTIVE:    Vital Signs Last 24 Hrs  T(C): 36.8 (11 Sep 2018 06:33), Max: 37 (10 Sep 2018 14:25)  T(F): 98.2 (11 Sep 2018 06:33), Max: 98.6 (10 Sep 2018 14:25)  HR: 73 (11 Sep 2018 06:33) (68 - 84)  BP: 127/79 (11 Sep 2018 06:33) (114/74 - 138/81)  BP(mean): --  RR: 18 (11 Sep 2018 06:33) (18 - 18)  SpO2: 96% (11 Sep 2018 06:33) (96% - 98%)        I&O's Detail    10 Sep 2018 07:01  -  11 Sep 2018 07:00  --------------------------------------------------------  IN:    dextrose 5% + sodium chloride 0.45% with potassium chloride 20 mEq/L: 1200 mL    Oral Fluid: 240 mL    Solution: 50 mL  Total IN: 1490 mL    OUT:  Total OUT: 0 mL    Total NET: 1490 mL          Daily     Daily Weight in k.5 (10 Sep 2018 11:00)    LABS:                        13.9   9.33  )-----------( 314      ( 10 Sep 2018 08:06 )             40.4     09-10    139  |  104  |  6<L>  ----------------------------<  97  3.8   |  25  |  0.80    Ca    9.3      10 Sep 2018 06:45  Phos  4.4     09-10  Mg     1.8     10    TPro  6.1  /  Alb  3.6  /  TBili  0.4  /  DBili  x   /  AST  27  /  ALT  30  /  AlkPhos  50  09-10                  PHYSICAL EXAM:  PE: Gen: NAD  Pulm: non-labored  Abd: soft, incisional tenderness, not distended, epigastric TTP  Psych: A&Ox3

## 2018-09-11 NOTE — PROGRESS NOTE ADULT - ASSESSMENT
A/P:  53y old f with chronic pancreatitis, pancreatic divisum and recent finding with gallbladder sludge on MRI s/p lap zuleima with po intolerance. s/p eus/ercp    -s/p EUS/ERCp today. Results reviewed  -Transferred to Medicine Post-op  -F/u GI Recs  -Care per [email or web address] Sx [phone]

## 2018-09-11 NOTE — PROGRESS NOTE ADULT - SUBJECTIVE AND OBJECTIVE BOX
RED SURGERY DAILY PROGRESS NOTE:       SUBJECTIVE/ROS: Patient seen and examined at bedside.  She still complains of abdominal pain.  s/p ERCP         MEDICATIONS  (STANDING):  amylase/lipase/protease  (CREON 36,000 Units) 2 Capsule(s) Oral three times a day with meals  dextrose 5% + sodium chloride 0.45% with potassium chloride 20 mEq/L 1000 milliLiter(s) (50 mL/Hr) IV Continuous <Continuous>  enoxaparin Injectable 40 milliGRAM(s) SubCutaneous every 24 hours  influenza   Vaccine 0.5 milliLiter(s) IntraMuscular once  nicotine - 21 mG/24Hr(s) Patch 1 patch Transdermal daily  ondansetron Injectable 4 milliGRAM(s) IV Push once  sodium chloride 0.9% lock flush 3 milliLiter(s) IV Push every 8 hours    MEDICATIONS  (PRN):  ALPRAZolam 0.5 milliGRAM(s) Oral two times a day PRN anxiety  HYDROmorphone  Injectable 0.5 milliGRAM(s) IV Push every 4 hours PRN Severe Pain (7 - 10)  nicotine  Polacrilex Gum 4 milliGRAM(s) Oral every 6 hours PRN nicotine craving  nicotine  Polacrilex Lozenge 4 milliGRAM(s) Oral every 6 hours PRN nicotine craving  ondansetron Injectable 4 milliGRAM(s) IV Push every 6 hours PRN Nausea and/or Vomiting  oxyCODONE    5 mG/acetaminophen 325 mG 1 Tablet(s) Oral every 4 hours PRN Moderate Pain (4 - 6)      OBJECTIVE:      Vital Signs Last 24 Hrs  T(C): 36.8 (11 Sep 2018 20:15), Max: 36.8 (10 Sep 2018 21:07)  T(F): 98.3 (11 Sep 2018 20:15), Max: 98.3 (10 Sep 2018 21:07)  HR: 89 (11 Sep 2018 20:15) (68 - 89)  BP: 145/90 (11 Sep 2018 20:15) (111/62 - 145/93)  BP(mean): --  RR: 18 (11 Sep 2018 20:15) (18 - 18)  SpO2: 98% (11 Sep 2018 20:15) (96% - 99%)    I&O's Summary    10 Sep 2018 07:01  -  11 Sep 2018 07:00  --------------------------------------------------------  IN: 1490 mL / OUT: 0 mL / NET: 1490 mL    11 Sep 2018 07:01  -  11 Sep 2018 20:34  --------------------------------------------------------  IN: 325 mL / OUT: 425 mL / NET: -100 mL            Daily     Daily Weight in k.5 (10 Sep 2018 11:00)    LABS:                        13.9   9.33  )-----------( 314      ( 10 Sep 2018 08:06 )             40.4     09-10    139  |  104  |  6<L>  ----------------------------<  97  3.8   |  25  |  0.80    Ca    9.3      10 Sep 2018 06:45  Phos  4.4     -10  Mg     1.8     10    TPro  6.1  /  Alb  3.6  /  TBili  0.4  /  DBili  x   /  AST  27  /  ALT  30  /  AlkPhos  50  09-10                  PHYSICAL EXAM:  PE: Gen: NAD  Pulm: non-labored  Abd: soft, incisional tenderness, not distended, epigastric TTP  Psych: A&Ox3

## 2018-09-11 NOTE — PROGRESS NOTE ADULT - SUBJECTIVE AND OBJECTIVE BOX
SUBJECTIVE: Continues to have nausea, poor po intake and epigastric pain. Can not undergo GES due to being on dilaudid.     MEDICATIONS  (STANDING):  amylase/lipase/protease  (CREON 36,000 Units) 2 Capsule(s) Oral three times a day with meals  dextrose 5% + sodium chloride 0.45% with potassium chloride 20 mEq/L 1000 milliLiter(s) (50 mL/Hr) IV Continuous <Continuous>  enoxaparin Injectable 40 milliGRAM(s) SubCutaneous every 24 hours  influenza   Vaccine 0.5 milliLiter(s) IntraMuscular once  nicotine - 21 mG/24Hr(s) Patch 1 patch Transdermal daily  ondansetron Injectable 4 milliGRAM(s) IV Push once  sodium chloride 0.9% lock flush 3 milliLiter(s) IV Push every 8 hours    MEDICATIONS  (PRN):  ALPRAZolam 0.5 milliGRAM(s) Oral two times a day PRN anxiety  HYDROmorphone  Injectable 0.5 milliGRAM(s) IV Push every 4 hours PRN Severe Pain (7 - 10)  nicotine  Polacrilex Gum 4 milliGRAM(s) Oral every 6 hours PRN nicotine craving  nicotine  Polacrilex Lozenge 4 milliGRAM(s) Oral every 6 hours PRN nicotine craving  ondansetron Injectable 4 milliGRAM(s) IV Push every 6 hours PRN Nausea and/or Vomiting  oxyCODONE    5 mG/acetaminophen 325 mG 1 Tablet(s) Oral every 4 hours PRN Moderate Pain (4 - 6)      OBJECTIVE:  Vital Signs Last 24 Hrs  T(C): 36.8 (11 Sep 2018 14:21), Max: 36.9 (10 Sep 2018 17:12)  T(F): 98.2 (11 Sep 2018 14:21), Max: 98.5 (10 Sep 2018 17:12)  HR: 73 (11 Sep 2018 14:21) (68 - 82)  BP: 111/62 (11 Sep 2018 14:21) (111/62 - 145/93)  BP(mean): --  RR: 18 (11 Sep 2018 14:21) (18 - 18)  SpO2: 99% (11 Sep 2018 14:21) (96% - 99%)    PHYSICAL EXAM:  Constitutional: alert and oriented, in no acute distress  HEENT: no scleral icterus, no palpable lymph nodes  Cardiovascular: normal heart sounds  Respiratory: clear lungs  Gastrointestinal: soft, mild epigastric tenderness, nondistended, normal bowel sounds,   Extremities: No peripheral edema b/l     LABS:                        14.3   9.46  )-----------( 280      ( 11 Sep 2018 09:09 )             40.9     09-11    138  |  102  |  5<L>  ----------------------------<  92  3.9   |  21<L>  |  0.64    Ca    9.2      11 Sep 2018 07:08  Phos  4.7     09-11  Mg     1.9     09-11    TPro  6.1  /  Alb  3.6  /  TBili  0.4  /  DBili  x   /  AST  27  /  ALT  30  /  AlkPhos  50  09-10        LIVER FUNCTIONS - ( 10 Sep 2018 06:45 )  Alb: 3.6 g/dL / Pro: 6.1 g/dL / ALK PHOS: 50 U/L / ALT: 30 U/L / AST: 27 U/L / GGT: x             RADIOLOGY & ADDITIONAL TESTS:

## 2018-09-12 LAB
ALBUMIN SERPL ELPH-MCNC: 4 G/DL — SIGNIFICANT CHANGE UP (ref 3.3–5)
ALP SERPL-CCNC: 62 U/L — SIGNIFICANT CHANGE UP (ref 40–120)
ALT FLD-CCNC: 63 U/L — HIGH (ref 10–45)
AMYLASE P1 CFR SERPL: 242 U/L — HIGH (ref 25–125)
ANION GAP SERPL CALC-SCNC: 16 MMOL/L — SIGNIFICANT CHANGE UP (ref 5–17)
ANION GAP SERPL CALC-SCNC: 16 MMOL/L — SIGNIFICANT CHANGE UP (ref 5–17)
AST SERPL-CCNC: 62 U/L — HIGH (ref 10–40)
BILIRUB SERPL-MCNC: 0.4 MG/DL — SIGNIFICANT CHANGE UP (ref 0.2–1.2)
BUN SERPL-MCNC: 7 MG/DL — SIGNIFICANT CHANGE UP (ref 7–23)
BUN SERPL-MCNC: 8 MG/DL — SIGNIFICANT CHANGE UP (ref 7–23)
CALCIUM SERPL-MCNC: 10.1 MG/DL — SIGNIFICANT CHANGE UP (ref 8.4–10.5)
CALCIUM SERPL-MCNC: 9.8 MG/DL — SIGNIFICANT CHANGE UP (ref 8.4–10.5)
CHLORIDE SERPL-SCNC: 96 MMOL/L — SIGNIFICANT CHANGE UP (ref 96–108)
CHLORIDE SERPL-SCNC: 96 MMOL/L — SIGNIFICANT CHANGE UP (ref 96–108)
CO2 SERPL-SCNC: 24 MMOL/L — SIGNIFICANT CHANGE UP (ref 22–31)
CO2 SERPL-SCNC: 25 MMOL/L — SIGNIFICANT CHANGE UP (ref 22–31)
CREAT SERPL-MCNC: 0.74 MG/DL — SIGNIFICANT CHANGE UP (ref 0.5–1.3)
CREAT SERPL-MCNC: 0.8 MG/DL — SIGNIFICANT CHANGE UP (ref 0.5–1.3)
GLUCOSE SERPL-MCNC: 131 MG/DL — HIGH (ref 70–99)
GLUCOSE SERPL-MCNC: 90 MG/DL — SIGNIFICANT CHANGE UP (ref 70–99)
HCT VFR BLD CALC: 43 % — SIGNIFICANT CHANGE UP (ref 34.5–45)
HGB BLD-MCNC: 15 G/DL — SIGNIFICANT CHANGE UP (ref 11.5–15.5)
LIDOCAIN IGE QN: 405 U/L — HIGH (ref 7–60)
MAGNESIUM SERPL-MCNC: 1.8 MG/DL — SIGNIFICANT CHANGE UP (ref 1.6–2.6)
MCHC RBC-ENTMCNC: 33 PG — SIGNIFICANT CHANGE UP (ref 27–34)
MCHC RBC-ENTMCNC: 34.9 GM/DL — SIGNIFICANT CHANGE UP (ref 32–36)
MCV RBC AUTO: 94.7 FL — SIGNIFICANT CHANGE UP (ref 80–100)
PHOSPHATE SERPL-MCNC: 4.7 MG/DL — HIGH (ref 2.5–4.5)
PLATELET # BLD AUTO: 360 K/UL — SIGNIFICANT CHANGE UP (ref 150–400)
POTASSIUM SERPL-MCNC: 5.3 MMOL/L — SIGNIFICANT CHANGE UP (ref 3.5–5.3)
POTASSIUM SERPL-MCNC: 5.7 MMOL/L — HIGH (ref 3.5–5.3)
POTASSIUM SERPL-SCNC: 5.3 MMOL/L — SIGNIFICANT CHANGE UP (ref 3.5–5.3)
POTASSIUM SERPL-SCNC: 5.7 MMOL/L — HIGH (ref 3.5–5.3)
PROT SERPL-MCNC: 6.8 G/DL — SIGNIFICANT CHANGE UP (ref 6–8.3)
RBC # BLD: 4.54 M/UL — SIGNIFICANT CHANGE UP (ref 3.8–5.2)
RBC # FLD: 13.6 % — SIGNIFICANT CHANGE UP (ref 10.3–14.5)
SODIUM SERPL-SCNC: 136 MMOL/L — SIGNIFICANT CHANGE UP (ref 135–145)
SODIUM SERPL-SCNC: 137 MMOL/L — SIGNIFICANT CHANGE UP (ref 135–145)
WBC # BLD: 11.05 K/UL — HIGH (ref 3.8–10.5)
WBC # FLD AUTO: 11.05 K/UL — HIGH (ref 3.8–10.5)

## 2018-09-12 RX ORDER — HYDROMORPHONE HYDROCHLORIDE 2 MG/ML
1 INJECTION INTRAMUSCULAR; INTRAVENOUS; SUBCUTANEOUS EVERY 4 HOURS
Qty: 0 | Refills: 0 | Status: DISCONTINUED | OUTPATIENT
Start: 2018-09-12 | End: 2018-09-17

## 2018-09-12 RX ORDER — HYDROMORPHONE HYDROCHLORIDE 2 MG/ML
0.5 INJECTION INTRAMUSCULAR; INTRAVENOUS; SUBCUTANEOUS ONCE
Qty: 0 | Refills: 0 | Status: DISCONTINUED | OUTPATIENT
Start: 2018-09-12 | End: 2018-09-12

## 2018-09-12 RX ADMIN — HYDROMORPHONE HYDROCHLORIDE 1 MILLIGRAM(S): 2 INJECTION INTRAMUSCULAR; INTRAVENOUS; SUBCUTANEOUS at 21:15

## 2018-09-12 RX ADMIN — HYDROMORPHONE HYDROCHLORIDE 0.5 MILLIGRAM(S): 2 INJECTION INTRAMUSCULAR; INTRAVENOUS; SUBCUTANEOUS at 03:00

## 2018-09-12 RX ADMIN — HYDROMORPHONE HYDROCHLORIDE 1 MILLIGRAM(S): 2 INJECTION INTRAMUSCULAR; INTRAVENOUS; SUBCUTANEOUS at 17:12

## 2018-09-12 RX ADMIN — HYDROMORPHONE HYDROCHLORIDE 1 MILLIGRAM(S): 2 INJECTION INTRAMUSCULAR; INTRAVENOUS; SUBCUTANEOUS at 13:39

## 2018-09-12 RX ADMIN — Medication 1 PATCH: at 12:39

## 2018-09-12 RX ADMIN — HYDROMORPHONE HYDROCHLORIDE 1 MILLIGRAM(S): 2 INJECTION INTRAMUSCULAR; INTRAVENOUS; SUBCUTANEOUS at 09:07

## 2018-09-12 RX ADMIN — HYDROMORPHONE HYDROCHLORIDE 1 MILLIGRAM(S): 2 INJECTION INTRAMUSCULAR; INTRAVENOUS; SUBCUTANEOUS at 13:09

## 2018-09-12 RX ADMIN — HYDROMORPHONE HYDROCHLORIDE 0.5 MILLIGRAM(S): 2 INJECTION INTRAMUSCULAR; INTRAVENOUS; SUBCUTANEOUS at 02:24

## 2018-09-12 RX ADMIN — Medication 200 MILLIGRAM(S): at 04:55

## 2018-09-12 RX ADMIN — HYDROMORPHONE HYDROCHLORIDE 1 MILLIGRAM(S): 2 INJECTION INTRAMUSCULAR; INTRAVENOUS; SUBCUTANEOUS at 09:37

## 2018-09-12 RX ADMIN — HYDROMORPHONE HYDROCHLORIDE 0.5 MILLIGRAM(S): 2 INJECTION INTRAMUSCULAR; INTRAVENOUS; SUBCUTANEOUS at 01:59

## 2018-09-12 RX ADMIN — SODIUM CHLORIDE 100 MILLILITER(S): 9 INJECTION, SOLUTION INTRAVENOUS at 17:13

## 2018-09-12 RX ADMIN — Medication 1 PATCH: at 12:40

## 2018-09-12 RX ADMIN — HYDROMORPHONE HYDROCHLORIDE 1 MILLIGRAM(S): 2 INJECTION INTRAMUSCULAR; INTRAVENOUS; SUBCUTANEOUS at 22:00

## 2018-09-12 RX ADMIN — SODIUM CHLORIDE 3 MILLILITER(S): 9 INJECTION INTRAMUSCULAR; INTRAVENOUS; SUBCUTANEOUS at 04:51

## 2018-09-12 RX ADMIN — SODIUM CHLORIDE 3 MILLILITER(S): 9 INJECTION INTRAMUSCULAR; INTRAVENOUS; SUBCUTANEOUS at 20:59

## 2018-09-12 RX ADMIN — HYDROMORPHONE HYDROCHLORIDE 1 MILLIGRAM(S): 2 INJECTION INTRAMUSCULAR; INTRAVENOUS; SUBCUTANEOUS at 06:07

## 2018-09-12 RX ADMIN — HYDROMORPHONE HYDROCHLORIDE 1 MILLIGRAM(S): 2 INJECTION INTRAMUSCULAR; INTRAVENOUS; SUBCUTANEOUS at 17:42

## 2018-09-12 RX ADMIN — ENOXAPARIN SODIUM 40 MILLIGRAM(S): 100 INJECTION SUBCUTANEOUS at 21:15

## 2018-09-12 RX ADMIN — SODIUM CHLORIDE 3 MILLILITER(S): 9 INJECTION INTRAMUSCULAR; INTRAVENOUS; SUBCUTANEOUS at 13:02

## 2018-09-12 RX ADMIN — Medication 200 MILLIGRAM(S): at 17:12

## 2018-09-12 RX ADMIN — HYDROMORPHONE HYDROCHLORIDE 1 MILLIGRAM(S): 2 INJECTION INTRAMUSCULAR; INTRAVENOUS; SUBCUTANEOUS at 05:11

## 2018-09-12 NOTE — ANESTHESIA FOLLOW-UP NOTE - NSEVALATION_GEN_ALL_CORE
No apparent complications or complaints regarding anesthesia care at this time/All questions were answered
No apparent complications or complaints regarding anesthesia care at this time

## 2018-09-12 NOTE — PROGRESS NOTE ADULT - ASSESSMENT
Pancreas divisum related chronic and recurrent acute pancreatitis s/p ERP with minor papilla stent placement. Post ERP pancreatitis. Continue NPO, IVF, Pain management. Clear tomorrow. Monitor labs.

## 2018-09-12 NOTE — PROGRESS NOTE ADULT - SUBJECTIVE AND OBJECTIVE BOX
Patient is a 53y old  Female who presents with a chief complaint of Abdominal pain and nausea (11 Sep 2018 20:33)      SUBJECTIVE / OVERNIGHT EVENTS:  Abd pain  Post procedure pancreatitis  Denies CP/SOB/Palpitation/HA.    MEDICATIONS  (STANDING):  amylase/lipase/protease  (CREON 36,000 Units) 2 Capsule(s) Oral three times a day with meals  ciprofloxacin   IVPB 400 milliGRAM(s) IV Intermittent every 12 hours  dextrose 5% + sodium chloride 0.45% with potassium chloride 20 mEq/L 1000 milliLiter(s) (50 mL/Hr) IV Continuous <Continuous>  enoxaparin Injectable 40 milliGRAM(s) SubCutaneous every 24 hours  influenza   Vaccine 0.5 milliLiter(s) IntraMuscular once  lactated ringers. 1000 milliLiter(s) (100 mL/Hr) IV Continuous <Continuous>  nicotine - 21 mG/24Hr(s) Patch 1 patch Transdermal daily  ondansetron Injectable 4 milliGRAM(s) IV Push once  sodium chloride 0.9% lock flush 3 milliLiter(s) IV Push every 8 hours    MEDICATIONS  (PRN):  ALPRAZolam 0.5 milliGRAM(s) Oral two times a day PRN anxiety  HYDROmorphone  Injectable 1 milliGRAM(s) IV Push every 4 hours PRN Severe Pain (7 - 10)  nicotine  Polacrilex Gum 4 milliGRAM(s) Oral every 6 hours PRN nicotine craving  nicotine  Polacrilex Lozenge 4 milliGRAM(s) Oral every 6 hours PRN nicotine craving  ondansetron Injectable 4 milliGRAM(s) IV Push every 6 hours PRN Nausea and/or Vomiting  oxyCODONE    5 mG/acetaminophen 325 mG 1 Tablet(s) Oral every 4 hours PRN Moderate Pain (4 - 6)        CAPILLARY BLOOD GLUCOSE        I&O's Summary    11 Sep 2018 07:01  -  12 Sep 2018 07:00  --------------------------------------------------------  IN: 1525 mL / OUT: 925 mL / NET: 600 mL    12 Sep 2018 07:01  -  12 Sep 2018 16:49  --------------------------------------------------------  IN: 1200 mL / OUT: 0 mL / NET: 1200 mL        PHYSICAL EXAM:  GENERAL: NAD, well-developed  HEAD:  Atraumatic, Normocephalic  NECK: Supple, No JVD  CHEST/LUNG: Clear to auscultation bilaterally; No wheezing.  HEART: Regular rate and rhythm; No murmurs, rubs, or gallops  ABDOMEN: Mild tenderness in epi.  EXTREMITIES:   No clubbing, cyanosis, or edema  NEUROLOGY: AAO X 3  SKIN: No rashes    LABS:                        15.0   11.05 )-----------( 360      ( 12 Sep 2018 08:59 )             43.0     09-12    137  |  96  |  7   ----------------------------<  90  5.3   |  25  |  0.80    Ca    10.1      12 Sep 2018 12:36  Phos  4.7     09-12  Mg     1.8     09-12    TPro  6.8  /  Alb  4.0  /  TBili  0.4  /  DBili  x   /  AST  62<H>  /  ALT  63<H>  /  AlkPhos  62  09-12            CAPILLARY BLOOD GLUCOSE                    RADIOLOGY & ADDITIONAL TESTS:    Imaging Personally Reviewed:    Consultant(s) Notes Reviewed:      Care Discussed with Consultants/Other Providers:

## 2018-09-12 NOTE — PROGRESS NOTE ADULT - SUBJECTIVE AND OBJECTIVE BOX
SUBJECTIVE: Epigastric pain better this evening with IV dilaudid. No fever. No nausea. Passing flatus. No SOB    MEDICATIONS  (STANDING):  amylase/lipase/protease  (CREON 36,000 Units) 2 Capsule(s) Oral three times a day with meals  ciprofloxacin   IVPB 400 milliGRAM(s) IV Intermittent every 12 hours  dextrose 5% + sodium chloride 0.45% with potassium chloride 20 mEq/L 1000 milliLiter(s) (50 mL/Hr) IV Continuous <Continuous>  enoxaparin Injectable 40 milliGRAM(s) SubCutaneous every 24 hours  influenza   Vaccine 0.5 milliLiter(s) IntraMuscular once  lactated ringers. 1000 milliLiter(s) (100 mL/Hr) IV Continuous <Continuous>  nicotine - 21 mG/24Hr(s) Patch 1 patch Transdermal daily  ondansetron Injectable 4 milliGRAM(s) IV Push once  sodium chloride 0.9% lock flush 3 milliLiter(s) IV Push every 8 hours    MEDICATIONS  (PRN):  ALPRAZolam 0.5 milliGRAM(s) Oral two times a day PRN anxiety  HYDROmorphone  Injectable 1 milliGRAM(s) IV Push every 4 hours PRN Severe Pain (7 - 10)  nicotine  Polacrilex Gum 4 milliGRAM(s) Oral every 6 hours PRN nicotine craving  nicotine  Polacrilex Lozenge 4 milliGRAM(s) Oral every 6 hours PRN nicotine craving  ondansetron Injectable 4 milliGRAM(s) IV Push every 6 hours PRN Nausea and/or Vomiting  oxyCODONE    5 mG/acetaminophen 325 mG 1 Tablet(s) Oral every 4 hours PRN Moderate Pain (4 - 6)      OBJECTIVE:  Vital Signs Last 24 Hrs  T(C): 36.2 (12 Sep 2018 17:25), Max: 36.8 (11 Sep 2018 20:15)  T(F): 97.1 (12 Sep 2018 17:25), Max: 98.3 (11 Sep 2018 20:15)  HR: 84 (12 Sep 2018 17:25) (79 - 89)  BP: 121/77 (12 Sep 2018 17:25) (121/77 - 156/93)  BP(mean): --  RR: 18 (12 Sep 2018 17:25) (18 - 18)  SpO2: 95% (12 Sep 2018 17:25) (94% - 98%)    PHYSICAL EXAM:  Constitutional: alert and oriented, in no acute distress  HEENT: no scleral icterus, no palpable lymph nodes  Cardiovascular: normal heart sounds  Respiratory: clear lungs  Gastrointestinal: soft, mild epigastric tenderness, nondistended, normal bowel sounds,   Extremities: No peripheral edema b/l     LABS:                        15.0   11.05 )-----------( 360      ( 12 Sep 2018 08:59 )             43.0     09-12    137  |  96  |  7   ----------------------------<  90  5.3   |  25  |  0.80    Ca    10.1      12 Sep 2018 12:36  Phos  4.7     09-12  Mg     1.8     09-12    TPro  6.8  /  Alb  4.0  /  TBili  0.4  /  DBili  x   /  AST  62<H>  /  ALT  63<H>  /  AlkPhos  62  09-12    Lipase: ~400    LIVER FUNCTIONS - ( 12 Sep 2018 06:59 )  Alb: 4.0 g/dL / Pro: 6.8 g/dL / ALK PHOS: 62 U/L / ALT: 63 U/L / AST: 62 U/L / GGT: x             RADIOLOGY & ADDITIONAL TESTS:

## 2018-09-12 NOTE — PROGRESS NOTE ADULT - ASSESSMENT
· Assessment	  51F c hx SLE, chronic pancreatitis, ITP s/p splenectomy, current smoker, pw pancreatitis     Problem/Plan - 1:  · Abd pain sec to post procedure pancreatitis:    NPO  GI f/up  Pain control

## 2018-09-13 LAB
ALBUMIN SERPL ELPH-MCNC: 3.5 G/DL — SIGNIFICANT CHANGE UP (ref 3.3–5)
ALP SERPL-CCNC: 50 U/L — SIGNIFICANT CHANGE UP (ref 40–120)
ALT FLD-CCNC: 36 U/L — SIGNIFICANT CHANGE UP (ref 10–45)
AMYLASE P1 CFR SERPL: 120 U/L — SIGNIFICANT CHANGE UP (ref 25–125)
ANION GAP SERPL CALC-SCNC: 14 MMOL/L — SIGNIFICANT CHANGE UP (ref 5–17)
AST SERPL-CCNC: 26 U/L — SIGNIFICANT CHANGE UP (ref 10–40)
BILIRUB SERPL-MCNC: 0.4 MG/DL — SIGNIFICANT CHANGE UP (ref 0.2–1.2)
BUN SERPL-MCNC: 6 MG/DL — LOW (ref 7–23)
CALCIUM SERPL-MCNC: 9.2 MG/DL — SIGNIFICANT CHANGE UP (ref 8.4–10.5)
CHLORIDE SERPL-SCNC: 98 MMOL/L — SIGNIFICANT CHANGE UP (ref 96–108)
CO2 SERPL-SCNC: 24 MMOL/L — SIGNIFICANT CHANGE UP (ref 22–31)
CREAT SERPL-MCNC: 0.85 MG/DL — SIGNIFICANT CHANGE UP (ref 0.5–1.3)
GLUCOSE SERPL-MCNC: 95 MG/DL — SIGNIFICANT CHANGE UP (ref 70–99)
HCT VFR BLD CALC: 38.5 % — SIGNIFICANT CHANGE UP (ref 34.5–45)
HGB BLD-MCNC: 13.3 G/DL — SIGNIFICANT CHANGE UP (ref 11.5–15.5)
LIDOCAIN IGE QN: 162 U/L — HIGH (ref 7–60)
MCHC RBC-ENTMCNC: 33.1 PG — SIGNIFICANT CHANGE UP (ref 27–34)
MCHC RBC-ENTMCNC: 34.5 GM/DL — SIGNIFICANT CHANGE UP (ref 32–36)
MCV RBC AUTO: 95.8 FL — SIGNIFICANT CHANGE UP (ref 80–100)
PLATELET # BLD AUTO: 310 K/UL — SIGNIFICANT CHANGE UP (ref 150–400)
POTASSIUM SERPL-MCNC: 3.6 MMOL/L — SIGNIFICANT CHANGE UP (ref 3.5–5.3)
POTASSIUM SERPL-SCNC: 3.6 MMOL/L — SIGNIFICANT CHANGE UP (ref 3.5–5.3)
PROT SERPL-MCNC: 5.9 G/DL — LOW (ref 6–8.3)
RBC # BLD: 4.02 M/UL — SIGNIFICANT CHANGE UP (ref 3.8–5.2)
RBC # FLD: 14.7 % — HIGH (ref 10.3–14.5)
SODIUM SERPL-SCNC: 136 MMOL/L — SIGNIFICANT CHANGE UP (ref 135–145)
SURGICAL PATHOLOGY STUDY: SIGNIFICANT CHANGE UP
WBC # BLD: 15.72 K/UL — HIGH (ref 3.8–10.5)
WBC # FLD AUTO: 15.72 K/UL — HIGH (ref 3.8–10.5)

## 2018-09-13 RX ORDER — ACETAMINOPHEN 500 MG
650 TABLET ORAL ONCE
Qty: 0 | Refills: 0 | Status: COMPLETED | OUTPATIENT
Start: 2018-09-13 | End: 2018-09-13

## 2018-09-13 RX ADMIN — Medication 1 PATCH: at 12:06

## 2018-09-13 RX ADMIN — Medication 650 MILLIGRAM(S): at 10:04

## 2018-09-13 RX ADMIN — HYDROMORPHONE HYDROCHLORIDE 1 MILLIGRAM(S): 2 INJECTION INTRAMUSCULAR; INTRAVENOUS; SUBCUTANEOUS at 05:28

## 2018-09-13 RX ADMIN — SODIUM CHLORIDE 3 MILLILITER(S): 9 INJECTION INTRAMUSCULAR; INTRAVENOUS; SUBCUTANEOUS at 13:26

## 2018-09-13 RX ADMIN — SODIUM CHLORIDE 100 MILLILITER(S): 9 INJECTION, SOLUTION INTRAVENOUS at 09:34

## 2018-09-13 RX ADMIN — SODIUM CHLORIDE 3 MILLILITER(S): 9 INJECTION INTRAMUSCULAR; INTRAVENOUS; SUBCUTANEOUS at 05:13

## 2018-09-13 RX ADMIN — HYDROMORPHONE HYDROCHLORIDE 1 MILLIGRAM(S): 2 INJECTION INTRAMUSCULAR; INTRAVENOUS; SUBCUTANEOUS at 18:20

## 2018-09-13 RX ADMIN — HYDROMORPHONE HYDROCHLORIDE 1 MILLIGRAM(S): 2 INJECTION INTRAMUSCULAR; INTRAVENOUS; SUBCUTANEOUS at 14:18

## 2018-09-13 RX ADMIN — HYDROMORPHONE HYDROCHLORIDE 1 MILLIGRAM(S): 2 INJECTION INTRAMUSCULAR; INTRAVENOUS; SUBCUTANEOUS at 22:41

## 2018-09-13 RX ADMIN — SODIUM CHLORIDE 3 MILLILITER(S): 9 INJECTION INTRAMUSCULAR; INTRAVENOUS; SUBCUTANEOUS at 22:41

## 2018-09-13 RX ADMIN — Medication 1 PATCH: at 12:05

## 2018-09-13 RX ADMIN — Medication 200 MILLIGRAM(S): at 18:20

## 2018-09-13 RX ADMIN — HYDROMORPHONE HYDROCHLORIDE 1 MILLIGRAM(S): 2 INJECTION INTRAMUSCULAR; INTRAVENOUS; SUBCUTANEOUS at 23:10

## 2018-09-13 RX ADMIN — HYDROMORPHONE HYDROCHLORIDE 1 MILLIGRAM(S): 2 INJECTION INTRAMUSCULAR; INTRAVENOUS; SUBCUTANEOUS at 09:34

## 2018-09-13 RX ADMIN — ENOXAPARIN SODIUM 40 MILLIGRAM(S): 100 INJECTION SUBCUTANEOUS at 22:41

## 2018-09-13 RX ADMIN — HYDROMORPHONE HYDROCHLORIDE 1 MILLIGRAM(S): 2 INJECTION INTRAMUSCULAR; INTRAVENOUS; SUBCUTANEOUS at 01:23

## 2018-09-13 RX ADMIN — Medication 650 MILLIGRAM(S): at 09:28

## 2018-09-13 RX ADMIN — HYDROMORPHONE HYDROCHLORIDE 1 MILLIGRAM(S): 2 INJECTION INTRAMUSCULAR; INTRAVENOUS; SUBCUTANEOUS at 14:48

## 2018-09-13 RX ADMIN — HYDROMORPHONE HYDROCHLORIDE 1 MILLIGRAM(S): 2 INJECTION INTRAMUSCULAR; INTRAVENOUS; SUBCUTANEOUS at 10:04

## 2018-09-13 RX ADMIN — Medication 200 MILLIGRAM(S): at 05:27

## 2018-09-13 RX ADMIN — HYDROMORPHONE HYDROCHLORIDE 1 MILLIGRAM(S): 2 INJECTION INTRAMUSCULAR; INTRAVENOUS; SUBCUTANEOUS at 02:06

## 2018-09-13 NOTE — PROGRESS NOTE ADULT - SUBJECTIVE AND OBJECTIVE BOX
SUBJECTIVE: Tolerated clears. Moderate epigastric pain post dinner. Now better. Walking. Passing flatus. No fever.     MEDICATIONS  (STANDING):  amylase/lipase/protease  (CREON 36,000 Units) 2 Capsule(s) Oral three times a day with meals  ciprofloxacin   IVPB 400 milliGRAM(s) IV Intermittent every 12 hours  dextrose 5% + sodium chloride 0.45% with potassium chloride 20 mEq/L 1000 milliLiter(s) (50 mL/Hr) IV Continuous <Continuous>  enoxaparin Injectable 40 milliGRAM(s) SubCutaneous every 24 hours  influenza   Vaccine 0.5 milliLiter(s) IntraMuscular once  lactated ringers. 1000 milliLiter(s) (100 mL/Hr) IV Continuous <Continuous>  nicotine - 21 mG/24Hr(s) Patch 1 patch Transdermal daily  ondansetron Injectable 4 milliGRAM(s) IV Push once  sodium chloride 0.9% lock flush 3 milliLiter(s) IV Push every 8 hours    MEDICATIONS  (PRN):  ALPRAZolam 0.5 milliGRAM(s) Oral two times a day PRN anxiety  HYDROmorphone  Injectable 1 milliGRAM(s) IV Push every 4 hours PRN Severe Pain (7 - 10)  nicotine  Polacrilex Gum 4 milliGRAM(s) Oral every 6 hours PRN nicotine craving  nicotine  Polacrilex Lozenge 4 milliGRAM(s) Oral every 6 hours PRN nicotine craving  ondansetron Injectable 4 milliGRAM(s) IV Push every 6 hours PRN Nausea and/or Vomiting  oxyCODONE    5 mG/acetaminophen 325 mG 1 Tablet(s) Oral every 4 hours PRN Moderate Pain (4 - 6)      OBJECTIVE:  Vital Signs Last 24 Hrs  T(C): 36.7 (13 Sep 2018 17:15), Max: 37.1 (13 Sep 2018 01:31)  T(F): 98 (13 Sep 2018 17:15), Max: 98.8 (13 Sep 2018 01:31)  HR: 81 (13 Sep 2018 17:15) (75 - 90)  BP: 125/74 (13 Sep 2018 18:58) (109/72 - 125/74)  BP(mean): --  RR: 18 (13 Sep 2018 17:15) (18 - 18)  SpO2: 99% (13 Sep 2018 17:15) (94% - 99%)    PHYSICAL EXAM:  Constitutional: alert and oriented, in no acute distress  HEENT: no scleral icterus, no palpable lymph nodes  Cardiovascular: normal heart sounds  Respiratory: clear lungs  Gastrointestinal: soft, mild epigastric tenderness, nondistended, normal bowel sounds,   Extremities: No peripheral edema b/l     LABS:                        13.3   15.72 )-----------( 310      ( 13 Sep 2018 09:25 )             38.5     09-13    136  |  98  |  6<L>  ----------------------------<  95  3.6   |  24  |  0.85    Ca    9.2      13 Sep 2018 07:00  Phos  4.7     09-12  Mg     1.8     09-12    TPro  5.9<L>  /  Alb  3.5  /  TBili  0.4  /  DBili  x   /  AST  26  /  ALT  36  /  AlkPhos  50  09-13        LIVER FUNCTIONS - ( 13 Sep 2018 07:00 )  Alb: 3.5 g/dL / Pro: 5.9 g/dL / ALK PHOS: 50 U/L / ALT: 36 U/L / AST: 26 U/L / GGT: x           Amylase 120, Lipase 162  RADIOLOGY & ADDITIONAL TESTS:

## 2018-09-13 NOTE — PROGRESS NOTE ADULT - ASSESSMENT
A/P:  53y old f with chronic pancreatitis, pancreatic divisum and recent finding with gallbladder sludge on MRI s/p lap zuleima with po intolerance. s/p eus/ercp. Stent placed in dorsal pancreatitic duct, but unable to cannulate major papilla.     - F/u GI Recs  - Care per Van Garland, PGY2  Red Sx [phone] A/P:  53y old f with chronic pancreatitis, pancreatic divisum and recent finding with gallbladder sludge on MRI s/p lap zuleima with po intolerance. s/p eus/ercp. Stent placed in dorsal pancreatitic duct, but unable to cannulate major papilla.     - F/u GI Recs  - Care per Med  - No drainable abscesses; recommend hot pack to area for comfort and to bring any residual purulence to surface    Rafal Garland, PGY2  Red Sx [phone]

## 2018-09-13 NOTE — PROGRESS NOTE ADULT - SUBJECTIVE AND OBJECTIVE BOX
RED SURGERY DAILY PROGRESS NOTE:       SUBJECTIVE/ROS: Patient seen and examined at bedside.  She still complains of abdominal pain, but overall improved. No nausea or vomiting.        OBJECTIVE:  Vital Signs Last 24 Hrs  T(C): 37.1 (13 Sep 2018 01:31), Max: 37.1 (13 Sep 2018 01:31)  T(F): 98.8 (13 Sep 2018 01:31), Max: 98.8 (13 Sep 2018 01:31)  HR: 90 (13 Sep 2018 01:31) (79 - 90)  BP: 113/71 (13 Sep 2018 01:31) (113/71 - 137/92)  BP(mean): --  RR: 18 (13 Sep 2018 01:31) (18 - 18)  SpO2: 94% (13 Sep 2018 01:31) (94% - 98%)    09-11-18 @ 07:01  -  09-12-18 @ 07:00  --------------------------------------------------------  IN: 1525 mL / OUT: 925 mL / NET: 600 mL    09-12-18 @ 07:01  -  09-13-18 @ 05:33  --------------------------------------------------------  IN: 2400 mL / OUT: 0 mL / NET: 2400 mL      MEDICATIONS  (STANDING):  amylase/lipase/protease  (CREON 36,000 Units) 2 Capsule(s) Oral three times a day with meals  ciprofloxacin   IVPB 400 milliGRAM(s) IV Intermittent every 12 hours  dextrose 5% + sodium chloride 0.45% with potassium chloride 20 mEq/L 1000 milliLiter(s) (50 mL/Hr) IV Continuous <Continuous>  enoxaparin Injectable 40 milliGRAM(s) SubCutaneous every 24 hours  influenza   Vaccine 0.5 milliLiter(s) IntraMuscular once  lactated ringers. 1000 milliLiter(s) (100 mL/Hr) IV Continuous <Continuous>  nicotine - 21 mG/24Hr(s) Patch 1 patch Transdermal daily  ondansetron Injectable 4 milliGRAM(s) IV Push once  sodium chloride 0.9% lock flush 3 milliLiter(s) IV Push every 8 hours    MEDICATIONS  (PRN):  ALPRAZolam 0.5 milliGRAM(s) Oral two times a day PRN anxiety  HYDROmorphone  Injectable 1 milliGRAM(s) IV Push every 4 hours PRN Severe Pain (7 - 10)  nicotine  Polacrilex Gum 4 milliGRAM(s) Oral every 6 hours PRN nicotine craving  nicotine  Polacrilex Lozenge 4 milliGRAM(s) Oral every 6 hours PRN nicotine craving  ondansetron Injectable 4 milliGRAM(s) IV Push every 6 hours PRN Nausea and/or Vomiting  oxyCODONE    5 mG/acetaminophen 325 mG 1 Tablet(s) Oral every 4 hours PRN Moderate Pain (4 - 6)    PHYSICAL EXAM:  PE: Gen: NAD  Pulm: non-labored  Abd: soft, incisional tenderness, not distended, epigastric TTP  Psych: A&Ox3    Labs:  CBC (09-12 @ 08:59)                              15.0                           11.05<H>  )----------------(  360        --    % Neutrophils, --    % Lymphocytes, ANC: --                                  43.0                  BMP (09-12 @ 12:36)             137     |  96      |  7     		Ca++ --      Ca 10.1               ---------------------------------( 90    		Mg --                 5.3     |  25      |  0.80  			Ph --      BMP (09-12 @ 06:59)             136     |  96      |  8     		Ca++ --      Ca 9.8                ---------------------------------( 131<H>		Mg 1.8                5.7<H>  |  24      |  0.74  			Ph 4.7<H>    LFTs (09-12 @ 06:59)      TPro 6.8 / Alb 4.0 / TBili 0.4 / DBili -- / AST 62<H> / ALT 63<H> / AlkPhos 62    Rads:  < from: Upper EUS (09.11.18 @ 14:50) >  Impression:            	     - Normal esophagus.                       - Gastritis. Biopsied.                       - Normal examined duodenum. Biopsied.                       - There was no sign of significant pathology in the esophagus.                       - Endosonographic images of the stomach were unremarkable.                       - Evidence of a cholecystectomy.                       - There was no sign of significant pathology in the common bile duct and in                          the common hepatic duct.                       - There was no evidence of significant pathology in the left lobe of the liver.                       - Pancreas divisum was visualized.                       - Endosonographic imaging of the pancreas showed sonographic changes                        consistent with mild-moderate chronic pancreatitis.                       - Endosonographic images of the left adrenal gland were unremarkable.  Recommendation:                           - Await path results.                       - Proceed with ERCP.    < end of copied text >    < from: ERCP (09.11.18 @ 14:46) >  Impression:                                 - The minor papilla had a tiny opening. Pancreas divisum was found. A 5 Fr by                        9 cm pancreatic stent was placed into the dorsal pancreatic duct via the                        minor papilla.                       - Biliary cannulation was not successful.  Recommendation:                             - Observe patient in GI recovery unit for 2 hours for observation prior to                        returning to the floor.                       - Monitor labs and clinically. IV fluids and antibiotics. Pain control. NPO                        tonight.    < end of copied text > RED SURGERY DAILY PROGRESS NOTE:       SUBJECTIVE/ROS: Patient seen and examined at bedside.  She still complains of abdominal pain, but overall improved. No nausea or vomiting.  Also complains of several small bumps to her right buttock. Minimal pain. No drainage.        OBJECTIVE:  Vital Signs Last 24 Hrs  T(C): 37.1 (13 Sep 2018 01:31), Max: 37.1 (13 Sep 2018 01:31)  T(F): 98.8 (13 Sep 2018 01:31), Max: 98.8 (13 Sep 2018 01:31)  HR: 90 (13 Sep 2018 01:31) (79 - 90)  BP: 113/71 (13 Sep 2018 01:31) (113/71 - 137/92)  BP(mean): --  RR: 18 (13 Sep 2018 01:31) (18 - 18)  SpO2: 94% (13 Sep 2018 01:31) (94% - 98%)    09-11-18 @ 07:01  -  09-12-18 @ 07:00  --------------------------------------------------------  IN: 1525 mL / OUT: 925 mL / NET: 600 mL    09-12-18 @ 07:01  -  09-13-18 @ 05:33  --------------------------------------------------------  IN: 2400 mL / OUT: 0 mL / NET: 2400 mL      MEDICATIONS  (STANDING):  amylase/lipase/protease  (CREON 36,000 Units) 2 Capsule(s) Oral three times a day with meals  ciprofloxacin   IVPB 400 milliGRAM(s) IV Intermittent every 12 hours  dextrose 5% + sodium chloride 0.45% with potassium chloride 20 mEq/L 1000 milliLiter(s) (50 mL/Hr) IV Continuous <Continuous>  enoxaparin Injectable 40 milliGRAM(s) SubCutaneous every 24 hours  influenza   Vaccine 0.5 milliLiter(s) IntraMuscular once  lactated ringers. 1000 milliLiter(s) (100 mL/Hr) IV Continuous <Continuous>  nicotine - 21 mG/24Hr(s) Patch 1 patch Transdermal daily  ondansetron Injectable 4 milliGRAM(s) IV Push once  sodium chloride 0.9% lock flush 3 milliLiter(s) IV Push every 8 hours    MEDICATIONS  (PRN):  ALPRAZolam 0.5 milliGRAM(s) Oral two times a day PRN anxiety  HYDROmorphone  Injectable 1 milliGRAM(s) IV Push every 4 hours PRN Severe Pain (7 - 10)  nicotine  Polacrilex Gum 4 milliGRAM(s) Oral every 6 hours PRN nicotine craving  nicotine  Polacrilex Lozenge 4 milliGRAM(s) Oral every 6 hours PRN nicotine craving  ondansetron Injectable 4 milliGRAM(s) IV Push every 6 hours PRN Nausea and/or Vomiting  oxyCODONE    5 mG/acetaminophen 325 mG 1 Tablet(s) Oral every 4 hours PRN Moderate Pain (4 - 6)    PHYSICAL EXAM:  PE: Gen: NAD  Pulm: non-labored  Abd: soft, incisional tenderness, not distended, epigastric TTP  Back: 0.25 cm area of induration x3 to right buttock without surrounding signs of infection, no fluctuance     Labs:  CBC (09-12 @ 08:59)                              15.0                           11.05<H>  )----------------(  360        --    % Neutrophils, --    % Lymphocytes, ANC: --                                  43.0                  BMP (09-12 @ 12:36)             137     |  96      |  7     		Ca++ --      Ca 10.1               ---------------------------------( 90    		Mg --                 5.3     |  25      |  0.80  			Ph --      BMP (09-12 @ 06:59)             136     |  96      |  8     		Ca++ --      Ca 9.8                ---------------------------------( 131<H>		Mg 1.8                5.7<H>  |  24      |  0.74  			Ph 4.7<H>    LFTs (09-12 @ 06:59)      TPro 6.8 / Alb 4.0 / TBili 0.4 / DBili -- / AST 62<H> / ALT 63<H> / AlkPhos 62    Rads:  < from: Upper EUS (09.11.18 @ 14:50) >  Impression:            	     - Normal esophagus.                       - Gastritis. Biopsied.                       - Normal examined duodenum. Biopsied.                       - There was no sign of significant pathology in the esophagus.                       - Endosonographic images of the stomach were unremarkable.                       - Evidence of a cholecystectomy.                       - There was no sign of significant pathology in the common bile duct and in                          the common hepatic duct.                       - There was no evidence of significant pathology in the left lobe of the liver.                       - Pancreas divisum was visualized.                       - Endosonographic imaging of the pancreas showed sonographic changes                        consistent with mild-moderate chronic pancreatitis.                       - Endosonographic images of the left adrenal gland were unremarkable.  Recommendation:                           - Await path results.                       - Proceed with ERCP.    < end of copied text >    < from: ERCP (09.11.18 @ 14:46) >  Impression:                                 - The minor papilla had a tiny opening. Pancreas divisum was found. A 5 Fr by                        9 cm pancreatic stent was placed into the dorsal pancreatic duct via the                        minor papilla.                       - Biliary cannulation was not successful.  Recommendation:                             - Observe patient in GI recovery unit for 2 hours for observation prior to                        returning to the floor.                       - Monitor labs and clinically. IV fluids and antibiotics. Pain control. NPO                        tonight.    < end of copied text >

## 2018-09-13 NOTE — PROGRESS NOTE ADULT - SUBJECTIVE AND OBJECTIVE BOX
Patient is a 53y old  Female who presents with a chief complaint of Abdominal pain and nausea (13 Sep 2018 20:58)      SUBJECTIVE / OVERNIGHT EVENTS:   Feels better.  Denies CP/SOB/Palpitation/HA.    MEDICATIONS  (STANDING):  amylase/lipase/protease  (CREON 36,000 Units) 2 Capsule(s) Oral three times a day with meals  ciprofloxacin   IVPB 400 milliGRAM(s) IV Intermittent every 12 hours  dextrose 5% + sodium chloride 0.45% with potassium chloride 20 mEq/L 1000 milliLiter(s) (50 mL/Hr) IV Continuous <Continuous>  enoxaparin Injectable 40 milliGRAM(s) SubCutaneous every 24 hours  influenza   Vaccine 0.5 milliLiter(s) IntraMuscular once  lactated ringers. 1000 milliLiter(s) (100 mL/Hr) IV Continuous <Continuous>  nicotine - 21 mG/24Hr(s) Patch 1 patch Transdermal daily  ondansetron Injectable 4 milliGRAM(s) IV Push once  sodium chloride 0.9% lock flush 3 milliLiter(s) IV Push every 8 hours    MEDICATIONS  (PRN):  ALPRAZolam 0.5 milliGRAM(s) Oral two times a day PRN anxiety  HYDROmorphone  Injectable 1 milliGRAM(s) IV Push every 4 hours PRN Severe Pain (7 - 10)  nicotine  Polacrilex Gum 4 milliGRAM(s) Oral every 6 hours PRN nicotine craving  nicotine  Polacrilex Lozenge 4 milliGRAM(s) Oral every 6 hours PRN nicotine craving  ondansetron Injectable 4 milliGRAM(s) IV Push every 6 hours PRN Nausea and/or Vomiting  oxyCODONE    5 mG/acetaminophen 325 mG 1 Tablet(s) Oral every 4 hours PRN Moderate Pain (4 - 6)        CAPILLARY BLOOD GLUCOSE        I&O's Summary    12 Sep 2018 07:01  -  13 Sep 2018 07:00  --------------------------------------------------------  IN: 2400 mL / OUT: 0 mL / NET: 2400 mL    13 Sep 2018 07:01  -  13 Sep 2018 23:47  --------------------------------------------------------  IN: 1420 mL / OUT: 0 mL / NET: 1420 mL        PHYSICAL EXAM:  GENERAL: NAD, well-developed  HEAD:  Atraumatic, Normocephalic  NECK: Supple, No JVD  CHEST/LUNG: Clear to auscultation bilaterally; No wheezing.  HEART: Regular rate and rhythm; No murmurs, rubs, or gallops  ABDOMEN: Soft, Nontender, Nondistended; Bowel sounds present  EXTREMITIES:   No clubbing, cyanosis, or edema  NEUROLOGY: AAO X 3  SKIN: No rashes    LABS:                        13.3   15.72 )-----------( 310      ( 13 Sep 2018 09:25 )             38.5     09-13    136  |  98  |  6<L>  ----------------------------<  95  3.6   |  24  |  0.85    Ca    9.2      13 Sep 2018 07:00  Phos  4.7     09-12  Mg     1.8     09-12    TPro  5.9<L>  /  Alb  3.5  /  TBili  0.4  /  DBili  x   /  AST  26  /  ALT  36  /  AlkPhos  50  09-13            CAPILLARY BLOOD GLUCOSE                    RADIOLOGY & ADDITIONAL TESTS:    Imaging Personally Reviewed:    Consultant(s) Notes Reviewed:      Care Discussed with Consultants/Other Providers:

## 2018-09-13 NOTE — PROGRESS NOTE ADULT - ASSESSMENT
A/P: Pancreas divisum with acute on chronic pancreatitis with post ERP pancreatitis. Improving slowly. Continue current management. Monitor labs and clinically. Continue antibiotics for now.

## 2018-09-14 LAB
ALBUMIN SERPL ELPH-MCNC: 3.5 G/DL — SIGNIFICANT CHANGE UP (ref 3.3–5)
ALP SERPL-CCNC: 54 U/L — SIGNIFICANT CHANGE UP (ref 40–120)
ALT FLD-CCNC: 24 U/L — SIGNIFICANT CHANGE UP (ref 10–45)
AMYLASE P1 CFR SERPL: 49 U/L — SIGNIFICANT CHANGE UP (ref 25–125)
ANION GAP SERPL CALC-SCNC: 15 MMOL/L — SIGNIFICANT CHANGE UP (ref 5–17)
AST SERPL-CCNC: 16 U/L — SIGNIFICANT CHANGE UP (ref 10–40)
BILIRUB SERPL-MCNC: 0.5 MG/DL — SIGNIFICANT CHANGE UP (ref 0.2–1.2)
BUN SERPL-MCNC: 4 MG/DL — LOW (ref 7–23)
CALCIUM SERPL-MCNC: 9.2 MG/DL — SIGNIFICANT CHANGE UP (ref 8.4–10.5)
CHLORIDE SERPL-SCNC: 96 MMOL/L — SIGNIFICANT CHANGE UP (ref 96–108)
CO2 SERPL-SCNC: 22 MMOL/L — SIGNIFICANT CHANGE UP (ref 22–31)
CREAT SERPL-MCNC: 0.65 MG/DL — SIGNIFICANT CHANGE UP (ref 0.5–1.3)
GLUCOSE SERPL-MCNC: 106 MG/DL — HIGH (ref 70–99)
HCT VFR BLD CALC: 36.7 % — SIGNIFICANT CHANGE UP (ref 34.5–45)
HGB BLD-MCNC: 12.4 G/DL — SIGNIFICANT CHANGE UP (ref 11.5–15.5)
LIDOCAIN IGE QN: 63 U/L — HIGH (ref 7–60)
MCHC RBC-ENTMCNC: 32 PG — SIGNIFICANT CHANGE UP (ref 27–34)
MCHC RBC-ENTMCNC: 33.8 GM/DL — SIGNIFICANT CHANGE UP (ref 32–36)
MCV RBC AUTO: 94.6 FL — SIGNIFICANT CHANGE UP (ref 80–100)
PLATELET # BLD AUTO: 333 K/UL — SIGNIFICANT CHANGE UP (ref 150–400)
POTASSIUM SERPL-MCNC: 3.4 MMOL/L — LOW (ref 3.5–5.3)
POTASSIUM SERPL-SCNC: 3.4 MMOL/L — LOW (ref 3.5–5.3)
PROT SERPL-MCNC: 6.1 G/DL — SIGNIFICANT CHANGE UP (ref 6–8.3)
RBC # BLD: 3.88 M/UL — SIGNIFICANT CHANGE UP (ref 3.8–5.2)
RBC # FLD: 14.3 % — SIGNIFICANT CHANGE UP (ref 10.3–14.5)
SODIUM SERPL-SCNC: 133 MMOL/L — LOW (ref 135–145)
SURGICAL PATHOLOGY STUDY: SIGNIFICANT CHANGE UP
WBC # BLD: 17.64 K/UL — HIGH (ref 3.8–10.5)
WBC # FLD AUTO: 17.64 K/UL — HIGH (ref 3.8–10.5)

## 2018-09-14 PROCEDURE — 74177 CT ABD & PELVIS W/CONTRAST: CPT | Mod: 26

## 2018-09-14 RX ORDER — METRONIDAZOLE 500 MG
500 TABLET ORAL ONCE
Qty: 0 | Refills: 0 | Status: DISCONTINUED | OUTPATIENT
Start: 2018-09-14 | End: 2018-09-14

## 2018-09-14 RX ORDER — METRONIDAZOLE 500 MG
TABLET ORAL
Qty: 0 | Refills: 0 | Status: DISCONTINUED | OUTPATIENT
Start: 2018-09-14 | End: 2018-09-14

## 2018-09-14 RX ORDER — POTASSIUM CHLORIDE 20 MEQ
40 PACKET (EA) ORAL ONCE
Qty: 0 | Refills: 0 | Status: COMPLETED | OUTPATIENT
Start: 2018-09-14 | End: 2018-09-14

## 2018-09-14 RX ADMIN — SODIUM CHLORIDE 3 MILLILITER(S): 9 INJECTION INTRAMUSCULAR; INTRAVENOUS; SUBCUTANEOUS at 22:09

## 2018-09-14 RX ADMIN — HYDROMORPHONE HYDROCHLORIDE 1 MILLIGRAM(S): 2 INJECTION INTRAMUSCULAR; INTRAVENOUS; SUBCUTANEOUS at 06:44

## 2018-09-14 RX ADMIN — SODIUM CHLORIDE 3 MILLILITER(S): 9 INJECTION INTRAMUSCULAR; INTRAVENOUS; SUBCUTANEOUS at 05:27

## 2018-09-14 RX ADMIN — ENOXAPARIN SODIUM 40 MILLIGRAM(S): 100 INJECTION SUBCUTANEOUS at 23:17

## 2018-09-14 RX ADMIN — HYDROMORPHONE HYDROCHLORIDE 1 MILLIGRAM(S): 2 INJECTION INTRAMUSCULAR; INTRAVENOUS; SUBCUTANEOUS at 12:03

## 2018-09-14 RX ADMIN — HYDROMORPHONE HYDROCHLORIDE 1 MILLIGRAM(S): 2 INJECTION INTRAMUSCULAR; INTRAVENOUS; SUBCUTANEOUS at 23:50

## 2018-09-14 RX ADMIN — SODIUM CHLORIDE 3 MILLILITER(S): 9 INJECTION INTRAMUSCULAR; INTRAVENOUS; SUBCUTANEOUS at 15:16

## 2018-09-14 RX ADMIN — HYDROMORPHONE HYDROCHLORIDE 1 MILLIGRAM(S): 2 INJECTION INTRAMUSCULAR; INTRAVENOUS; SUBCUTANEOUS at 02:37

## 2018-09-14 RX ADMIN — SODIUM CHLORIDE 100 MILLILITER(S): 9 INJECTION, SOLUTION INTRAVENOUS at 05:29

## 2018-09-14 RX ADMIN — HYDROMORPHONE HYDROCHLORIDE 1 MILLIGRAM(S): 2 INJECTION INTRAMUSCULAR; INTRAVENOUS; SUBCUTANEOUS at 23:15

## 2018-09-14 RX ADMIN — Medication 1 PATCH: at 18:28

## 2018-09-14 RX ADMIN — Medication 2 CAPSULE(S): at 12:33

## 2018-09-14 RX ADMIN — HYDROMORPHONE HYDROCHLORIDE 1 MILLIGRAM(S): 2 INJECTION INTRAMUSCULAR; INTRAVENOUS; SUBCUTANEOUS at 10:58

## 2018-09-14 RX ADMIN — HYDROMORPHONE HYDROCHLORIDE 1 MILLIGRAM(S): 2 INJECTION INTRAMUSCULAR; INTRAVENOUS; SUBCUTANEOUS at 03:10

## 2018-09-14 RX ADMIN — HYDROMORPHONE HYDROCHLORIDE 1 MILLIGRAM(S): 2 INJECTION INTRAMUSCULAR; INTRAVENOUS; SUBCUTANEOUS at 07:14

## 2018-09-14 RX ADMIN — Medication 2 CAPSULE(S): at 08:45

## 2018-09-14 RX ADMIN — Medication 1 PATCH: at 12:33

## 2018-09-14 RX ADMIN — HYDROMORPHONE HYDROCHLORIDE 1 MILLIGRAM(S): 2 INJECTION INTRAMUSCULAR; INTRAVENOUS; SUBCUTANEOUS at 19:50

## 2018-09-14 RX ADMIN — Medication 200 MILLIGRAM(S): at 05:29

## 2018-09-14 RX ADMIN — Medication 2 CAPSULE(S): at 17:38

## 2018-09-14 RX ADMIN — Medication 200 MILLIGRAM(S): at 17:36

## 2018-09-14 RX ADMIN — Medication 40 MILLIEQUIVALENT(S): at 15:17

## 2018-09-14 RX ADMIN — HYDROMORPHONE HYDROCHLORIDE 1 MILLIGRAM(S): 2 INJECTION INTRAMUSCULAR; INTRAVENOUS; SUBCUTANEOUS at 15:17

## 2018-09-14 RX ADMIN — HYDROMORPHONE HYDROCHLORIDE 1 MILLIGRAM(S): 2 INJECTION INTRAMUSCULAR; INTRAVENOUS; SUBCUTANEOUS at 19:20

## 2018-09-14 RX ADMIN — SODIUM CHLORIDE 100 MILLILITER(S): 9 INJECTION, SOLUTION INTRAVENOUS at 16:15

## 2018-09-14 RX ADMIN — HYDROMORPHONE HYDROCHLORIDE 1 MILLIGRAM(S): 2 INJECTION INTRAMUSCULAR; INTRAVENOUS; SUBCUTANEOUS at 15:47

## 2018-09-14 NOTE — PROGRESS NOTE ADULT - SUBJECTIVE AND OBJECTIVE BOX
SUBJECTIVE: Continues to have epigastric pain. No fever. Tolerating clears. WBC rising. Some diarrhea today.     MEDICATIONS  (STANDING):  amylase/lipase/protease  (CREON 36,000 Units) 2 Capsule(s) Oral three times a day with meals  ciprofloxacin   IVPB 400 milliGRAM(s) IV Intermittent every 12 hours  dextrose 5% + sodium chloride 0.45% with potassium chloride 20 mEq/L 1000 milliLiter(s) (50 mL/Hr) IV Continuous <Continuous>  enoxaparin Injectable 40 milliGRAM(s) SubCutaneous every 24 hours  influenza   Vaccine 0.5 milliLiter(s) IntraMuscular once  lactated ringers. 1000 milliLiter(s) (100 mL/Hr) IV Continuous <Continuous>  nicotine - 21 mG/24Hr(s) Patch 1 patch Transdermal daily  ondansetron Injectable 4 milliGRAM(s) IV Push once  sodium chloride 0.9% lock flush 3 milliLiter(s) IV Push every 8 hours    MEDICATIONS  (PRN):  ALPRAZolam 0.5 milliGRAM(s) Oral two times a day PRN anxiety  HYDROmorphone  Injectable 1 milliGRAM(s) IV Push every 4 hours PRN Severe Pain (7 - 10)  nicotine  Polacrilex Gum 4 milliGRAM(s) Oral every 6 hours PRN nicotine craving  nicotine  Polacrilex Lozenge 4 milliGRAM(s) Oral every 6 hours PRN nicotine craving  ondansetron Injectable 4 milliGRAM(s) IV Push every 6 hours PRN Nausea and/or Vomiting  oxyCODONE    5 mG/acetaminophen 325 mG 1 Tablet(s) Oral every 4 hours PRN Moderate Pain (4 - 6)      OBJECTIVE:  Vital Signs Last 24 Hrs  T(C): 36.9 (14 Sep 2018 17:05), Max: 37.3 (14 Sep 2018 01:08)  T(F): 98.4 (14 Sep 2018 17:05), Max: 99.1 (14 Sep 2018 01:08)  HR: 85 (14 Sep 2018 17:05) (77 - 88)  BP: 113/84 (14 Sep 2018 17:05) (109/72 - 132/91)  BP(mean): --  RR: 18 (14 Sep 2018 17:05) (18 - 18)  SpO2: 96% (14 Sep 2018 17:05) (95% - 99%)    PHYSICAL EXAM:  Constitutional: alert and oriented, in no acute distress  HEENT: no scleral icterus, no palpable lymph nodes  Cardiovascular: normal heart sounds  Respiratory: clear lungs  Gastrointestinal: soft, mild epigastric tenderness, nondistended, normal bowel sounds,   Extremities: No peripheral edema b/l     LABS:                        12.4   17.64 )-----------( 333      ( 14 Sep 2018 09:38 )             36.7     09-14    133<L>  |  96  |  4<L>  ----------------------------<  106<H>  3.4<L>   |  22  |  0.65    Ca    9.2      14 Sep 2018 07:16    TPro  6.1  /  Alb  3.5  /  TBili  0.5  /  DBili  x   /  AST  16  /  ALT  24  /  AlkPhos  54  09-14        LIVER FUNCTIONS - ( 14 Sep 2018 07:16 )  Alb: 3.5 g/dL / Pro: 6.1 g/dL / ALK PHOS: 54 U/L / ALT: 24 U/L / AST: 16 U/L / GGT: x             RADIOLOGY & ADDITIONAL TESTS:

## 2018-09-14 NOTE — PROGRESS NOTE ADULT - ASSESSMENT
· Assessment	  51F c hx SLE, chronic pancreatitis, ITP s/p splenectomy, current smoker, pw pancreatitis    Diarrhea:  GI f/up     Problem/Plan - 1:  · Abd pain sec to post procedure pancreatitis:    Clear liquids  GI f/up noted.  Pain control    Rt gluteal black spot:  Sx f/up.

## 2018-09-14 NOTE — CHART NOTE - NSCHARTNOTEFT_GEN_A_CORE
Preliminary CT A/P w/IV contrast from 9/14 showing proximal sigmoid diverticulitis. Patient on cipro IV. D/W Dr. Villarreal, added flagyl IV. VSS. F/U GI, F/U Surgx. Will continue to monitor. will endorse to AM team.     Kevin Alberto PA-C  Department of Medicine Preliminary CT A/P w/IV contrast from 9/14 showing proximal sigmoid diverticulitis. Patient on cipro IV. D/W Dr. Villarreal, added flagyl IV. VSS. F/U GI, F/U Surgx. Will continue to monitor. will endorse to AM team.     Kevin Alberto PA-C  Department of Medicine  - - - - - - - - -  - - - - - - - -   ADDENDUM: PER RN / Patient /Radiology resident Justin     Wrong band scanned for CT, and preliminary results displayed on sunrise are incorrect, patient has not gone done for CT A/P w/ IV contrast. Patient A&Ox4, confirmed with RN, Patient(a&Ox4) and radiology resident. Flagyl was not given, and will continue to monitor. Radiology resident to amend prelim. Dr. Villarreal aware.     Kevin Alberto PA-C   Department of Medicine Preliminary CT A/P w/IV contrast from 9/14 showing proximal sigmoid diverticulitis. Patient on cipro IV. D/W Dr. Villarreal, added flagyl IV. VSS. F/U GI, F/U Surgx. Will continue to monitor. will endorse to AM team.     Kevin Alberto PA-C  Department of Medicine  - - - - - - - - -  - - - - - - - -   ADDENDUM: PER RN / Patient /Radiology resident Justin     Wrong band scanned for CT, and preliminary results displayed on sunrise are incorrect, patient has not gone done for CT A/P w/ IV contrast. Patient A&Ox4, confirmed with RN, Patient and radiology resident. Flagyl was not given, and will continue to monitor. Radiology resident to amend prelim. Dr. Villarreal aware.     Kevin Alberto PA-C   Department of Medicine

## 2018-09-14 NOTE — PROGRESS NOTE ADULT - ASSESSMENT
chronic pancreatitis s/p lap zuleima pod 7 and EUS/ERCP  slowly resolving pancreatitis  f/u labs  cont supportive care/diet  per med/gi  f/u rpt CT for elev wbc  if no acute acute gen sx issues - f/u as outpt 1-2 weeks

## 2018-09-14 NOTE — PROGRESS NOTE ADULT - SUBJECTIVE AND OBJECTIVE BOX
Patient is a 53y old  Female who presents with a chief complaint of Abdominal pain and nausea (13 Sep 2018 20:58)      SUBJECTIVE / OVERNIGHT EVENTS:   Feels better.  Denies CP/SOB/Palpitation/HA.    MEDICATIONS  (STANDING):  amylase/lipase/protease  (CREON 36,000 Units) 2 Capsule(s) Oral three times a day with meals  ciprofloxacin   IVPB 400 milliGRAM(s) IV Intermittent every 12 hours  dextrose 5% + sodium chloride 0.45% with potassium chloride 20 mEq/L 1000 milliLiter(s) (50 mL/Hr) IV Continuous <Continuous>  enoxaparin Injectable 40 milliGRAM(s) SubCutaneous every 24 hours  influenza   Vaccine 0.5 milliLiter(s) IntraMuscular once  lactated ringers. 1000 milliLiter(s) (100 mL/Hr) IV Continuous <Continuous>  nicotine - 21 mG/24Hr(s) Patch 1 patch Transdermal daily  ondansetron Injectable 4 milliGRAM(s) IV Push once  sodium chloride 0.9% lock flush 3 milliLiter(s) IV Push every 8 hours    MEDICATIONS  (PRN):  ALPRAZolam 0.5 milliGRAM(s) Oral two times a day PRN anxiety  HYDROmorphone  Injectable 1 milliGRAM(s) IV Push every 4 hours PRN Severe Pain (7 - 10)  nicotine  Polacrilex Gum 4 milliGRAM(s) Oral every 6 hours PRN nicotine craving  nicotine  Polacrilex Lozenge 4 milliGRAM(s) Oral every 6 hours PRN nicotine craving  ondansetron Injectable 4 milliGRAM(s) IV Push every 6 hours PRN Nausea and/or Vomiting  oxyCODONE    5 mG/acetaminophen 325 mG 1 Tablet(s) Oral every 4 hours PRN Moderate Pain (4 - 6)        CAPILLARY BLOOD GLUCOSE        I&O's Summary    13 Sep 2018 07:01  -  14 Sep 2018 07:00  --------------------------------------------------------  IN: 2820 mL / OUT: 0 mL / NET: 2820 mL    14 Sep 2018 07:01  -  14 Sep 2018 16:30  --------------------------------------------------------  IN: 480 mL / OUT: 0 mL / NET: 480 mL        PHYSICAL EXAM:  GENERAL: NAD, well-developed  HEAD:  Atraumatic, Normocephalic  NECK: Supple, No JVD  CHEST/LUNG: Clear to auscultation bilaterally; No wheezing.  HEART: Regular rate and rhythm; No murmurs, rubs, or gallops  ABDOMEN: Soft, Nontender, Nondistended; Bowel sounds present  EXTREMITIES:   No clubbing, cyanosis, or edema  NEUROLOGY: AAO X 3  SKIN: No rashes    LABS:                        12.4   17.64 )-----------( 333      ( 14 Sep 2018 09:38 )             36.7     09-14    133<L>  |  96  |  4<L>  ----------------------------<  106<H>  3.4<L>   |  22  |  0.65    Ca    9.2      14 Sep 2018 07:16    TPro  6.1  /  Alb  3.5  /  TBili  0.5  /  DBili  x   /  AST  16  /  ALT  24  /  AlkPhos  54  09-14            CAPILLARY BLOOD GLUCOSE                    RADIOLOGY & ADDITIONAL TESTS:    Imaging Personally Reviewed:    Consultant(s) Notes Reviewed:      Care Discussed with Consultants/Other Providers:

## 2018-09-14 NOTE — PROGRESS NOTE ADULT - ASSESSMENT
A/P: Decreasing lipase but rising WBC count. Continue antibiotics. Will obtain abdominal and pelvic CT for further evaluation. Patient is in agreement.

## 2018-09-14 NOTE — PROGRESS NOTE ADULT - SUBJECTIVE AND OBJECTIVE BOX
INTERVAL HPI/OVERNIGHT EVENTS:    STATUS POST:      pt reports pain improved, no n/v today - tolerating diet    Path c/w chronic cholecystitis    POST OPERATIVE DAY #:     MEDICATIONS  (STANDING):  amylase/lipase/protease  (CREON 36,000 Units) 2 Capsule(s) Oral three times a day with meals  ciprofloxacin   IVPB 400 milliGRAM(s) IV Intermittent every 12 hours  dextrose 5% + sodium chloride 0.45% with potassium chloride 20 mEq/L 1000 milliLiter(s) (50 mL/Hr) IV Continuous <Continuous>  enoxaparin Injectable 40 milliGRAM(s) SubCutaneous every 24 hours  influenza   Vaccine 0.5 milliLiter(s) IntraMuscular once  lactated ringers. 1000 milliLiter(s) (100 mL/Hr) IV Continuous <Continuous>  nicotine - 21 mG/24Hr(s) Patch 1 patch Transdermal daily  ondansetron Injectable 4 milliGRAM(s) IV Push once  sodium chloride 0.9% lock flush 3 milliLiter(s) IV Push every 8 hours    MEDICATIONS  (PRN):  ALPRAZolam 0.5 milliGRAM(s) Oral two times a day PRN anxiety  HYDROmorphone  Injectable 1 milliGRAM(s) IV Push every 4 hours PRN Severe Pain (7 - 10)  nicotine  Polacrilex Gum 4 milliGRAM(s) Oral every 6 hours PRN nicotine craving  nicotine  Polacrilex Lozenge 4 milliGRAM(s) Oral every 6 hours PRN nicotine craving  ondansetron Injectable 4 milliGRAM(s) IV Push every 6 hours PRN Nausea and/or Vomiting  oxyCODONE    5 mG/acetaminophen 325 mG 1 Tablet(s) Oral every 4 hours PRN Moderate Pain (4 - 6)      Vital Signs Last 24 Hrs  T(C): 36.9 (14 Sep 2018 17:05), Max: 37.3 (14 Sep 2018 01:08)  T(F): 98.4 (14 Sep 2018 17:05), Max: 99.1 (14 Sep 2018 01:08)  HR: 85 (14 Sep 2018 17:05) (77 - 88)  BP: 113/84 (14 Sep 2018 17:05) (109/72 - 132/91)  BP(mean): --  RR: 18 (14 Sep 2018 17:05) (18 - 18)  SpO2: 96% (14 Sep 2018 17:05) (95% - 97%)  I&O's Detail    13 Sep 2018 07:01  -  14 Sep 2018 07:00  --------------------------------------------------------  IN:    lactated ringers.: 2400 mL    Oral Fluid: 220 mL    Solution: 200 mL  Total IN: 2820 mL    OUT:  Total OUT: 0 mL    Total NET: 2820 mL      14 Sep 2018 07:01  -  14 Sep 2018 18:59  --------------------------------------------------------  IN:    Oral Fluid: 480 mL  Total IN: 480 mL    OUT:  Total OUT: 0 mL    Total NET: 480 mL          REVIEW OF SYSTEMS:  CONSTITUTIONAL: No weakness, fevers or chills  EYES/ENT: No visual changes;  No vertigo or throat pain   NECK: No pain or stiffness  RESPIRATORY: No cough, wheezing, hemoptysis; No shortness of breath  CARDIOVASCULAR: No chest pain or palpitations  GASTROINTESTINAL: No abdominal or epigastric pain. No nausea, vomiting, or hematemesis; No diarrhea or constipation. No melena or hematochezia.  GENITOURINARY: No dysuria, frequency or hematuria  NEUROLOGICAL: No numbness or weakness  SKIN: No itching, burning, rashes, or lesions   All other review of systems is negative unless indicated above.    Physical Exam:  General: WN/WD NAD  Neurology: A&Ox3, nonfocal, MAY x 4  Respiratory: CTA B/L  CV: RRR, S1S2, no murmurs, rubs or gallops  Abdominal: Soft, min tenderness, ND +BS, Last BM wounds c/d/i X 4  Extremities: No edema, + peripheral pulses        LABS:                        12.4   17.64 )-----------( 333      ( 14 Sep 2018 09:38 )             36.7     09-14    133<L>  |  96  |  4<L>  ----------------------------<  106<H>  3.4<L>   |  22  |  0.65    Ca    9.2      14 Sep 2018 07:16    TPro  6.1  /  Alb  3.5  /  TBili  0.5  /  DBili  x   /  AST  16  /  ALT  24  /  AlkPhos  54  09-14          RADIOLOGY & ADDITIONAL STUDIES:

## 2018-09-15 LAB
ALBUMIN SERPL ELPH-MCNC: 3.5 G/DL — SIGNIFICANT CHANGE UP (ref 3.3–5)
ALP SERPL-CCNC: 61 U/L — SIGNIFICANT CHANGE UP (ref 40–120)
ALT FLD-CCNC: 20 U/L — SIGNIFICANT CHANGE UP (ref 10–45)
AMYLASE P1 CFR SERPL: 36 U/L — SIGNIFICANT CHANGE UP (ref 25–125)
ANION GAP SERPL CALC-SCNC: 15 MMOL/L — SIGNIFICANT CHANGE UP (ref 5–17)
AST SERPL-CCNC: 19 U/L — SIGNIFICANT CHANGE UP (ref 10–40)
BILIRUB SERPL-MCNC: 0.5 MG/DL — SIGNIFICANT CHANGE UP (ref 0.2–1.2)
BUN SERPL-MCNC: <4 MG/DL — LOW (ref 7–23)
CALCIUM SERPL-MCNC: 9.4 MG/DL — SIGNIFICANT CHANGE UP (ref 8.4–10.5)
CHLORIDE SERPL-SCNC: 97 MMOL/L — SIGNIFICANT CHANGE UP (ref 96–108)
CO2 SERPL-SCNC: 23 MMOL/L — SIGNIFICANT CHANGE UP (ref 22–31)
CREAT SERPL-MCNC: 0.65 MG/DL — SIGNIFICANT CHANGE UP (ref 0.5–1.3)
GLUCOSE SERPL-MCNC: 89 MG/DL — SIGNIFICANT CHANGE UP (ref 70–99)
HCT VFR BLD CALC: 41.3 % — SIGNIFICANT CHANGE UP (ref 34.5–45)
HGB BLD-MCNC: 14 G/DL — SIGNIFICANT CHANGE UP (ref 11.5–15.5)
LIDOCAIN IGE QN: 44 U/L — SIGNIFICANT CHANGE UP (ref 7–60)
MAGNESIUM SERPL-MCNC: 1.7 MG/DL — SIGNIFICANT CHANGE UP (ref 1.6–2.6)
MCHC RBC-ENTMCNC: 33 PG — SIGNIFICANT CHANGE UP (ref 27–34)
MCHC RBC-ENTMCNC: 33.8 GM/DL — SIGNIFICANT CHANGE UP (ref 32–36)
MCV RBC AUTO: 97.7 FL — SIGNIFICANT CHANGE UP (ref 80–100)
PLATELET # BLD AUTO: 343 K/UL — SIGNIFICANT CHANGE UP (ref 150–400)
POTASSIUM SERPL-MCNC: 4.3 MMOL/L — SIGNIFICANT CHANGE UP (ref 3.5–5.3)
POTASSIUM SERPL-SCNC: 4.3 MMOL/L — SIGNIFICANT CHANGE UP (ref 3.5–5.3)
PROT SERPL-MCNC: 6.8 G/DL — SIGNIFICANT CHANGE UP (ref 6–8.3)
RBC # BLD: 4.23 M/UL — SIGNIFICANT CHANGE UP (ref 3.8–5.2)
RBC # FLD: 12.2 % — SIGNIFICANT CHANGE UP (ref 10.3–14.5)
SODIUM SERPL-SCNC: 135 MMOL/L — SIGNIFICANT CHANGE UP (ref 135–145)
WBC # BLD: 14.4 K/UL — HIGH (ref 3.8–10.5)
WBC # FLD AUTO: 14.4 K/UL — HIGH (ref 3.8–10.5)

## 2018-09-15 RX ORDER — SODIUM CHLORIDE 9 MG/ML
1000 INJECTION, SOLUTION INTRAVENOUS
Qty: 0 | Refills: 0 | Status: DISCONTINUED | OUTPATIENT
Start: 2018-09-15 | End: 2018-09-18

## 2018-09-15 RX ADMIN — Medication 1 PATCH: at 11:49

## 2018-09-15 RX ADMIN — Medication 2 CAPSULE(S): at 09:10

## 2018-09-15 RX ADMIN — Medication 2 CAPSULE(S): at 11:49

## 2018-09-15 RX ADMIN — Medication 2 CAPSULE(S): at 17:14

## 2018-09-15 RX ADMIN — SODIUM CHLORIDE 3 MILLILITER(S): 9 INJECTION INTRAMUSCULAR; INTRAVENOUS; SUBCUTANEOUS at 05:15

## 2018-09-15 RX ADMIN — SODIUM CHLORIDE 100 MILLILITER(S): 9 INJECTION, SOLUTION INTRAVENOUS at 09:11

## 2018-09-15 RX ADMIN — Medication 200 MILLIGRAM(S): at 05:19

## 2018-09-15 RX ADMIN — HYDROMORPHONE HYDROCHLORIDE 1 MILLIGRAM(S): 2 INJECTION INTRAMUSCULAR; INTRAVENOUS; SUBCUTANEOUS at 12:19

## 2018-09-15 RX ADMIN — Medication 200 MILLIGRAM(S): at 17:13

## 2018-09-15 RX ADMIN — HYDROMORPHONE HYDROCHLORIDE 1 MILLIGRAM(S): 2 INJECTION INTRAMUSCULAR; INTRAVENOUS; SUBCUTANEOUS at 03:51

## 2018-09-15 RX ADMIN — HYDROMORPHONE HYDROCHLORIDE 1 MILLIGRAM(S): 2 INJECTION INTRAMUSCULAR; INTRAVENOUS; SUBCUTANEOUS at 23:47

## 2018-09-15 RX ADMIN — SODIUM CHLORIDE 3 MILLILITER(S): 9 INJECTION INTRAMUSCULAR; INTRAVENOUS; SUBCUTANEOUS at 21:19

## 2018-09-15 RX ADMIN — HYDROMORPHONE HYDROCHLORIDE 1 MILLIGRAM(S): 2 INJECTION INTRAMUSCULAR; INTRAVENOUS; SUBCUTANEOUS at 08:21

## 2018-09-15 RX ADMIN — ENOXAPARIN SODIUM 40 MILLIGRAM(S): 100 INJECTION SUBCUTANEOUS at 23:47

## 2018-09-15 RX ADMIN — HYDROMORPHONE HYDROCHLORIDE 1 MILLIGRAM(S): 2 INJECTION INTRAMUSCULAR; INTRAVENOUS; SUBCUTANEOUS at 11:49

## 2018-09-15 RX ADMIN — HYDROMORPHONE HYDROCHLORIDE 1 MILLIGRAM(S): 2 INJECTION INTRAMUSCULAR; INTRAVENOUS; SUBCUTANEOUS at 16:23

## 2018-09-15 RX ADMIN — HYDROMORPHONE HYDROCHLORIDE 1 MILLIGRAM(S): 2 INJECTION INTRAMUSCULAR; INTRAVENOUS; SUBCUTANEOUS at 19:50

## 2018-09-15 RX ADMIN — HYDROMORPHONE HYDROCHLORIDE 1 MILLIGRAM(S): 2 INJECTION INTRAMUSCULAR; INTRAVENOUS; SUBCUTANEOUS at 20:20

## 2018-09-15 RX ADMIN — HYDROMORPHONE HYDROCHLORIDE 1 MILLIGRAM(S): 2 INJECTION INTRAMUSCULAR; INTRAVENOUS; SUBCUTANEOUS at 07:41

## 2018-09-15 RX ADMIN — HYDROMORPHONE HYDROCHLORIDE 1 MILLIGRAM(S): 2 INJECTION INTRAMUSCULAR; INTRAVENOUS; SUBCUTANEOUS at 15:54

## 2018-09-15 RX ADMIN — Medication 1 PATCH: at 11:56

## 2018-09-15 RX ADMIN — HYDROMORPHONE HYDROCHLORIDE 1 MILLIGRAM(S): 2 INJECTION INTRAMUSCULAR; INTRAVENOUS; SUBCUTANEOUS at 03:21

## 2018-09-15 RX ADMIN — SODIUM CHLORIDE 3 MILLILITER(S): 9 INJECTION INTRAMUSCULAR; INTRAVENOUS; SUBCUTANEOUS at 12:38

## 2018-09-15 NOTE — PROGRESS NOTE ADULT - SUBJECTIVE AND OBJECTIVE BOX
INTERVAL HPI/OVERNIGHT EVENTS: Pt still c/o epigastric pain    STATUS POST:  lap cholecystectomy      MEDICATIONS  (STANDING):  amylase/lipase/protease  (CREON 36,000 Units) 2 Capsule(s) Oral three times a day with meals  ciprofloxacin   IVPB 400 milliGRAM(s) IV Intermittent every 12 hours  dextrose 5% + sodium chloride 0.45% with potassium chloride 20 mEq/L 1000 milliLiter(s) (50 mL/Hr) IV Continuous <Continuous>  enoxaparin Injectable 40 milliGRAM(s) SubCutaneous every 24 hours  influenza   Vaccine 0.5 milliLiter(s) IntraMuscular once  lactated ringers. 1000 milliLiter(s) (100 mL/Hr) IV Continuous <Continuous>  nicotine - 21 mG/24Hr(s) Patch 1 patch Transdermal daily  ondansetron Injectable 4 milliGRAM(s) IV Push once  sodium chloride 0.9% lock flush 3 milliLiter(s) IV Push every 8 hours    MEDICATIONS  (PRN):  ALPRAZolam 0.5 milliGRAM(s) Oral two times a day PRN anxiety  HYDROmorphone  Injectable 1 milliGRAM(s) IV Push every 4 hours PRN Severe Pain (7 - 10)  nicotine  Polacrilex Gum 4 milliGRAM(s) Oral every 6 hours PRN nicotine craving  nicotine  Polacrilex Lozenge 4 milliGRAM(s) Oral every 6 hours PRN nicotine craving  ondansetron Injectable 4 milliGRAM(s) IV Push every 6 hours PRN Nausea and/or Vomiting      Vital Signs Last 24 Hrs  T(C): 36.8 (15 Sep 2018 10:17), Max: 37.1 (14 Sep 2018 14:09)  T(F): 98.2 (15 Sep 2018 10:17), Max: 98.7 (14 Sep 2018 14:09)  HR: 80 (15 Sep 2018 10:17) (75 - 88)  BP: 112/74 (15 Sep 2018 10:17) (103/70 - 139/84)  BP(mean): --  RR: 18 (15 Sep 2018 10:17) (18 - 18)  SpO2: 96% (15 Sep 2018 10:17) (95% - 98%)  I&O's Detail    14 Sep 2018 07:01  -  15 Sep 2018 07:00  --------------------------------------------------------  IN:    lactated ringers.: 1200 mL    Oral Fluid: 980 mL    Solution: 200 mL  Total IN: 2380 mL    OUT:    Voided: 1 mL  Total OUT: 1 mL    Total NET: 2379 mL      15 Sep 2018 07:01  -  15 Sep 2018 10:47  --------------------------------------------------------  IN:    Oral Fluid: 80 mL  Total IN: 80 mL    OUT:    Voided: 1 mL  Total OUT: 1 mL    Total NET: 79 mL      Abdominal: Soft, ND, [id number] tender to epigastric palpation      LABS:                        14.0   14.4  )-----------( 343      ( 15 Sep 2018 08:26 )             41.3     09-15    135  |  97  |  <4<L>  ----------------------------<  89  4.3   |  23  |  0.65    Ca    9.4      15 Sep 2018 07:43  Mg     1.7     09-15    TPro  6.8  /  Alb  3.5  /  TBili  0.5  /  DBili  x   /  AST  19  /  ALT  20  /  AlkPhos  61  09-15          RADIOLOGY & ADDITIONAL STUDIES:

## 2018-09-15 NOTE — CHART NOTE - NSCHARTNOTEFT_GEN_A_CORE
[id number]  RIKY DOUGHERTY    Preliminary CT A/P w/IV contrast from 9/14 revealed mild pancreatitis . Patient on cipro IV by GI team VSS. GI and Sx following . Will continue to monitor and endorse to Attending. Wrong band scanned for CT, and preliminary results displayed on sunrise are incorrect,  Spoke to Radiology, Marizol- attending Dr Love to amend prelim. Dr. Villarreal aware.    Zaira Mares PA-C   Dept of Medicine   [id number]

## 2018-09-15 NOTE — PROGRESS NOTE ADULT - SUBJECTIVE AND OBJECTIVE BOX
Patient is a 53y old  Female who presents with a chief complaint of Abdominal pain and nausea (15 Sep 2018 18:16)      SUBJECTIVE / OVERNIGHT EVENTS:   Feels better.  Denies CP/SOB/Palpitation/HA.    MEDICATIONS  (STANDING):  amylase/lipase/protease  (CREON 36,000 Units) 2 Capsule(s) Oral three times a day with meals  ciprofloxacin   IVPB 400 milliGRAM(s) IV Intermittent every 12 hours  enoxaparin Injectable 40 milliGRAM(s) SubCutaneous every 24 hours  influenza   Vaccine 0.5 milliLiter(s) IntraMuscular once  lactated ringers. 1000 milliLiter(s) (50 mL/Hr) IV Continuous <Continuous>  nicotine - 21 mG/24Hr(s) Patch 1 patch Transdermal daily  ondansetron Injectable 4 milliGRAM(s) IV Push once  sodium chloride 0.9% lock flush 3 milliLiter(s) IV Push every 8 hours    MEDICATIONS  (PRN):  ALPRAZolam 0.5 milliGRAM(s) Oral two times a day PRN anxiety  HYDROmorphone  Injectable 1 milliGRAM(s) IV Push every 4 hours PRN Severe Pain (7 - 10)  nicotine  Polacrilex Gum 4 milliGRAM(s) Oral every 6 hours PRN nicotine craving  nicotine  Polacrilex Lozenge 4 milliGRAM(s) Oral every 6 hours PRN nicotine craving  ondansetron Injectable 4 milliGRAM(s) IV Push every 6 hours PRN Nausea and/or Vomiting        CAPILLARY BLOOD GLUCOSE        I&O's Summary    14 Sep 2018 07:01  -  15 Sep 2018 07:00  --------------------------------------------------------  IN: 2380 mL / OUT: 1 mL / NET: 2379 mL    15 Sep 2018 07:01  -  15 Sep 2018 23:42  --------------------------------------------------------  IN: 1640 mL / OUT: 400 mL / NET: 1240 mL        PHYSICAL EXAM:  GENERAL: NAD, well-developed  HEAD:  Atraumatic, Normocephalic  NECK: Supple, No JVD  CHEST/LUNG: Clear to auscultation bilaterally; No wheezing.  HEART: Regular rate and rhythm; No murmurs, rubs, or gallops  ABDOMEN: Soft, Nontender, Nondistended; Bowel sounds present  EXTREMITIES:   No clubbing, cyanosis, or edema  NEUROLOGY: AAO X 3  SKIN: No rashes    LABS:                        14.0   14.4  )-----------( 343      ( 15 Sep 2018 08:26 )             41.3     09-15    135  |  97  |  <4<L>  ----------------------------<  89  4.3   |  23  |  0.65    Ca    9.4      15 Sep 2018 07:43  Mg     1.7     09-15    TPro  6.8  /  Alb  3.5  /  TBili  0.5  /  DBili  x   /  AST  19  /  ALT  20  /  AlkPhos  61  09-15            CAPILLARY BLOOD GLUCOSE                    RADIOLOGY & ADDITIONAL TESTS:    Imaging Personally Reviewed:    Consultant(s) Notes Reviewed:      Care Discussed with Consultants/Other Providers:

## 2018-09-15 NOTE — PROGRESS NOTE ADULT - ASSESSMENT
· Assessment	  51F c hx SLE, chronic pancreatitis, ITP s/p splenectomy, current smoker, pw pancreatitis    Diarrhea:  GI f/up     Problem/Plan - 1:  · Abd pain sec to post procedure pancreatitis:    Advanced diet as tolerates  GI f/up noted.  Pain control    Rt gluteal black spot:  Derm eval

## 2018-09-15 NOTE — PROGRESS NOTE ADULT - ASSESSMENT
A/P: Pancreas divisum with pancreatitis. Improving clinically. Continue IV cipro for one additional day, then can be discontinued. Monitor CBC tomorrow. Decrease IV fluid. If she can tolerate po solid food and pain is controlled with po meds then I am hopeful that she may be able to go home in the next few days. Still waiting for dermatology to assess rash on the buttock. Rash appears better. Discussed with medicine PA.

## 2018-09-15 NOTE — PROGRESS NOTE ADULT - ASSESSMENT
52y/o with hx of pancreas divisum, ws/p lap cholecystectomy, s/p ERCP with pancreatic stent. CT scan to w/u elevated wbc c/w mild sigmoid diverticulitis (prelim)    - cont cipro  - would add flagyl  - advance diet as tolerated  - f/u official CT report

## 2018-09-15 NOTE — PROGRESS NOTE ADULT - SUBJECTIVE AND OBJECTIVE BOX
SUBJECTIVE: Feels a bit better.     MEDICATIONS  (STANDING):  amylase/lipase/protease  (CREON 36,000 Units) 2 Capsule(s) Oral three times a day with meals  ciprofloxacin   IVPB 400 milliGRAM(s) IV Intermittent every 12 hours  dextrose 5% + sodium chloride 0.45% with potassium chloride 20 mEq/L 1000 milliLiter(s) (50 mL/Hr) IV Continuous <Continuous>  enoxaparin Injectable 40 milliGRAM(s) SubCutaneous every 24 hours  influenza   Vaccine 0.5 milliLiter(s) IntraMuscular once  lactated ringers. 1000 milliLiter(s) (100 mL/Hr) IV Continuous <Continuous>  nicotine - 21 mG/24Hr(s) Patch 1 patch Transdermal daily  ondansetron Injectable 4 milliGRAM(s) IV Push once  sodium chloride 0.9% lock flush 3 milliLiter(s) IV Push every 8 hours    MEDICATIONS  (PRN):  ALPRAZolam 0.5 milliGRAM(s) Oral two times a day PRN anxiety  HYDROmorphone  Injectable 1 milliGRAM(s) IV Push every 4 hours PRN Severe Pain (7 - 10)  nicotine  Polacrilex Gum 4 milliGRAM(s) Oral every 6 hours PRN nicotine craving  nicotine  Polacrilex Lozenge 4 milliGRAM(s) Oral every 6 hours PRN nicotine craving  ondansetron Injectable 4 milliGRAM(s) IV Push every 6 hours PRN Nausea and/or Vomiting      OBJECTIVE:  Vital Signs Last 24 Hrs  T(C): 36.9 (15 Sep 2018 14:04), Max: 36.9 (15 Sep 2018 14:04)  T(F): 98.5 (15 Sep 2018 14:04), Max: 98.5 (15 Sep 2018 14:04)  HR: 85 (15 Sep 2018 14:04) (75 - 85)  BP: 106/77 (15 Sep 2018 14:04) (103/70 - 139/84)  BP(mean): --  RR: 18 (15 Sep 2018 14:04) (18 - 18)  SpO2: 95% (15 Sep 2018 14:04) (95% - 98%)    PHYSICAL EXAM:  Constitutional: alert and oriented, in no acute distress  HEENT: no scleral icterus, no palpable lymph nodes  Cardiovascular: normal heart sounds  Respiratory: clear lungs  Gastrointestinal: soft, minimal epigastric tenderness, nondistended, normal bowel sounds,   Extremities: No peripheral edema b/l     LABS:                        14.0   14.4  )-----------( 343      ( 15 Sep 2018 08:26 )             41.3     09-15    135  |  97  |  <4<L>  ----------------------------<  89  4.3   |  23  |  0.65    Ca    9.4      15 Sep 2018 07:43  Mg     1.7     09-15    TPro  6.8  /  Alb  3.5  /  TBili  0.5  /  DBili  x   /  AST  19  /  ALT  20  /  AlkPhos  61  09-15        LIVER FUNCTIONS - ( 15 Sep 2018 07:43 )  Alb: 3.5 g/dL / Pro: 6.8 g/dL / ALK PHOS: 61 U/L / ALT: 20 U/L / AST: 19 U/L / GGT: x             RADIOLOGY & ADDITIONAL TESTS:    CT REVIEWED: PATIENT'S CT DOES NOT SHOW UP IN HER [facility] PACS FOLDER. I REVIEWED IT IN RADIOLOGY. HER SCAN IS ELECTRONICALLY STORED IN A DIFFERENT PATIENT'S FOLDER. RADIOLOGY DEPARTMENT KNOWS AND ARE WAITING FOR IT TO CORRECT IT.   UPON MY REVIEW OF THE CT SCAN THE PANCREATIC DUCT STENT IS IN GOOD POSITION ACROSS THE MINOR PAPILLA. CLIPS ARE SEEN IN THE REGION OF THE MAJOR PAPILLA. SMALL AMOUNT OF ANDREW-PANCREATIC FLUID WITH ONE DROPLET OF AIR. NO DIVERTICULITIS.

## 2018-09-16 LAB
HCT VFR BLD CALC: 38.8 % — SIGNIFICANT CHANGE UP (ref 34.5–45)
HGB BLD-MCNC: 13.6 G/DL — SIGNIFICANT CHANGE UP (ref 11.5–15.5)
MCHC RBC-ENTMCNC: 32.9 PG — SIGNIFICANT CHANGE UP (ref 27–34)
MCHC RBC-ENTMCNC: 35.1 GM/DL — SIGNIFICANT CHANGE UP (ref 32–36)
MCV RBC AUTO: 93.7 FL — SIGNIFICANT CHANGE UP (ref 80–100)
PLATELET # BLD AUTO: 393 K/UL — SIGNIFICANT CHANGE UP (ref 150–400)
RBC # BLD: 4.14 M/UL — SIGNIFICANT CHANGE UP (ref 3.8–5.2)
RBC # FLD: 14.2 % — SIGNIFICANT CHANGE UP (ref 10.3–14.5)
WBC # BLD: 12.2 K/UL — HIGH (ref 3.8–10.5)
WBC # FLD AUTO: 12.2 K/UL — HIGH (ref 3.8–10.5)

## 2018-09-16 RX ORDER — METRONIDAZOLE 500 MG
500 TABLET ORAL ONCE
Qty: 0 | Refills: 0 | Status: DISCONTINUED | OUTPATIENT
Start: 2018-09-16 | End: 2018-09-16

## 2018-09-16 RX ORDER — METRONIDAZOLE 500 MG
500 TABLET ORAL EVERY 8 HOURS
Qty: 0 | Refills: 0 | Status: DISCONTINUED | OUTPATIENT
Start: 2018-09-16 | End: 2018-09-16

## 2018-09-16 RX ORDER — METRONIDAZOLE 500 MG
TABLET ORAL
Qty: 0 | Refills: 0 | Status: DISCONTINUED | OUTPATIENT
Start: 2018-09-16 | End: 2018-09-16

## 2018-09-16 RX ADMIN — HYDROMORPHONE HYDROCHLORIDE 1 MILLIGRAM(S): 2 INJECTION INTRAMUSCULAR; INTRAVENOUS; SUBCUTANEOUS at 00:15

## 2018-09-16 RX ADMIN — HYDROMORPHONE HYDROCHLORIDE 1 MILLIGRAM(S): 2 INJECTION INTRAMUSCULAR; INTRAVENOUS; SUBCUTANEOUS at 14:30

## 2018-09-16 RX ADMIN — Medication 2 CAPSULE(S): at 17:17

## 2018-09-16 RX ADMIN — Medication 1 PATCH: at 11:02

## 2018-09-16 RX ADMIN — ENOXAPARIN SODIUM 40 MILLIGRAM(S): 100 INJECTION SUBCUTANEOUS at 21:16

## 2018-09-16 RX ADMIN — HYDROMORPHONE HYDROCHLORIDE 1 MILLIGRAM(S): 2 INJECTION INTRAMUSCULAR; INTRAVENOUS; SUBCUTANEOUS at 04:56

## 2018-09-16 RX ADMIN — HYDROMORPHONE HYDROCHLORIDE 1 MILLIGRAM(S): 2 INJECTION INTRAMUSCULAR; INTRAVENOUS; SUBCUTANEOUS at 22:37

## 2018-09-16 RX ADMIN — HYDROMORPHONE HYDROCHLORIDE 1 MILLIGRAM(S): 2 INJECTION INTRAMUSCULAR; INTRAVENOUS; SUBCUTANEOUS at 18:33

## 2018-09-16 RX ADMIN — Medication 2 CAPSULE(S): at 09:37

## 2018-09-16 RX ADMIN — HYDROMORPHONE HYDROCHLORIDE 1 MILLIGRAM(S): 2 INJECTION INTRAMUSCULAR; INTRAVENOUS; SUBCUTANEOUS at 09:45

## 2018-09-16 RX ADMIN — SODIUM CHLORIDE 50 MILLILITER(S): 9 INJECTION, SOLUTION INTRAVENOUS at 04:59

## 2018-09-16 RX ADMIN — HYDROMORPHONE HYDROCHLORIDE 1 MILLIGRAM(S): 2 INJECTION INTRAMUSCULAR; INTRAVENOUS; SUBCUTANEOUS at 05:20

## 2018-09-16 RX ADMIN — HYDROMORPHONE HYDROCHLORIDE 1 MILLIGRAM(S): 2 INJECTION INTRAMUSCULAR; INTRAVENOUS; SUBCUTANEOUS at 14:12

## 2018-09-16 RX ADMIN — SODIUM CHLORIDE 3 MILLILITER(S): 9 INJECTION INTRAMUSCULAR; INTRAVENOUS; SUBCUTANEOUS at 13:11

## 2018-09-16 RX ADMIN — HYDROMORPHONE HYDROCHLORIDE 1 MILLIGRAM(S): 2 INJECTION INTRAMUSCULAR; INTRAVENOUS; SUBCUTANEOUS at 19:05

## 2018-09-16 RX ADMIN — Medication 2 CAPSULE(S): at 13:09

## 2018-09-16 RX ADMIN — Medication 200 MILLIGRAM(S): at 05:00

## 2018-09-16 RX ADMIN — HYDROMORPHONE HYDROCHLORIDE 1 MILLIGRAM(S): 2 INJECTION INTRAMUSCULAR; INTRAVENOUS; SUBCUTANEOUS at 10:20

## 2018-09-16 RX ADMIN — SODIUM CHLORIDE 3 MILLILITER(S): 9 INJECTION INTRAMUSCULAR; INTRAVENOUS; SUBCUTANEOUS at 05:00

## 2018-09-16 RX ADMIN — Medication 200 MILLIGRAM(S): at 17:17

## 2018-09-16 RX ADMIN — SODIUM CHLORIDE 3 MILLILITER(S): 9 INJECTION INTRAMUSCULAR; INTRAVENOUS; SUBCUTANEOUS at 21:17

## 2018-09-16 RX ADMIN — Medication 1 PATCH: at 11:01

## 2018-09-16 RX ADMIN — HYDROMORPHONE HYDROCHLORIDE 1 MILLIGRAM(S): 2 INJECTION INTRAMUSCULAR; INTRAVENOUS; SUBCUTANEOUS at 23:05

## 2018-09-16 NOTE — PROGRESS NOTE ADULT - SUBJECTIVE AND OBJECTIVE BOX
Patient is a 53y old  Female who presents with a chief complaint of Abdominal pain and nausea (16 Sep 2018 08:30)      SUBJECTIVE / OVERNIGHT EVENTS:   Feels better.  Denies CP/SOB/Palpitation/HA.    MEDICATIONS  (STANDING):  amylase/lipase/protease  (CREON 36,000 Units) 2 Capsule(s) Oral three times a day with meals  ciprofloxacin   IVPB 400 milliGRAM(s) IV Intermittent every 12 hours  enoxaparin Injectable 40 milliGRAM(s) SubCutaneous every 24 hours  influenza   Vaccine 0.5 milliLiter(s) IntraMuscular once  lactated ringers. 1000 milliLiter(s) (50 mL/Hr) IV Continuous <Continuous>  nicotine - 21 mG/24Hr(s) Patch 1 patch Transdermal daily  ondansetron Injectable 4 milliGRAM(s) IV Push once  sodium chloride 0.9% lock flush 3 milliLiter(s) IV Push every 8 hours    MEDICATIONS  (PRN):  ALPRAZolam 0.5 milliGRAM(s) Oral two times a day PRN anxiety  HYDROmorphone  Injectable 1 milliGRAM(s) IV Push every 4 hours PRN Severe Pain (7 - 10)  nicotine  Polacrilex Gum 4 milliGRAM(s) Oral every 6 hours PRN nicotine craving  nicotine  Polacrilex Lozenge 4 milliGRAM(s) Oral every 6 hours PRN nicotine craving  ondansetron Injectable 4 milliGRAM(s) IV Push every 6 hours PRN Nausea and/or Vomiting        CAPILLARY BLOOD GLUCOSE        I&O's Summary    15 Sep 2018 07:01  -  16 Sep 2018 07:00  --------------------------------------------------------  IN: 2440 mL / OUT: 400 mL / NET: 2040 mL    16 Sep 2018 07:01  -  16 Sep 2018 23:02  --------------------------------------------------------  IN: 680 mL / OUT: 1000 mL / NET: -320 mL        PHYSICAL EXAM:  GENERAL: NAD, well-developed  HEAD:  Atraumatic, Normocephalic  NECK: Supple, No JVD  CHEST/LUNG: Clear to auscultation bilaterally; No wheezing.  HEART: Regular rate and rhythm; No murmurs, rubs, or gallops  ABDOMEN: Soft, Nontender, Nondistended; Bowel sounds present  EXTREMITIES:   No clubbing, cyanosis, or edema  NEUROLOGY: AAO X 3  SKIN: No rashes    LABS:                        13.6   12.20 )-----------( 393      ( 16 Sep 2018 09:28 )             38.8     09-15    135  |  97  |  <4<L>  ----------------------------<  89  4.3   |  23  |  0.65    Ca    9.4      15 Sep 2018 07:43  Mg     1.7     09-15    TPro  6.8  /  Alb  3.5  /  TBili  0.5  /  DBili  x   /  AST  19  /  ALT  20  /  AlkPhos  61  09-15            CAPILLARY BLOOD GLUCOSE                    RADIOLOGY & ADDITIONAL TESTS:    Imaging Personally Reviewed:    Consultant(s) Notes Reviewed:      Care Discussed with Consultants/Other Providers:

## 2018-09-16 NOTE — PROGRESS NOTE ADULT - ASSESSMENT
54y/o with hx of pancreas divisum, s/p lap cholecystectomy, s/p ERCP with pancreatic stent. CT scan to w/u elevated wbc c/w mild sigmoid diverticulitis (prelim)  - cont cipro  - would add flagyl  - advance diet as tolerated  - f/u official CT report

## 2018-09-16 NOTE — PROGRESS NOTE ADULT - SUBJECTIVE AND OBJECTIVE BOX
INTERVAL HPI/OVERNIGHT EVENTS: Pt still c/o epigastric pain    STATUS POST:  lap cholecystectomy    GOLD o/n. Denies n/v. Tolerating diet. Cont to havefunction. Pain improved.      MEDICATIONS  (STANDING):  amylase/lipase/protease  (CREON 36,000 Units) 2 Capsule(s) Oral three times a day with meals  ciprofloxacin   IVPB 400 milliGRAM(s) IV Intermittent every 12 hours  dextrose 5% + sodium chloride 0.45% with potassium chloride 20 mEq/L 1000 milliLiter(s) (50 mL/Hr) IV Continuous <Continuous>  enoxaparin Injectable 40 milliGRAM(s) SubCutaneous every 24 hours  influenza   Vaccine 0.5 milliLiter(s) IntraMuscular once  lactated ringers. 1000 milliLiter(s) (100 mL/Hr) IV Continuous <Continuous>  nicotine - 21 mG/24Hr(s) Patch 1 patch Transdermal daily  ondansetron Injectable 4 milliGRAM(s) IV Push once  sodium chloride 0.9% lock flush 3 milliLiter(s) IV Push every 8 hours    MEDICATIONS  (PRN):  ALPRAZolam 0.5 milliGRAM(s) Oral two times a day PRN anxiety  HYDROmorphone  Injectable 1 milliGRAM(s) IV Push every 4 hours PRN Severe Pain (7 - 10)  nicotine  Polacrilex Gum 4 milliGRAM(s) Oral every 6 hours PRN nicotine craving  nicotine  Polacrilex Lozenge 4 milliGRAM(s) Oral every 6 hours PRN nicotine craving  ondansetron Injectable 4 milliGRAM(s) IV Push every 6 hours PRN Nausea and/or Vomiting      Vital Signs Last 24 Hrs  T(C): 36.8 (16 Sep 2018 07:03), Max: 36.9 (15 Sep 2018 14:04)  T(F): 98.2 (16 Sep 2018 07:03), Max: 98.5 (15 Sep 2018 14:04)  HR: 70 (16 Sep 2018 07:03) (70 - 85)  BP: 98/64 (16 Sep 2018 07:03) (98/64 - 141/90)  BP(mean): --  RR: 18 (16 Sep 2018 07:03) (18 - 18)  SpO2: 96% (16 Sep 2018 07:03) (95% - 96%)    I&O's Summary    15 Sep 2018 07:01  -  16 Sep 2018 07:00  --------------------------------------------------------  IN: 2440 mL / OUT: 400 mL / NET: 2040 mL        PE: Gen: NAD  CV: RRR\  Pulm: non-labored  Abdominal: Soft, ND, mildly tender to epigastric palpation  ext: wwp      LABS:                        14.0   14.4  )-----------( 343      ( 15 Sep 2018 08:26 )             41.3     09-15    135  |  97  |  <4<L>  ----------------------------<  89  4.3   |  23  |  0.65    Ca    9.4      15 Sep 2018 07:43  Mg     1.7     09-15        4.3   |  23  |  0.65    Ca    9.4      15 Sep 2018 07:43  Mg     1.7     09-15    TPro  6.8  /  Alb  3.5  /  TBili  0.5  /  DBili  x   /  AST  19  /  ALT  20  /  AlkPhos  61  09-15

## 2018-09-17 LAB
ANION GAP SERPL CALC-SCNC: 15 MMOL/L — SIGNIFICANT CHANGE UP (ref 5–17)
BUN SERPL-MCNC: 4 MG/DL — LOW (ref 7–23)
CALCIUM SERPL-MCNC: 9.4 MG/DL — SIGNIFICANT CHANGE UP (ref 8.4–10.5)
CHLORIDE SERPL-SCNC: 98 MMOL/L — SIGNIFICANT CHANGE UP (ref 96–108)
CO2 SERPL-SCNC: 23 MMOL/L — SIGNIFICANT CHANGE UP (ref 22–31)
CREAT SERPL-MCNC: 0.64 MG/DL — SIGNIFICANT CHANGE UP (ref 0.5–1.3)
GLUCOSE SERPL-MCNC: 93 MG/DL — SIGNIFICANT CHANGE UP (ref 70–99)
HCT VFR BLD CALC: 39.4 % — SIGNIFICANT CHANGE UP (ref 34.5–45)
HGB BLD-MCNC: 13.8 G/DL — SIGNIFICANT CHANGE UP (ref 11.5–15.5)
MCHC RBC-ENTMCNC: 33.2 PG — SIGNIFICANT CHANGE UP (ref 27–34)
MCHC RBC-ENTMCNC: 35 GM/DL — SIGNIFICANT CHANGE UP (ref 32–36)
MCV RBC AUTO: 94.7 FL — SIGNIFICANT CHANGE UP (ref 80–100)
PLATELET # BLD AUTO: 392 K/UL — SIGNIFICANT CHANGE UP (ref 150–400)
POTASSIUM SERPL-MCNC: 5 MMOL/L — SIGNIFICANT CHANGE UP (ref 3.5–5.3)
POTASSIUM SERPL-SCNC: 5 MMOL/L — SIGNIFICANT CHANGE UP (ref 3.5–5.3)
RBC # BLD: 4.16 M/UL — SIGNIFICANT CHANGE UP (ref 3.8–5.2)
RBC # FLD: 14.3 % — SIGNIFICANT CHANGE UP (ref 10.3–14.5)
SODIUM SERPL-SCNC: 136 MMOL/L — SIGNIFICANT CHANGE UP (ref 135–145)
WBC # BLD: 11.38 K/UL — HIGH (ref 3.8–10.5)
WBC # FLD AUTO: 11.38 K/UL — HIGH (ref 3.8–10.5)

## 2018-09-17 RX ORDER — HYDROMORPHONE HYDROCHLORIDE 2 MG/ML
2 INJECTION INTRAMUSCULAR; INTRAVENOUS; SUBCUTANEOUS EVERY 6 HOURS
Qty: 0 | Refills: 0 | Status: DISCONTINUED | OUTPATIENT
Start: 2018-09-17 | End: 2018-09-18

## 2018-09-17 RX ORDER — DOCUSATE SODIUM 100 MG
100 CAPSULE ORAL THREE TIMES A DAY
Qty: 0 | Refills: 0 | Status: DISCONTINUED | OUTPATIENT
Start: 2018-09-17 | End: 2018-09-18

## 2018-09-17 RX ORDER — POLYETHYLENE GLYCOL 3350 17 G/17G
17 POWDER, FOR SOLUTION ORAL DAILY
Qty: 0 | Refills: 0 | Status: DISCONTINUED | OUTPATIENT
Start: 2018-09-17 | End: 2018-09-18

## 2018-09-17 RX ORDER — SENNA PLUS 8.6 MG/1
2 TABLET ORAL AT BEDTIME
Qty: 0 | Refills: 0 | Status: DISCONTINUED | OUTPATIENT
Start: 2018-09-17 | End: 2018-09-18

## 2018-09-17 RX ADMIN — Medication 1 PATCH: at 11:56

## 2018-09-17 RX ADMIN — Medication 2 CAPSULE(S): at 18:24

## 2018-09-17 RX ADMIN — SODIUM CHLORIDE 3 MILLILITER(S): 9 INJECTION INTRAMUSCULAR; INTRAVENOUS; SUBCUTANEOUS at 14:31

## 2018-09-17 RX ADMIN — Medication 2 CAPSULE(S): at 09:28

## 2018-09-17 RX ADMIN — HYDROMORPHONE HYDROCHLORIDE 1 MILLIGRAM(S): 2 INJECTION INTRAMUSCULAR; INTRAVENOUS; SUBCUTANEOUS at 04:55

## 2018-09-17 RX ADMIN — INFLUENZA VIRUS VACCINE 0.5 MILLILITER(S): 15; 15; 15; 15 SUSPENSION INTRAMUSCULAR at 21:39

## 2018-09-17 RX ADMIN — SODIUM CHLORIDE 3 MILLILITER(S): 9 INJECTION INTRAMUSCULAR; INTRAVENOUS; SUBCUTANEOUS at 21:36

## 2018-09-17 RX ADMIN — HYDROMORPHONE HYDROCHLORIDE 2 MILLIGRAM(S): 2 INJECTION INTRAMUSCULAR; INTRAVENOUS; SUBCUTANEOUS at 19:26

## 2018-09-17 RX ADMIN — Medication 1 PATCH: at 13:26

## 2018-09-17 RX ADMIN — Medication 2 CAPSULE(S): at 13:27

## 2018-09-17 RX ADMIN — HYDROMORPHONE HYDROCHLORIDE 1 MILLIGRAM(S): 2 INJECTION INTRAMUSCULAR; INTRAVENOUS; SUBCUTANEOUS at 09:55

## 2018-09-17 RX ADMIN — Medication 200 MILLIGRAM(S): at 04:58

## 2018-09-17 RX ADMIN — Medication 0.5 MILLIGRAM(S): at 23:35

## 2018-09-17 RX ADMIN — HYDROMORPHONE HYDROCHLORIDE 1 MILLIGRAM(S): 2 INJECTION INTRAMUSCULAR; INTRAVENOUS; SUBCUTANEOUS at 05:25

## 2018-09-17 RX ADMIN — HYDROMORPHONE HYDROCHLORIDE 1 MILLIGRAM(S): 2 INJECTION INTRAMUSCULAR; INTRAVENOUS; SUBCUTANEOUS at 14:39

## 2018-09-17 RX ADMIN — ENOXAPARIN SODIUM 40 MILLIGRAM(S): 100 INJECTION SUBCUTANEOUS at 21:33

## 2018-09-17 RX ADMIN — HYDROMORPHONE HYDROCHLORIDE 1 MILLIGRAM(S): 2 INJECTION INTRAMUSCULAR; INTRAVENOUS; SUBCUTANEOUS at 15:00

## 2018-09-17 RX ADMIN — SENNA PLUS 2 TABLET(S): 8.6 TABLET ORAL at 21:33

## 2018-09-17 RX ADMIN — HYDROMORPHONE HYDROCHLORIDE 2 MILLIGRAM(S): 2 INJECTION INTRAMUSCULAR; INTRAVENOUS; SUBCUTANEOUS at 20:05

## 2018-09-17 RX ADMIN — Medication 100 MILLIGRAM(S): at 21:33

## 2018-09-17 RX ADMIN — HYDROMORPHONE HYDROCHLORIDE 1 MILLIGRAM(S): 2 INJECTION INTRAMUSCULAR; INTRAVENOUS; SUBCUTANEOUS at 10:15

## 2018-09-17 RX ADMIN — SODIUM CHLORIDE 3 MILLILITER(S): 9 INJECTION INTRAMUSCULAR; INTRAVENOUS; SUBCUTANEOUS at 05:01

## 2018-09-17 NOTE — CONSULT NOTE ADULT - SUBJECTIVE AND OBJECTIVE BOX
HPI:  51F c hx SLE, chronic pancreatitis, ITP s/p splenectomy, current smoker, pw epigastric pain and admitted for pancreatitis.    Dermatology consulted for dark spots on the buttocks    Patient reports noticing dark rough spots on the right buttocks area over the past week. She denies any pain or itching. She denies noticing any lesions in the area before. Denies noticing any blisters in the areas. She has not been treating the area with anything. Nothing makes the spots better or worse. She is concerned because she has not had anything like this before and does not know what it is. Denies a history of HSV in that area or in the mouth or lips. She does not take any medications or immunosuppressants for her SLE.        PAST MEDICAL & SURGICAL HISTORY:  Anxiety  Hernia  Gastritis  Lupus  Pancreatitis: 6 months ago  Lupus  Anxiety  ITP (idiopathic thrombocytopenic purpura)  Pancreas divisum  Pancreatitis  S/P hernia repair: Ventral ( 1/5/2015 )  H/O bilateral breast implants  S/P hysterectomy: 8 years ago- endometriosis  S/P splenectomy: about 20 years ago- ITP  History of hysterectomy: secondary to endometriosis  History of splenectomy      REVIEW OF SYSTEMS    General: no fevers/chills, no lethargy	    Skin/Breast: see HPI  	  Ophthalmologic: no eye pain or change in vision  	  ENMT: no dysphagia or change in hearing    Respiratory and Thorax: no SOB or cough  	  Cardiovascular: no palpitations or chest pain    Gastrointestinal: no abdominal pain or blood in stool     Genitourinary: no dysuria or frequency    Musculoskeletal: no joint pains    Neurological: no weakness, numbness , or tingling    MEDICATIONS  (STANDING):  amylase/lipase/protease  (CREON 36,000 Units) 2 Capsule(s) Oral three times a day with meals  docusate sodium 100 milliGRAM(s) Oral three times a day  enoxaparin Injectable 40 milliGRAM(s) SubCutaneous every 24 hours  influenza   Vaccine 0.5 milliLiter(s) IntraMuscular once  lactated ringers. 1000 milliLiter(s) (50 mL/Hr) IV Continuous <Continuous>  nicotine - 21 mG/24Hr(s) Patch 1 patch Transdermal daily  ondansetron Injectable 4 milliGRAM(s) IV Push once  polyethylene glycol 3350 17 Gram(s) Oral daily  senna 2 Tablet(s) Oral at bedtime  sodium chloride 0.9% lock flush 3 milliLiter(s) IV Push every 8 hours    MEDICATIONS  (PRN):  ALPRAZolam 0.5 milliGRAM(s) Oral two times a day PRN anxiety  HYDROmorphone  Injectable 1 milliGRAM(s) IV Push every 4 hours PRN Severe Pain (7 - 10)  nicotine  Polacrilex Gum 4 milliGRAM(s) Oral every 6 hours PRN nicotine craving  nicotine  Polacrilex Lozenge 4 milliGRAM(s) Oral every 6 hours PRN nicotine craving  ondansetron Injectable 4 milliGRAM(s) IV Push every 6 hours PRN Nausea and/or Vomiting      Allergies    No Known Allergies    Intolerances        SOCIAL HISTORY:    FAMILY HISTORY:  Family history of colon cancer  Family history of breast cancer (Grandparent)  Family history of colon cancer  Family history of liver disease: liver cirrhosis      Vital Signs Last 24 Hrs  T(C): 36.8 (17 Sep 2018 17:23), Max: 37.1 (16 Sep 2018 22:11)  T(F): 98.3 (17 Sep 2018 17:23), Max: 98.8 (16 Sep 2018 22:11)  HR: 71 (17 Sep 2018 17:23) (65 - 87)  BP: 120/78 (17 Sep 2018 17:23) (103/73 - 131/85)  BP(mean): --  RR: 18 (17 Sep 2018 17:23) (18 - 18)  SpO2: 96% (17 Sep 2018 17:23) (94% - 98%)    PHYSICAL EXAM:     The patient was alert and oriented X 3, well nourished, and in no  apparent distress.  OP showed no ulcerations  There was no visible lymphadenopathy.  Conjunctiva were non injected  There was no clubbing or edema of extremities.  The scalp, hair, face, eyebrows, lips, OP, neck, chest, back,   extremities X 4, nails were examined.  There was no hyperhidrosis or bromhidrosis.    Of note on skin exam:   the right gluteal fold with grouped papules with hemorrhagic crust    the left gluteal fold area without lesions seen     the OP is normal     LABS:                        13.8   11.38 )-----------( 392      ( 17 Sep 2018 07:56 )             39.4     09-17    136  |  98  |  4<L>  ----------------------------<  93  5.0   |  23  |  0.64    Ca    9.4      17 Sep 2018 06:23            RADIOLOGY & ADDITIONAL STUDIES:

## 2018-09-17 NOTE — PROGRESS NOTE ADULT - ASSESSMENT
A/P: Pancreas divisum with post ERP pancreatitis. Improving. Agree with bowel regimen for constipation. Change dilaudid to po. Cipro d/dante. OK to d/c home tomorrow and follow up in the office in 2 weeks. Will remove PD stent in 5-6 weeks. She and  are in agreement with this plan.

## 2018-09-17 NOTE — PROGRESS NOTE ADULT - ASSESSMENT
54y/o with hx of pancreas divisum, s/p lap cholecystectomy, s/p ERCP with pancreatic stent. CT scan to w/u elevated wbc c/w mild sigmoid diverticulitis (prelim); still waiting on official read.     Plan:   - cont cipro  - Recommend adding flagyl to regimen   - advance diet as tolerated  - f/u official CT report    Rafal Garland, PGY2  [id number] 52y/o with hx of pancreas divisum, s/p lap cholecystectomy, s/p ERCP with pancreatic stent. CT scan to w/u elevated wbc c/w mild sigmoid diverticulitis (prelim); still waiting on official read. Lower abdomen is nontender on physical exam.     Plan:   - cont cipro  - Recommend adding flagyl to regimen   - advance diet as tolerated  - f/u official CT report    Rafal Garland, PGY2  [id number] 54y/o with hx of pancreas divisum, s/p lap cholecystectomy, s/p ERCP with pancreatic stent. CT scan to w/u elevated wbc c/w mild sigmoid diverticulitis (prelim); still waiting on official read. Lower abdomen is nontender on physical exam.     Plan:   - cont cipro  - Recommend adding flagyl to regimen   - advance diet as tolerated  - f/u official CT report    Rafal Garland, PGY2  [id number]      ADDENDUM 345PM    CT A/P final read (below). No evidence of diverticulitis. Patient is without LLQ pain or tenderness, diarrhea or constipation, and is tolerating a diet. Now off abx. Continue care per primary team. Please call with any further questions.      < from: CT Abdomen and Pelvis w/ IV Cont (09.14.18 @ 19:07) >  LOWER CHEST: Partially imaged bilateral breast implants. Bibasilar   subsegmental atelectasis.    LIVER: Within normal limits.  BILE DUCTS: Mild intrahepatic biliary ductal dilatation around the right   without significant change.  GALLBLADDER: Interval cholecystectomy with trace fluid at the operative   bed.  SPLEEN: Splenectomy. Small left upper quadrant splenule.  PANCREAS: Homogeneous enhancement, without necrosis. Pancreatic duct   stent. No pancreatic ductal dilatation. Mild peripancreatic inflammation   adjacent to the tail consistent with pancreatitis. No peripancreatic   collection.  ADRENALS: Within normal limits.  KIDNEYS/URETERS: Within normal limits.    BLADDER: Within normal limits.  REPRODUCTIVE ORGANS:Hysterectomy.    BOWEL: No bowel obstruction. Appendix is normal.  PERITONEUM: Trace free fluid in the pelvis.  VESSELS:  Atherosclerotic changes.  RETROPERITONEUM: No lymphadenopathy.    ABDOMINAL WALL: Within normal limits.  BONES: Within normal limits.    IMPRESSION: Mild acute pancreatitis. No evidence of complication.    < end of copied text >

## 2018-09-17 NOTE — PROGRESS NOTE ADULT - SUBJECTIVE AND OBJECTIVE BOX
Patient is a 53y old  Female who presents with a chief complaint of Abdominal pain and nausea (17 Sep 2018 05:45)      SUBJECTIVE / OVERNIGHT EVENTS:   Feels better.  Denies CP/SOB/Palpitation/HA.    MEDICATIONS  (STANDING):  amylase/lipase/protease  (CREON 36,000 Units) 2 Capsule(s) Oral three times a day with meals  docusate sodium 100 milliGRAM(s) Oral three times a day  enoxaparin Injectable 40 milliGRAM(s) SubCutaneous every 24 hours  influenza   Vaccine 0.5 milliLiter(s) IntraMuscular once  lactated ringers. 1000 milliLiter(s) (50 mL/Hr) IV Continuous <Continuous>  nicotine - 21 mG/24Hr(s) Patch 1 patch Transdermal daily  ondansetron Injectable 4 milliGRAM(s) IV Push once  polyethylene glycol 3350 17 Gram(s) Oral daily  senna 2 Tablet(s) Oral at bedtime  sodium chloride 0.9% lock flush 3 milliLiter(s) IV Push every 8 hours    MEDICATIONS  (PRN):  ALPRAZolam 0.5 milliGRAM(s) Oral two times a day PRN anxiety  HYDROmorphone  Injectable 1 milliGRAM(s) IV Push every 4 hours PRN Severe Pain (7 - 10)  nicotine  Polacrilex Gum 4 milliGRAM(s) Oral every 6 hours PRN nicotine craving  nicotine  Polacrilex Lozenge 4 milliGRAM(s) Oral every 6 hours PRN nicotine craving  ondansetron Injectable 4 milliGRAM(s) IV Push every 6 hours PRN Nausea and/or Vomiting        CAPILLARY BLOOD GLUCOSE        I&O's Summary    16 Sep 2018 07:01  -  17 Sep 2018 07:00  --------------------------------------------------------  IN: 1480 mL / OUT: 1000 mL / NET: 480 mL    17 Sep 2018 07:01  -  17 Sep 2018 15:27  --------------------------------------------------------  IN: 480 mL / OUT: 0 mL / NET: 480 mL        PHYSICAL EXAM:  GENERAL: NAD, well-developed  HEAD:  Atraumatic, Normocephalic  NECK: Supple, No JVD  CHEST/LUNG: Clear to auscultation bilaterally; No wheezing.  HEART: Regular rate and rhythm; No murmurs, rubs, or gallops  ABDOMEN: Soft, Nontender, Nondistended; Bowel sounds present  EXTREMITIES:   No clubbing, cyanosis, or edema  NEUROLOGY: AAO X 3  SKIN: No rashes    LABS:                        13.8   11.38 )-----------( 392      ( 17 Sep 2018 07:56 )             39.4     09-17    136  |  98  |  4<L>  ----------------------------<  93  5.0   |  23  |  0.64    Ca    9.4      17 Sep 2018 06:23              CAPILLARY BLOOD GLUCOSE                    RADIOLOGY & ADDITIONAL TESTS:    Imaging Personally Reviewed:    Consultant(s) Notes Reviewed:      Care Discussed with Consultants/Other Providers:

## 2018-09-17 NOTE — PROGRESS NOTE ADULT - SUBJECTIVE AND OBJECTIVE BOX
SUBJECTIVE: She feels better. Tolerating low fat diet without worsening pain. No nausea. No fever.     MEDICATIONS  (STANDING):  amylase/lipase/protease  (CREON 36,000 Units) 2 Capsule(s) Oral three times a day with meals  docusate sodium 100 milliGRAM(s) Oral three times a day  enoxaparin Injectable 40 milliGRAM(s) SubCutaneous every 24 hours  influenza   Vaccine 0.5 milliLiter(s) IntraMuscular once  lactated ringers. 1000 milliLiter(s) (50 mL/Hr) IV Continuous <Continuous>  nicotine - 21 mG/24Hr(s) Patch 1 patch Transdermal daily  ondansetron Injectable 4 milliGRAM(s) IV Push once  polyethylene glycol 3350 17 Gram(s) Oral daily  senna 2 Tablet(s) Oral at bedtime  sodium chloride 0.9% lock flush 3 milliLiter(s) IV Push every 8 hours    MEDICATIONS  (PRN):  ALPRAZolam 0.5 milliGRAM(s) Oral two times a day PRN anxiety  HYDROmorphone   Tablet 2 milliGRAM(s) Oral every 6 hours PRN Severe Pain (7 - 10)  nicotine  Polacrilex Gum 4 milliGRAM(s) Oral every 6 hours PRN nicotine craving  nicotine  Polacrilex Lozenge 4 milliGRAM(s) Oral every 6 hours PRN nicotine craving  ondansetron Injectable 4 milliGRAM(s) IV Push every 6 hours PRN Nausea and/or Vomiting      OBJECTIVE:  Vital Signs Last 24 Hrs  T(C): 36.8 (17 Sep 2018 17:23), Max: 37.1 (16 Sep 2018 22:11)  T(F): 98.3 (17 Sep 2018 17:23), Max: 98.8 (16 Sep 2018 22:11)  HR: 71 (17 Sep 2018 17:23) (65 - 87)  BP: 120/78 (17 Sep 2018 17:23) (103/73 - 131/85)  BP(mean): --  RR: 18 (17 Sep 2018 17:23) (18 - 18)  SpO2: 96% (17 Sep 2018 17:23) (94% - 98%)    PHYSICAL EXAM:  Constitutional: alert and oriented, in no acute distress  HEENT: no scleral icterus, no palpable lymph nodes  Cardiovascular: normal heart sounds  Respiratory: clear lungs  Gastrointestinal: soft, minimal epigastric tenderness, nondistended, normal bowel sounds,   Extremities: No peripheral edema b/l     LABS:                        13.8   11.38 )-----------( 392      ( 17 Sep 2018 07:56 )             39.4     09-17    136  |  98  |  4<L>  ----------------------------<  93  5.0   |  23  |  0.64    Ca    9.4      17 Sep 2018 06:23          LIVER FUNCTIONS - ( 15 Sep 2018 07:43 )  Alb: 3.5 g/dL / Pro: 6.8 g/dL / ALK PHOS: 61 U/L / ALT: 20 U/L / AST: 19 U/L / GGT: x             RADIOLOGY & ADDITIONAL TESTS:

## 2018-09-17 NOTE — CONSULT NOTE ADULT - ASSESSMENT
1. Crusted lesions on the right gluteal cleft. 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 The current skin findings represent healing lesions and post inflammatory hyperpigmentation.   -Given that patient is asymptomatic and the lesions have crusted over and are healing there is no need to treat at this time  -If patient develops new lesions, is symptomatic, please call dermatology and we can re-evaluate and treat as needed. Not all patients are symptomatic from herpetic lesions.     Patient to follow up at [facility] [address] [phone]    Laury Castro MD   Dermatology Resident PGY-2   [phone] 1. Crusted lesions on the right gluteal cleft. 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 The current skin findings represent healing lesions and post inflammatory hyperpigmentation.   -Given that patient is asymptomatic and the lesions have crusted over and are healing there is no need to treat at this time  -Mupirocin ointment to apply to the AA BID for 2 weeks or until healed   -If patient develops new lesions, is symptomatic, please call dermatology and we can re-evaluate and treat as needed. Not all patients are symptomatic from herpetic lesions.     Patient to follow up at [facility] [address] [phone]    Laury Castro MD   Dermatology Resident PGY-2   [phone]

## 2018-09-17 NOTE — PROGRESS NOTE ADULT - SUBJECTIVE AND OBJECTIVE BOX
Red Surgery Consult - Daily Progress Note    INTERVAL HPI/OVERNIGHT EVENTS: Pain continues to improve. Tolerating diet. No acute events overnight.     STATUS POST:  lap cholecystectomy    Vital Signs Last 24 Hrs  T(C): 36.8 (17 Sep 2018 05:43), Max: 37.1 (16 Sep 2018 22:11)  T(F): 98.2 (17 Sep 2018 05:43), Max: 98.8 (16 Sep 2018 22:11)  HR: 65 (17 Sep 2018 05:43) (65 - 87)  BP: 126/82 (17 Sep 2018 05:43) (98/64 - 131/85)  BP(mean): --  RR: 18 (17 Sep 2018 05:43) (18 - 18)  SpO2: 97% (17 Sep 2018 05:43) (96% - 97%)    09-15-18 @ 07:01  -  09-16-18 @ 07:00  --------------------------------------------------------  IN: 2440 mL / OUT: 400 mL / NET: 2040 mL    09-16-18 @ 07:01  -  09-17-18 @ 05:46  --------------------------------------------------------  IN: 880 mL / OUT: 1000 mL / NET: -120 mL      MEDICATIONS  (STANDING):  amylase/lipase/protease  (CREON 36,000 Units) 2 Capsule(s) Oral three times a day with meals  ciprofloxacin   IVPB 400 milliGRAM(s) IV Intermittent every 12 hours  enoxaparin Injectable 40 milliGRAM(s) SubCutaneous every 24 hours  influenza   Vaccine 0.5 milliLiter(s) IntraMuscular once  lactated ringers. 1000 milliLiter(s) (50 mL/Hr) IV Continuous <Continuous>  nicotine - 21 mG/24Hr(s) Patch 1 patch Transdermal daily  ondansetron Injectable 4 milliGRAM(s) IV Push once  sodium chloride 0.9% lock flush 3 milliLiter(s) IV Push every 8 hours    MEDICATIONS  (PRN):  ALPRAZolam 0.5 milliGRAM(s) Oral two times a day PRN anxiety  HYDROmorphone  Injectable 1 milliGRAM(s) IV Push every 4 hours PRN Severe Pain (7 - 10)  nicotine  Polacrilex Gum 4 milliGRAM(s) Oral every 6 hours PRN nicotine craving  nicotine  Polacrilex Lozenge 4 milliGRAM(s) Oral every 6 hours PRN nicotine craving  ondansetron Injectable 4 milliGRAM(s) IV Push every 6 hours PRN Nausea and/or Vomiting      PE: Gen: NAD  CV: normotensive, regular rate   Pulm: non-labored  Abdominal: Soft, ND, minimal tenderness to epigastric palpation      LABS:  CBC (09-16 @ 09:28)                              13.6                           12.20<H>  )----------------(  393        --    % Neutrophils, --    % Lymphocytes, ANC: --                                  38.8 Red Surgery Consult - Daily Progress Note    INTERVAL HPI/OVERNIGHT EVENTS: Pain continues to improve. Tolerating diet. First solid food was yesterday. No acute events overnight.     STATUS POST:  lap cholecystectomy    Vital Signs Last 24 Hrs  T(C): 36.8 (17 Sep 2018 05:43), Max: 37.1 (16 Sep 2018 22:11)  T(F): 98.2 (17 Sep 2018 05:43), Max: 98.8 (16 Sep 2018 22:11)  HR: 65 (17 Sep 2018 05:43) (65 - 87)  BP: 126/82 (17 Sep 2018 05:43) (98/64 - 131/85)  BP(mean): --  RR: 18 (17 Sep 2018 05:43) (18 - 18)  SpO2: 97% (17 Sep 2018 05:43) (96% - 97%)    09-15-18 @ 07:01  -  09-16-18 @ 07:00  --------------------------------------------------------  IN: 2440 mL / OUT: 400 mL / NET: 2040 mL    09-16-18 @ 07:01  -  09-17-18 @ 05:46  --------------------------------------------------------  IN: 880 mL / OUT: 1000 mL / NET: -120 mL      MEDICATIONS  (STANDING):  amylase/lipase/protease  (CREON 36,000 Units) 2 Capsule(s) Oral three times a day with meals  ciprofloxacin   IVPB 400 milliGRAM(s) IV Intermittent every 12 hours  enoxaparin Injectable 40 milliGRAM(s) SubCutaneous every 24 hours  influenza   Vaccine 0.5 milliLiter(s) IntraMuscular once  lactated ringers. 1000 milliLiter(s) (50 mL/Hr) IV Continuous <Continuous>  nicotine - 21 mG/24Hr(s) Patch 1 patch Transdermal daily  ondansetron Injectable 4 milliGRAM(s) IV Push once  sodium chloride 0.9% lock flush 3 milliLiter(s) IV Push every 8 hours    MEDICATIONS  (PRN):  ALPRAZolam 0.5 milliGRAM(s) Oral two times a day PRN anxiety  HYDROmorphone  Injectable 1 milliGRAM(s) IV Push every 4 hours PRN Severe Pain (7 - 10)  nicotine  Polacrilex Gum 4 milliGRAM(s) Oral every 6 hours PRN nicotine craving  nicotine  Polacrilex Lozenge 4 milliGRAM(s) Oral every 6 hours PRN nicotine craving  ondansetron Injectable 4 milliGRAM(s) IV Push every 6 hours PRN Nausea and/or Vomiting      PE: Gen: NAD  CV: normotensive, regular rate   Pulm: non-labored  Abdominal: Soft, ND, minimal tenderness to epigastric palpation      LABS:  CBC (09-16 @ 09:28)                              13.6                           12.20<H>  )----------------(  393        --    % Neutrophils, --    % Lymphocytes, ANC: --                                  38.8

## 2018-09-18 VITALS
TEMPERATURE: 98 F | RESPIRATION RATE: 18 BRPM | OXYGEN SATURATION: 98 % | DIASTOLIC BLOOD PRESSURE: 73 MMHG | HEART RATE: 66 BPM | SYSTOLIC BLOOD PRESSURE: 110 MMHG

## 2018-09-18 LAB
ANION GAP SERPL CALC-SCNC: 15 MMOL/L — SIGNIFICANT CHANGE UP (ref 5–17)
BASOPHILS # BLD AUTO: 0.06 K/UL — SIGNIFICANT CHANGE UP (ref 0–0.2)
BASOPHILS NFR BLD AUTO: 0.5 % — SIGNIFICANT CHANGE UP (ref 0–2)
BUN SERPL-MCNC: <4 MG/DL — LOW (ref 7–23)
CALCIUM SERPL-MCNC: 9.6 MG/DL — SIGNIFICANT CHANGE UP (ref 8.4–10.5)
CHLORIDE SERPL-SCNC: 101 MMOL/L — SIGNIFICANT CHANGE UP (ref 96–108)
CO2 SERPL-SCNC: 24 MMOL/L — SIGNIFICANT CHANGE UP (ref 22–31)
CREAT SERPL-MCNC: 0.65 MG/DL — SIGNIFICANT CHANGE UP (ref 0.5–1.3)
EOSINOPHIL # BLD AUTO: 0.51 K/UL — HIGH (ref 0–0.5)
EOSINOPHIL NFR BLD AUTO: 4.5 % — SIGNIFICANT CHANGE UP (ref 0–6)
GLUCOSE SERPL-MCNC: 85 MG/DL — SIGNIFICANT CHANGE UP (ref 70–99)
HCT VFR BLD CALC: 39.1 % — SIGNIFICANT CHANGE UP (ref 34.5–45)
HGB BLD-MCNC: 13.8 G/DL — SIGNIFICANT CHANGE UP (ref 11.5–15.5)
IMM GRANULOCYTES NFR BLD AUTO: 0.3 % — SIGNIFICANT CHANGE UP (ref 0–1.5)
LYMPHOCYTES # BLD AUTO: 3.61 K/UL — HIGH (ref 1–3.3)
LYMPHOCYTES # BLD AUTO: 31.6 % — SIGNIFICANT CHANGE UP (ref 13–44)
MCHC RBC-ENTMCNC: 33.3 PG — SIGNIFICANT CHANGE UP (ref 27–34)
MCHC RBC-ENTMCNC: 35.3 GM/DL — SIGNIFICANT CHANGE UP (ref 32–36)
MCV RBC AUTO: 94.2 FL — SIGNIFICANT CHANGE UP (ref 80–100)
MONOCYTES # BLD AUTO: 1.05 K/UL — HIGH (ref 0–0.9)
MONOCYTES NFR BLD AUTO: 9.2 % — SIGNIFICANT CHANGE UP (ref 2–14)
NEUTROPHILS # BLD AUTO: 6.16 K/UL — SIGNIFICANT CHANGE UP (ref 1.8–7.4)
NEUTROPHILS NFR BLD AUTO: 53.9 % — SIGNIFICANT CHANGE UP (ref 43–77)
PLATELET # BLD AUTO: 414 K/UL — HIGH (ref 150–400)
POTASSIUM SERPL-MCNC: 4.4 MMOL/L — SIGNIFICANT CHANGE UP (ref 3.5–5.3)
POTASSIUM SERPL-SCNC: 4.4 MMOL/L — SIGNIFICANT CHANGE UP (ref 3.5–5.3)
RBC # BLD: 4.15 M/UL — SIGNIFICANT CHANGE UP (ref 3.8–5.2)
RBC # FLD: 14.3 % — SIGNIFICANT CHANGE UP (ref 10.3–14.5)
SODIUM SERPL-SCNC: 140 MMOL/L — SIGNIFICANT CHANGE UP (ref 135–145)
WBC # BLD: 11.42 K/UL — HIGH (ref 3.8–10.5)
WBC # FLD AUTO: 11.42 K/UL — HIGH (ref 3.8–10.5)

## 2018-09-18 PROCEDURE — 71046 X-RAY EXAM CHEST 2 VIEWS: CPT

## 2018-09-18 PROCEDURE — 84484 ASSAY OF TROPONIN QUANT: CPT

## 2018-09-18 PROCEDURE — 81001 URINALYSIS AUTO W/SCOPE: CPT

## 2018-09-18 PROCEDURE — 88305 TISSUE EXAM BY PATHOLOGIST: CPT

## 2018-09-18 PROCEDURE — 93005 ELECTROCARDIOGRAM TRACING: CPT

## 2018-09-18 PROCEDURE — 74177 CT ABD & PELVIS W/CONTRAST: CPT

## 2018-09-18 PROCEDURE — C1769: CPT

## 2018-09-18 PROCEDURE — 83735 ASSAY OF MAGNESIUM: CPT

## 2018-09-18 PROCEDURE — 99285 EMERGENCY DEPT VISIT HI MDM: CPT | Mod: 25

## 2018-09-18 PROCEDURE — 86900 BLOOD TYPING SEROLOGIC ABO: CPT

## 2018-09-18 PROCEDURE — 80061 LIPID PANEL: CPT

## 2018-09-18 PROCEDURE — 86850 RBC ANTIBODY SCREEN: CPT

## 2018-09-18 PROCEDURE — 96374 THER/PROPH/DIAG INJ IV PUSH: CPT

## 2018-09-18 PROCEDURE — 82150 ASSAY OF AMYLASE: CPT

## 2018-09-18 PROCEDURE — 85610 PROTHROMBIN TIME: CPT

## 2018-09-18 PROCEDURE — 80048 BASIC METABOLIC PNL TOTAL CA: CPT

## 2018-09-18 PROCEDURE — 83690 ASSAY OF LIPASE: CPT

## 2018-09-18 PROCEDURE — 96375 TX/PRO/DX INJ NEW DRUG ADDON: CPT

## 2018-09-18 PROCEDURE — 86901 BLOOD TYPING SEROLOGIC RH(D): CPT

## 2018-09-18 PROCEDURE — C2617: CPT

## 2018-09-18 PROCEDURE — 88312 SPECIAL STAINS GROUP 1: CPT

## 2018-09-18 PROCEDURE — A9585: CPT

## 2018-09-18 PROCEDURE — 84443 ASSAY THYROID STIM HORMONE: CPT

## 2018-09-18 PROCEDURE — 76700 US EXAM ABDOM COMPLETE: CPT

## 2018-09-18 PROCEDURE — 85027 COMPLETE CBC AUTOMATED: CPT

## 2018-09-18 PROCEDURE — 80076 HEPATIC FUNCTION PANEL: CPT

## 2018-09-18 PROCEDURE — 83036 HEMOGLOBIN GLYCOSYLATED A1C: CPT

## 2018-09-18 PROCEDURE — 74183 MRI ABD W/O CNTR FLWD CNTR: CPT

## 2018-09-18 PROCEDURE — 83615 LACTATE (LD) (LDH) ENZYME: CPT

## 2018-09-18 PROCEDURE — 80053 COMPREHEN METABOLIC PANEL: CPT

## 2018-09-18 PROCEDURE — 90686 IIV4 VACC NO PRSV 0.5 ML IM: CPT

## 2018-09-18 PROCEDURE — 88304 TISSUE EXAM BY PATHOLOGIST: CPT

## 2018-09-18 PROCEDURE — 84100 ASSAY OF PHOSPHORUS: CPT

## 2018-09-18 PROCEDURE — 85730 THROMBOPLASTIN TIME PARTIAL: CPT

## 2018-09-18 PROCEDURE — 74330 X-RAY BILE/PANC ENDOSCOPY: CPT

## 2018-09-18 RX ORDER — DOCUSATE SODIUM 100 MG
1 CAPSULE ORAL
Qty: 90 | Refills: 0
Start: 2018-09-18 | End: 2018-10-17

## 2018-09-18 RX ORDER — NICOTINE POLACRILEX 2 MG
1 GUM BUCCAL
Qty: 84 | Refills: 0
Start: 2018-09-18 | End: 2018-10-08

## 2018-09-18 RX ORDER — LIPASE/PROTEASE/AMYLASE 16-48-48K
2 CAPSULE,DELAYED RELEASE (ENTERIC COATED) ORAL
Qty: 180 | Refills: 0
Start: 2018-09-18 | End: 2018-10-17

## 2018-09-18 RX ORDER — SENNA PLUS 8.6 MG/1
2 TABLET ORAL
Qty: 60 | Refills: 0
Start: 2018-09-18 | End: 2018-10-17

## 2018-09-18 RX ORDER — NICOTINE POLACRILEX 2 MG
1 GUM BUCCAL
Qty: 21 | Refills: 0
Start: 2018-09-18 | End: 2018-10-08

## 2018-09-18 RX ORDER — HYDROMORPHONE HYDROCHLORIDE 2 MG/ML
1 INJECTION INTRAMUSCULAR; INTRAVENOUS; SUBCUTANEOUS
Qty: 20 | Refills: 0
Start: 2018-09-18 | End: 2018-09-22

## 2018-09-18 RX ORDER — POLYETHYLENE GLYCOL 3350 17 G/17G
17 POWDER, FOR SOLUTION ORAL
Qty: 1770 | Refills: 0
Start: 2018-09-18 | End: 2018-10-17

## 2018-09-18 RX ADMIN — HYDROMORPHONE HYDROCHLORIDE 2 MILLIGRAM(S): 2 INJECTION INTRAMUSCULAR; INTRAVENOUS; SUBCUTANEOUS at 06:43

## 2018-09-18 RX ADMIN — Medication 100 MILLIGRAM(S): at 13:01

## 2018-09-18 RX ADMIN — Medication 1 PATCH: at 12:03

## 2018-09-18 RX ADMIN — SODIUM CHLORIDE 3 MILLILITER(S): 9 INJECTION INTRAMUSCULAR; INTRAVENOUS; SUBCUTANEOUS at 13:01

## 2018-09-18 RX ADMIN — SODIUM CHLORIDE 3 MILLILITER(S): 9 INJECTION INTRAMUSCULAR; INTRAVENOUS; SUBCUTANEOUS at 05:35

## 2018-09-18 RX ADMIN — Medication 1 PATCH: at 12:01

## 2018-09-18 RX ADMIN — POLYETHYLENE GLYCOL 3350 17 GRAM(S): 17 POWDER, FOR SOLUTION ORAL at 12:01

## 2018-09-18 RX ADMIN — Medication 2 CAPSULE(S): at 09:31

## 2018-09-18 RX ADMIN — HYDROMORPHONE HYDROCHLORIDE 2 MILLIGRAM(S): 2 INJECTION INTRAMUSCULAR; INTRAVENOUS; SUBCUTANEOUS at 13:01

## 2018-09-18 RX ADMIN — HYDROMORPHONE HYDROCHLORIDE 2 MILLIGRAM(S): 2 INJECTION INTRAMUSCULAR; INTRAVENOUS; SUBCUTANEOUS at 13:31

## 2018-09-18 RX ADMIN — Medication 2 CAPSULE(S): at 12:01

## 2018-09-18 RX ADMIN — Medication 100 MILLIGRAM(S): at 05:34

## 2018-09-18 NOTE — PROGRESS NOTE ADULT - SUBJECTIVE AND OBJECTIVE BOX
Patient is a 53y old  Female who presents with a chief complaint of Abdominal pain and nausea (17 Sep 2018 18:56)      SUBJECTIVE / OVERNIGHT EVENTS:   Feels better.  Denies CP/SOB/Palpitation/HA.    MEDICATIONS  (STANDING):  amylase/lipase/protease  (CREON 36,000 Units) 2 Capsule(s) Oral three times a day with meals  docusate sodium 100 milliGRAM(s) Oral three times a day  enoxaparin Injectable 40 milliGRAM(s) SubCutaneous every 24 hours  lactated ringers. 1000 milliLiter(s) (50 mL/Hr) IV Continuous <Continuous>  nicotine - 21 mG/24Hr(s) Patch 1 patch Transdermal daily  ondansetron Injectable 4 milliGRAM(s) IV Push once  polyethylene glycol 3350 17 Gram(s) Oral daily  senna 2 Tablet(s) Oral at bedtime  sodium chloride 0.9% lock flush 3 milliLiter(s) IV Push every 8 hours    MEDICATIONS  (PRN):  HYDROmorphone   Tablet 2 milliGRAM(s) Oral every 6 hours PRN Severe Pain (7 - 10)  nicotine  Polacrilex Gum 4 milliGRAM(s) Oral every 6 hours PRN nicotine craving  nicotine  Polacrilex Lozenge 4 milliGRAM(s) Oral every 6 hours PRN nicotine craving  ondansetron Injectable 4 milliGRAM(s) IV Push every 6 hours PRN Nausea and/or Vomiting        CAPILLARY BLOOD GLUCOSE        I&O's Summary    17 Sep 2018 07:01  -  18 Sep 2018 07:00  --------------------------------------------------------  IN: 1420 mL / OUT: 0 mL / NET: 1420 mL    18 Sep 2018 07:01  -  18 Sep 2018 14:43  --------------------------------------------------------  IN: 540 mL / OUT: 0 mL / NET: 540 mL        PHYSICAL EXAM:  GENERAL: NAD, well-developed  HEAD:  Atraumatic, Normocephalic  NECK: Supple, No JVD  CHEST/LUNG: Clear to auscultation bilaterally; No wheezing.  HEART: Regular rate and rhythm; No murmurs, rubs, or gallops  ABDOMEN: Soft, Nontender, Nondistended; Bowel sounds present  EXTREMITIES:   No clubbing, cyanosis, or edema  NEUROLOGY: AAO X 3  SKIN: No rashes    LABS:                        13.8   11.42 )-----------( 414      ( 18 Sep 2018 09:06 )             39.1     09-18    140  |  101  |  <4<L>  ----------------------------<  85  4.4   |  24  |  0.65    Ca    9.6      18 Sep 2018 07:12              CAPILLARY BLOOD GLUCOSE                    RADIOLOGY & ADDITIONAL TESTS:    Imaging Personally Reviewed:    Consultant(s) Notes Reviewed:      Care Discussed with Consultants/Other Providers:

## 2018-09-18 NOTE — PROGRESS NOTE ADULT - ASSESSMENT
· Assessment	  51F c hx SLE, chronic pancreatitis, ITP s/p splenectomy, current smoker, pw pancreatitis         Problem/Plan - 1:  · Abd pain sec to post procedure pancreatitis:    Tolerates PO    DC planning.

## 2018-09-18 NOTE — PROGRESS NOTE ADULT - PROVIDER SPECIALTY LIST ADULT
Colorectal Surgery
Gastroenterology
Internal Medicine
Surgery
Internal Medicine
Gastroenterology

## 2018-09-18 NOTE — PROGRESS NOTE ADULT - REASON FOR ADMISSION
Abdominal pain and nausea

## 2018-12-17 ENCOUNTER — TRANSCRIPTION ENCOUNTER (OUTPATIENT)
Age: 53
End: 2018-12-17

## 2018-12-20 ENCOUNTER — TRANSCRIPTION ENCOUNTER (OUTPATIENT)
Age: 53
End: 2018-12-20

## 2019-01-15 ENCOUNTER — TRANSCRIPTION ENCOUNTER (OUTPATIENT)
Age: 54
End: 2019-01-15

## 2019-03-02 ENCOUNTER — TRANSCRIPTION ENCOUNTER (OUTPATIENT)
Age: 54
End: 2019-03-02

## 2019-06-03 NOTE — ANESTHESIA FOLLOW-UP NOTE - NSCONDITION_GEN_ALL_CORE
Janiya 3, 2019                     [facility]  [address]  [facility] [address]  Phone: [phone]   Patient: Cesario Bailey   MR Number: [id number]   YOB: 1963   Date of Visit: 6/3/2019       Dear      Thank you for referring Cesario Bailey to me for evaluation. Attached you will find relevant portions of my assessment and plan of care.    If you have questions, please do not hesitate to call me. I look forward to following Cesario Bailey along with you.    Sincerely,    Oni Faulkner MD    Enclosure    If you would like to receive this communication electronically, please contact [email or web address] or [phone] to request [employer] access.    [employer] is a tool which provides read-only access to select patient information with whom you have a relationship. Its easy to use and provides real time access to review your patients record including encounter summaries, notes, results, and demographic information.    If you feel you have received this communication in error or would no longer like to receive these types of communications, please e-mail [email or web address]        Stable

## 2019-12-04 NOTE — ED ADULT TRIAGE NOTE - ARRIVAL FROM
Detail Level: Detailed
Quality 111:Pneumonia Vaccination Status For Older Adults: Pneumococcal Vaccination Previously Received
Quality 226: Preventive Care And Screening: Tobacco Use: Screening And Cessation Intervention: Patient screened for tobacco use, is a smoker AND received Cessation Counseling
Home

## 2019-12-19 NOTE — ED ADULT NURSE NOTE - CAS TRG GEN SKIN COLOR
POSTPARTUM DISCHARGE INSTRUCTIONS FOR MOM    YOB: 1984   Age: 35 y.o.               Admit Date: 2019     Discharge Date: 2019  Attending Doctor:  Mj Rutherford M.D.                  Allergies:  Penicillins    Discharged to home by car. Discharged via wheelchair, hospital escort: Yes.  Special equipment needed: Not Applicable  Belongings with: Personal  Be sure to schedule a follow-up appointment with your primary care doctor or any specialists as instructed.     Discharge Plan:   Diet Plan: Discussed  Activity Level: Discussed  Confirmed Follow up Appointment: Patient to Call and Schedule Appointment  Confirmed Symptoms Management: Discussed  Medication Reconciliation Updated: Yes  Influenza Vaccine Indication: Indicated: 9 to 64 years of age    REASONS TO CALL YOUR OBSTETRICIAN:  1.   Persistent fever or shaking chills (Temperature higher than 100.4)  2.   Heavy bleeding (soaking more than 1 pad per hour); Passing clots  3.   Foul odor from vagina  4.   Mastitis (Breast infection; breast pain, chills, fever, redness)  5.   Urinary pain, burning or frequency  6.   Episiotomy infection  7.   Abdominal incision infection  8.   Severe depression longer than 24 hours    HAND WASHING  · Prior to handling the baby.  · Before breastfeeding or bottle feeding baby.  · After using the bathroom or changing the baby's diaper.    WOUND CARE  Ask your physician for additional care instructions.  In general:    ·  Incision:      · Keep clean and dry.    · Do NOT lift anything heavier than your baby for up to 6 weeks.    · There should not be any opening or pus.      VAGINAL CARE  · Nothing inside vagina for 6 weeks: no sexual intercourse, tampons or douching.  · Bleeding may continue for 2-4 weeks.  Amount may vary.    · Call your physician for heavy bleeding which means soaking more than 1 pad per hour    BIRTH CONTROL  · It is possible to become pregnant at any time after delivery and while  "breastfeeding.  · Plan to discuss a method of birth control with your physician at your follow up visit. visit.    DIET AND ELIMINATION  · Eating more fiber (bran cereal, fruits, and vegetables) and drinking plenty of fluids will help to avoid constipation.  · Urinary frequency after childbirth is normal.    POSTPARTUM BLUES  During the first few days after birth, you may experience a sense of the \"blues\" which may include impatience, irritability or even crying.  These feeling come and go quickly.  However, as many as 1 in 10 women experience emotional symptoms known as postpartum depression.    Postpartum depression:  May start as early as the second or third day after delivery or take several weeks or months to develop.  Symptoms of \"blues\" are present, but are more intense:  Crying spells; loss of appetite; feelings of hopelessness or loss of control; fear of touching the baby; over concern or no concern at all about the baby; little or no concern about your own appearance/caring for yourself; and/or inability to sleep or excessive sleeping.  Contact your physician if you are experiencing any of these symptoms.    Crisis Hotline:  · [place] Crisis Hotline:  6-379-UTSDTDX  Or 1-663.825.8653  · [place] Crisis Hotline:  1-667.242.6679  Or 241-246-3364    PREVENTING SHAKEN BABY:  If you are angry or stressed, PUT THE BABY IN THE CRIB, step away, take some deep breaths, and wait until you are calm to care for the baby.  DO NOT SHAKE THE BABY.  You are not alone, call a supporter for help.    · Crisis Call Center 24/7 crisis line 069-428-1146 or 1-283.432.6090  · You can also text them, text \"ANSWER\" to 671533    QUIT SMOKING/TOBACCO USE:  I understand the use of any tobacco products increases my chance of suffering from future heart disease and could cause other illnesses which may shorten my life. Quitting the use of tobacco products is the single most important thing I can do to improve my health. For further " information on smoking / tobacco cessation call a Toll Free Quit Line at 1-246.283.1278 (*[employer]) or 1-758.116.9083 (American Lung Association) or you can access the web based program at www.lungusa.org.    · [place] Tobacco Users Help Line:  (894) 816-2334       Toll Free: 1-182.854.9413  · Quit Tobacco Program [facility] (282)670-5656    DEPRESSION / SUICIDE RISK:  As you are discharged from this [facility], it is important to learn how to keep safe from harming yourself.    Recognize the warning signs:  · Abrupt changes in personality, positive or negative- including increase in energy   · Giving away possessions  · Change in eating patterns- significant weight changes-  positive or negative  · Change in sleeping patterns- unable to sleep or sleeping all the time   · Unwillingness or inability to communicate  · Depression  · Unusual sadness, discouragement and loneliness  · Talk of wanting to die  · Neglect of personal appearance   · Rebelliousness- reckless behavior  · Withdrawal from people/activities they love  · Confusion- inability to concentrate     If you or a loved one observes any of these behaviors or has concerns about self-harm, here's what you can do:  · Talk about it- your feelings and reasons for harming yourself  · Remove any means that you might use to hurt yourself (examples: pills, rope, extension cords, firearm)  · Get professional help from the community (Mental Health, Substance Abuse, psychological counseling)  · Do not be alone:Call your Safe Contact- someone whom you trust who will be there for you.  · Call your local CRISIS HOTLINE 315-9179 or 292-969-7754  · Call your local Children's Mobile Crisis Response Team [place] (869) 824-1900 or www.Vendor Registry  · Call the toll free National Suicide Prevention Hotlines   · National Suicide Prevention Lifeline 428-030-KEYM (9181)  · National Hope Line Network 800-SUICIDE  (936-5577)    DISCHARGE SURVEY:  Thank you for choosing [facility].  We hope we provided you with very good care.  You may be receiving a survey in the mail.  Please fill it out.  Your opinion is valuable to us.    ADDITIONAL EDUCATIONAL MATERIALS GIVEN TO PATIENT:        My signature on this form indicates that:  1.  I have reviewed and understand the above information  2.  My questions regarding this information have been answered to my satisfaction.  3.  I have formulated a plan with my discharge nurse to obtain my prescribed medication for home.     Normal for race

## 2020-09-09 ENCOUNTER — APPOINTMENT (OUTPATIENT)
Dept: GASTROENTEROLOGY | Facility: CLINIC | Age: 55
End: 2020-09-09
Payer: COMMERCIAL

## 2020-09-09 ENCOUNTER — INPATIENT (INPATIENT)
Facility: HOSPITAL | Age: 55
LOS: 4 days | Discharge: HOME | DRG: 440 | End: 2020-09-14
Attending: INTERNAL MEDICINE | Admitting: INTERNAL MEDICINE
Payer: COMMERCIAL

## 2020-09-09 VITALS
RESPIRATION RATE: 17 BRPM | HEIGHT: 66 IN | SYSTOLIC BLOOD PRESSURE: 136 MMHG | DIASTOLIC BLOOD PRESSURE: 96 MMHG | OXYGEN SATURATION: 98 % | TEMPERATURE: 96.4 F | HEART RATE: 87 BPM | WEIGHT: 137 LBS | BODY MASS INDEX: 22.02 KG/M2

## 2020-09-09 VITALS
TEMPERATURE: 98 F | SYSTOLIC BLOOD PRESSURE: 152 MMHG | DIASTOLIC BLOOD PRESSURE: 90 MMHG | WEIGHT: 134.92 LBS | HEIGHT: 66 IN | RESPIRATION RATE: 18 BRPM | HEART RATE: 92 BPM | OXYGEN SATURATION: 100 %

## 2020-09-09 DIAGNOSIS — R10.9 UNSPECIFIED ABDOMINAL PAIN: ICD-10-CM

## 2020-09-09 DIAGNOSIS — K21.9 GASTRO-ESOPHAGEAL REFLUX DISEASE WITHOUT ESOPHAGITIS: ICD-10-CM

## 2020-09-09 DIAGNOSIS — Q45.3 OTHER CONGENITAL MALFORMATIONS OF PANCREAS AND PANCREATIC DUCT: ICD-10-CM

## 2020-09-09 DIAGNOSIS — Z90.81 ACQUIRED ABSENCE OF SPLEEN: Chronic | ICD-10-CM

## 2020-09-09 DIAGNOSIS — K86.89 OTHER SPECIFIED DISEASES OF PANCREAS: ICD-10-CM

## 2020-09-09 DIAGNOSIS — Z90.710 ACQUIRED ABSENCE OF BOTH CERVIX AND UTERUS: Chronic | ICD-10-CM

## 2020-09-09 DIAGNOSIS — I10 ESSENTIAL (PRIMARY) HYPERTENSION: ICD-10-CM

## 2020-09-09 DIAGNOSIS — Z29.9 ENCOUNTER FOR PROPHYLACTIC MEASURES, UNSPECIFIED: ICD-10-CM

## 2020-09-09 DIAGNOSIS — R10.13 EPIGASTRIC PAIN: ICD-10-CM

## 2020-09-09 LAB
ALBUMIN SERPL ELPH-MCNC: 4.5 G/DL — SIGNIFICANT CHANGE UP (ref 3.3–5)
ALP SERPL-CCNC: 61 U/L — SIGNIFICANT CHANGE UP (ref 40–120)
ALT FLD-CCNC: 21 U/L — SIGNIFICANT CHANGE UP (ref 10–45)
ANION GAP SERPL CALC-SCNC: 16 MMOL/L — SIGNIFICANT CHANGE UP (ref 5–17)
APPEARANCE UR: ABNORMAL
AST SERPL-CCNC: 28 U/L — SIGNIFICANT CHANGE UP (ref 10–40)
BACTERIA # UR AUTO: NEGATIVE — SIGNIFICANT CHANGE UP
BASOPHILS # BLD AUTO: 0.07 K/UL — SIGNIFICANT CHANGE UP (ref 0–0.2)
BASOPHILS NFR BLD AUTO: 0.8 % — SIGNIFICANT CHANGE UP (ref 0–2)
BILIRUB SERPL-MCNC: 0.7 MG/DL — SIGNIFICANT CHANGE UP (ref 0.2–1.2)
BILIRUB UR-MCNC: NEGATIVE — SIGNIFICANT CHANGE UP
BUN SERPL-MCNC: 12 MG/DL — SIGNIFICANT CHANGE UP (ref 7–23)
CALCIUM SERPL-MCNC: 9.8 MG/DL — SIGNIFICANT CHANGE UP (ref 8.4–10.5)
CHLORIDE SERPL-SCNC: 101 MMOL/L — SIGNIFICANT CHANGE UP (ref 96–108)
CO2 SERPL-SCNC: 23 MMOL/L — SIGNIFICANT CHANGE UP (ref 22–31)
COLOR SPEC: YELLOW — SIGNIFICANT CHANGE UP
CREAT SERPL-MCNC: 0.73 MG/DL — SIGNIFICANT CHANGE UP (ref 0.5–1.3)
DIFF PNL FLD: ABNORMAL
EOSINOPHIL # BLD AUTO: 0.09 K/UL — SIGNIFICANT CHANGE UP (ref 0–0.5)
EOSINOPHIL NFR BLD AUTO: 1 % — SIGNIFICANT CHANGE UP (ref 0–6)
EPI CELLS # UR: 5 /HPF — SIGNIFICANT CHANGE UP
GAS PNL BLDV: SIGNIFICANT CHANGE UP
GLUCOSE SERPL-MCNC: 94 MG/DL — SIGNIFICANT CHANGE UP (ref 70–99)
GLUCOSE UR QL: NEGATIVE — SIGNIFICANT CHANGE UP
HCG UR QL: NEGATIVE — SIGNIFICANT CHANGE UP
HCT VFR BLD CALC: 44.6 % — SIGNIFICANT CHANGE UP (ref 34.5–45)
HGB BLD-MCNC: 14.6 G/DL — SIGNIFICANT CHANGE UP (ref 11.5–15.5)
HIV 1 & 2 AB SERPL IA.RAPID: SIGNIFICANT CHANGE UP
HYALINE CASTS # UR AUTO: 3 /LPF — HIGH (ref 0–2)
IMM GRANULOCYTES NFR BLD AUTO: 0.2 % — SIGNIFICANT CHANGE UP (ref 0–1.5)
KETONES UR-MCNC: ABNORMAL
LEUKOCYTE ESTERASE UR-ACNC: NEGATIVE — SIGNIFICANT CHANGE UP
LIDOCAIN IGE QN: 29 U/L — SIGNIFICANT CHANGE UP (ref 7–60)
LYMPHOCYTES # BLD AUTO: 2.92 K/UL — SIGNIFICANT CHANGE UP (ref 1–3.3)
LYMPHOCYTES # BLD AUTO: 32.1 % — SIGNIFICANT CHANGE UP (ref 13–44)
MCHC RBC-ENTMCNC: 31.3 PG — SIGNIFICANT CHANGE UP (ref 27–34)
MCHC RBC-ENTMCNC: 32.7 GM/DL — SIGNIFICANT CHANGE UP (ref 32–36)
MCV RBC AUTO: 95.5 FL — SIGNIFICANT CHANGE UP (ref 80–100)
MONOCYTES # BLD AUTO: 0.84 K/UL — SIGNIFICANT CHANGE UP (ref 0–0.9)
MONOCYTES NFR BLD AUTO: 9.2 % — SIGNIFICANT CHANGE UP (ref 2–14)
NEUTROPHILS # BLD AUTO: 5.17 K/UL — SIGNIFICANT CHANGE UP (ref 1.8–7.4)
NEUTROPHILS NFR BLD AUTO: 56.7 % — SIGNIFICANT CHANGE UP (ref 43–77)
NITRITE UR-MCNC: NEGATIVE — SIGNIFICANT CHANGE UP
NRBC # BLD: 0 /100 WBCS — SIGNIFICANT CHANGE UP (ref 0–0)
PH UR: 6 — SIGNIFICANT CHANGE UP (ref 5–8)
PLATELET # BLD AUTO: 393 K/UL — SIGNIFICANT CHANGE UP (ref 150–400)
POTASSIUM SERPL-MCNC: 4 MMOL/L — SIGNIFICANT CHANGE UP (ref 3.5–5.3)
POTASSIUM SERPL-SCNC: 4 MMOL/L — SIGNIFICANT CHANGE UP (ref 3.5–5.3)
PROT SERPL-MCNC: 7.5 G/DL — SIGNIFICANT CHANGE UP (ref 6–8.3)
PROT UR-MCNC: ABNORMAL
RBC # BLD: 4.67 M/UL — SIGNIFICANT CHANGE UP (ref 3.8–5.2)
RBC # FLD: 14.3 % — SIGNIFICANT CHANGE UP (ref 10.3–14.5)
RBC CASTS # UR COMP ASSIST: 2 /HPF — SIGNIFICANT CHANGE UP (ref 0–4)
SARS-COV-2 RNA SPEC QL NAA+PROBE: SIGNIFICANT CHANGE UP
SODIUM SERPL-SCNC: 140 MMOL/L — SIGNIFICANT CHANGE UP (ref 135–145)
SP GR SPEC: 1.03 — HIGH (ref 1.01–1.02)
UROBILINOGEN FLD QL: NEGATIVE — SIGNIFICANT CHANGE UP
WBC # BLD: 9.11 K/UL — SIGNIFICANT CHANGE UP (ref 3.8–10.5)
WBC # FLD AUTO: 9.11 K/UL — SIGNIFICANT CHANGE UP (ref 3.8–10.5)
WBC UR QL: 3 /HPF — SIGNIFICANT CHANGE UP (ref 0–5)

## 2020-09-09 PROCEDURE — 74177 CT ABD & PELVIS W/CONTRAST: CPT | Mod: 26

## 2020-09-09 PROCEDURE — 99285 EMERGENCY DEPT VISIT HI MDM: CPT

## 2020-09-09 PROCEDURE — 99223 1ST HOSP IP/OBS HIGH 75: CPT

## 2020-09-09 PROCEDURE — 99222 1ST HOSP IP/OBS MODERATE 55: CPT | Mod: GC

## 2020-09-09 PROCEDURE — 71045 X-RAY EXAM CHEST 1 VIEW: CPT | Mod: 26

## 2020-09-09 PROCEDURE — 99204 OFFICE O/P NEW MOD 45 MIN: CPT

## 2020-09-09 RX ORDER — ALPRAZOLAM 0.25 MG
1 TABLET ORAL
Qty: 0 | Refills: 0 | DISCHARGE

## 2020-09-09 RX ORDER — MORPHINE SULFATE 50 MG/1
2 CAPSULE, EXTENDED RELEASE ORAL EVERY 6 HOURS
Refills: 0 | Status: DISCONTINUED | OUTPATIENT
Start: 2020-09-09 | End: 2020-09-09

## 2020-09-09 RX ORDER — METOPROLOL TARTRATE 50 MG
50 TABLET ORAL AT BEDTIME
Refills: 0 | Status: DISCONTINUED | OUTPATIENT
Start: 2020-09-09 | End: 2020-09-14

## 2020-09-09 RX ORDER — PANTOPRAZOLE SODIUM 20 MG/1
40 TABLET, DELAYED RELEASE ORAL
Refills: 0 | Status: DISCONTINUED | OUTPATIENT
Start: 2020-09-09 | End: 2020-09-14

## 2020-09-09 RX ORDER — ONDANSETRON 8 MG/1
4 TABLET, FILM COATED ORAL ONCE
Refills: 0 | Status: COMPLETED | OUTPATIENT
Start: 2020-09-09 | End: 2020-09-09

## 2020-09-09 RX ORDER — SODIUM CHLORIDE 9 MG/ML
1000 INJECTION, SOLUTION INTRAVENOUS ONCE
Refills: 0 | Status: COMPLETED | OUTPATIENT
Start: 2020-09-09 | End: 2020-09-09

## 2020-09-09 RX ORDER — ONDANSETRON 8 MG/1
4 TABLET, FILM COATED ORAL EVERY 6 HOURS
Refills: 0 | Status: DISCONTINUED | OUTPATIENT
Start: 2020-09-09 | End: 2020-09-14

## 2020-09-09 RX ORDER — ENOXAPARIN SODIUM 100 MG/ML
40 INJECTION SUBCUTANEOUS DAILY
Refills: 0 | Status: DISCONTINUED | OUTPATIENT
Start: 2020-09-09 | End: 2020-09-14

## 2020-09-09 RX ORDER — INFLUENZA VIRUS VACCINE 15; 15; 15; 15 UG/.5ML; UG/.5ML; UG/.5ML; UG/.5ML
0.5 SUSPENSION INTRAMUSCULAR ONCE
Refills: 0 | Status: COMPLETED | OUTPATIENT
Start: 2020-09-09 | End: 2020-09-14

## 2020-09-09 RX ORDER — LIPASE/PROTEASE/AMYLASE 16-48-48K
2 CAPSULE,DELAYED RELEASE (ENTERIC COATED) ORAL
Refills: 0 | Status: DISCONTINUED | OUTPATIENT
Start: 2020-09-09 | End: 2020-09-14

## 2020-09-09 RX ORDER — LIPASE/PROTEASE/AMYLASE 16-48-48K
2 CAPSULE,DELAYED RELEASE (ENTERIC COATED) ORAL
Qty: 0 | Refills: 0 | DISCHARGE

## 2020-09-09 RX ORDER — AMLODIPINE BESYLATE 2.5 MG/1
5 TABLET ORAL DAILY
Refills: 0 | Status: DISCONTINUED | OUTPATIENT
Start: 2020-09-09 | End: 2020-09-14

## 2020-09-09 RX ORDER — ACETAMINOPHEN 500 MG
1000 TABLET ORAL ONCE
Refills: 0 | Status: COMPLETED | OUTPATIENT
Start: 2020-09-09 | End: 2020-09-09

## 2020-09-09 RX ORDER — SIMETHICONE 80 MG/1
80 TABLET, CHEWABLE ORAL
Refills: 0 | Status: DISCONTINUED | OUTPATIENT
Start: 2020-09-09 | End: 2020-09-14

## 2020-09-09 RX ORDER — METOPROLOL TARTRATE 50 MG
2 TABLET ORAL
Qty: 0 | Refills: 0 | DISCHARGE

## 2020-09-09 RX ORDER — HYDROMORPHONE HYDROCHLORIDE 2 MG/ML
0.5 INJECTION INTRAMUSCULAR; INTRAVENOUS; SUBCUTANEOUS EVERY 4 HOURS
Refills: 0 | Status: DISCONTINUED | OUTPATIENT
Start: 2020-09-09 | End: 2020-09-10

## 2020-09-09 RX ORDER — SODIUM CHLORIDE 9 MG/ML
1000 INJECTION, SOLUTION INTRAVENOUS
Refills: 0 | Status: DISCONTINUED | OUTPATIENT
Start: 2020-09-09 | End: 2020-09-13

## 2020-09-09 RX ORDER — OXYCODONE AND ACETAMINOPHEN 5; 325 MG/1; MG/1
1 TABLET ORAL EVERY 6 HOURS
Refills: 0 | Status: DISCONTINUED | OUTPATIENT
Start: 2020-09-09 | End: 2020-09-14

## 2020-09-09 RX ORDER — MORPHINE SULFATE 50 MG/1
6 CAPSULE, EXTENDED RELEASE ORAL ONCE
Refills: 0 | Status: DISCONTINUED | OUTPATIENT
Start: 2020-09-09 | End: 2020-09-09

## 2020-09-09 RX ADMIN — OXYCODONE AND ACETAMINOPHEN 1 TABLET(S): 5; 325 TABLET ORAL at 16:14

## 2020-09-09 RX ADMIN — SODIUM CHLORIDE 150 MILLILITER(S): 9 INJECTION, SOLUTION INTRAVENOUS at 18:49

## 2020-09-09 RX ADMIN — ONDANSETRON 4 MILLIGRAM(S): 8 TABLET, FILM COATED ORAL at 10:52

## 2020-09-09 RX ADMIN — SODIUM CHLORIDE 1000 MILLILITER(S): 9 INJECTION, SOLUTION INTRAVENOUS at 10:59

## 2020-09-09 RX ADMIN — AMLODIPINE BESYLATE 5 MILLIGRAM(S): 2.5 TABLET ORAL at 18:54

## 2020-09-09 RX ADMIN — HYDROMORPHONE HYDROCHLORIDE 0.5 MILLIGRAM(S): 2 INJECTION INTRAMUSCULAR; INTRAVENOUS; SUBCUTANEOUS at 18:53

## 2020-09-09 RX ADMIN — Medication 1000 MILLIGRAM(S): at 21:40

## 2020-09-09 RX ADMIN — Medication 400 MILLIGRAM(S): at 21:07

## 2020-09-09 RX ADMIN — MORPHINE SULFATE 6 MILLIGRAM(S): 50 CAPSULE, EXTENDED RELEASE ORAL at 10:54

## 2020-09-09 RX ADMIN — ONDANSETRON 4 MILLIGRAM(S): 8 TABLET, FILM COATED ORAL at 16:20

## 2020-09-09 RX ADMIN — ONDANSETRON 4 MILLIGRAM(S): 8 TABLET, FILM COATED ORAL at 21:16

## 2020-09-09 NOTE — H&P ADULT - NSICDXPASTSURGICALHX_GEN_ALL_CORE_FT
PAST SURGICAL HISTORY:  H/O bilateral breast implants     History of hysterectomy secondary to endometriosis    History of splenectomy     S/P hernia repair Ventral ( 1/5/2015 )    S/P hysterectomy 8 years ago- endometriosis    S/P splenectomy about 20 years ago- ITP

## 2020-09-09 NOTE — PROVIDER CONTACT NOTE (OTHER) - ACTION/TREATMENT ORDERED:
As per ALBERTINA Yates, BP stable, pt does not need ot receive BP medication IVP, IV Tylenol to be ordered for pain management, Zofran IVP given earlier to settle pt nausea. Safety maintained.

## 2020-09-09 NOTE — ED PROVIDER NOTE - CARE PLAN
Principal Discharge DX:	Abdominal pain  Secondary Diagnosis:	Unable to eat Principal Discharge DX:	Acute abdominal pain  Secondary Diagnosis:	Unable to eat

## 2020-09-09 NOTE — PROVIDER CONTACT NOTE (OTHER) - ASSESSMENT
Pt A&Ox4, resting in bed, VSS, SBP-114, HR-70s, c/o pain 8/10 and nausea with pancreatitis flare up. Unable to swallow medication d/t nausea. Last received zofran 4mg IVP @1620.

## 2020-09-09 NOTE — ED PROVIDER NOTE - ATTENDING CONTRIBUTION TO CARE
Attending MD Waldrop:   Physician assistant note reviewed and agree on plan of care and except where noted.  See HPI, PE, and MDM for details.

## 2020-09-09 NOTE — H&P ADULT - HISTORY OF PRESENT ILLNESS
55F w/ PMHx of pancreatic divisum c/b pancreatitis requiring prior stent (since removed) and recurrent abdominal pain/pancreatic insufficiency, SLE, ITP s/p splenectomy, HTN p/w abdominal pain since 8/7. Pain epigastric w/ some radiation to back, sharp and constant. She has associated nausea and lack of appetite. 2 episodes of NBNB emesis on 8/7, and none since. She reports progressive abdominal distention and burping/flatulence which she also states is typical of her prior pancreatitis. No fevers/chills/cough. No sob or chest pain. No dysuria or hematuria. No melena or hematochezia. She has not eaten any food in the last 24hrs and has had minimal PO fluid. No change in diet, or outside food at home. No other sick contacts.    In the ER CT AP performed w/ increased ductal dilatation but no acute pancreatitis. Lipase and labs unremarkable. Given pain control and fluids and admitted to medicine for further management.

## 2020-09-09 NOTE — ED ADULT NURSE NOTE - NSIMPLEMENTINTERV_GEN_ALL_ED
Implemented All Universal Safety Interventions:  [place] to call system. Call bell, personal items and telephone within reach. Instruct patient to call for assistance. Room bathroom lighting operational. Non-slip footwear when patient is off stretcher. Physically safe environment: no spills, clutter or unnecessary equipment. Stretcher in lowest position, wheels locked, appropriate side rails in place.

## 2020-09-09 NOTE — ED PROVIDER NOTE - OBJECTIVE STATEMENT
56yo F with PMH HTN, SLE, ITP s/p splenectomy, pancreas divisum c/b chronic pancreatitis, PSH hysterectomy, cholecystectomy, presenting with abdominal pain x 3 days. Pt reports pain is dull, severe, constant, located mostly in epigastric area now with total abdominal distension and discomfort. + associated nausea, nb/nb vomiting, and nb diarrhea. Last episodes of vomiting and diarrhea 3 days ago, pt has since kept to minimal PO fluids. + flatulence. Reports pain feels same as all previous episodes of pancreatitis. Reports she gets acute flare ups every 2-3 months and typically requires hospitalization. Last hospitalization was 12/2019, reports she was able to take PO dilaudid and fast at home for 2 episodes since then. Today saw a new GI 2/2 new insurance and was advised to go to hospital for admission. Has not taken anything for pain today. Denies any fever/chills, CP, SOB, melena, urinary symptoms. Drinks alcohol very rarely. Nonsmoker.   PCP Teodoro Vera (VS)  GI R Brunner (saw first time today) 56yo F with PMH HTN, SLE, ITP s/p splenectomy, pancreas divisum c/b chronic pancreatitis, PSH hysterectomy, cholecystectomy, hernia repair, presenting with abdominal pain x 3 days. Pt reports pain is dull, severe, constant, located mostly in epigastric area now with total abdominal distension and discomfort. + associated nausea, nb/nb vomiting, and nb diarrhea. Last episodes of vomiting and diarrhea 3 days ago, pt has since kept to minimal PO fluids. + flatulence. Reports pain feels same as all previous episodes of pancreatitis. Reports she gets acute flare ups every 2-3 months and typically requires hospitalization. Last hospitalization was 12/2019, reports she was able to take PO dilaudid and fast at home for 2 episodes since then. Today saw a new GI 2/2 new insurance and was advised to go to hospital for admission. Has not taken anything for pain today. Denies any fever/chills, CP, SOB, melena, urinary symptoms. Drinks alcohol very rarely. Nonsmoker.   PCP Teodoro Vera (VS)  GI R Brunner (saw first time today) 56yo F with PMH HTN, SLE, ITP s/p splenectomy, pancreas divisum c/b chronic pancreatitis, PSH hysterectomy, cholecystectomy, hernia repair, presenting with abdominal pain x 3 days. Pt reports pain is dull, severe, constant, located mostly in epigastric area now with total abdominal distension and discomfort. + associated nausea, nb/nb vomiting, and nb diarrhea. Last episodes of vomiting and diarrhea 3 days ago, pt has since kept to minimal PO fluids. + flatulence. Reports pain feels same as all previous episodes of pancreatitis. Reports she gets acute flare ups every 2-3 months and typically requires hospitalization. Last hospitalization was 12/2019, reports she was able to take PO dilaudid and fast at home for 2 episodes since then. Today saw a new GI 2/2 new insurance and was advised to go to hospital for admission. Has not taken anything for pain today. Denies any fever/chills, CP, SOB, melena, urinary symptoms. Drinks alcohol very rarely. Nonsmoker. Takes creon regularly before all meals.   PCP Teodoro Vera (VS)  GI R Brunner (saw first time today)

## 2020-09-09 NOTE — H&P ADULT - NSHPREVIEWOFSYSTEMS_GEN_ALL_CORE
Review of Systems:   CONSTITUTIONAL: No fever, weight loss  EYES: No eye pain, visual disturbances, or discharge  ENMT:  No difficulty hearing, tinnitus, vertigo; No sinus or throat pain  RESPIRATORY: No SOB. No cough, wheezing, chills or hemoptysis  CARDIOVASCULAR: No chest pain, palpitations, dizziness, or leg swelling  GASTROINTESTINAL: +epigastric pain. +nausea, vomiting. No hematemesis; +diarrhea +flatulence. No melena or hematochezia.  GENITOURINARY: No dysuria, frequency, hematuria, or incontinence  NEUROLOGICAL: No headaches, memory loss, loss of strength, numbness, or tremors  SKIN: No itching, burning, rashes, or lesions   LYMPH NODES: No enlarged glands  ENDOCRINE: No heat or cold intolerance; No hair loss  MUSCULOSKELETAL: No joint pain or swelling; No muscle, back pain  PSYCHIATRIC: No depression, anxiety, mood swings, or difficulty sleeping  HEME/LYMPH: No easy bruising, or bleeding gums

## 2020-09-09 NOTE — H&P ADULT - NSICDXFAMILYHX_GEN_ALL_CORE_FT
FAMILY HISTORY:  Family history of colon cancer  Family history of colon cancer  Family history of liver disease, liver cirrhosis    Grandparent  Still living? Unknown  Family history of breast cancer, Age at diagnosis: Age Unknown

## 2020-09-09 NOTE — HISTORY OF PRESENT ILLNESS
[de-identified] : Says PD and CP x years - pain \par \par Told to consider Peu- Steax\par \par \par On Creon \par \par Low fat \par \par No F/C/J

## 2020-09-09 NOTE — H&P ADULT - NSICDXPASTMEDICALHX_GEN_ALL_CORE_FT
PAST MEDICAL HISTORY:  Anxiety     Anxiety     Gastritis     Hernia     ITP (idiopathic thrombocytopenic purpura)     Lupus     Lupus     Pancreas divisum     Pancreatitis     Pancreatitis

## 2020-09-09 NOTE — H&P ADULT - ASSESSMENT
55F w/ PMHx of pancreatic divisum c/b pancreatitis requiring prior stent (since removed) and recurrent abdominal pain/pancreatic insufficiency, SLE, ITP s/p splenectomy, HTN p/w abdominal pain and bloating x 2 days. C/f pain 2/2 acute on chronic pancreatitis vs abdominal pain and bloating due to malabsorption vs gastroenteritis.

## 2020-09-09 NOTE — ED PROVIDER NOTE - BIRTH SEX
Female Doxepin Pregnancy And Lactation Text: This medication is Pregnancy Category C and it isn't known if it is safe during pregnancy. It is also excreted in breast milk and breast feeding isn't recommended. Acitretin Counseling:  I discussed with the patient the risks of acitretin including but not limited to hair loss, dry lips/skin/eyes, liver damage, hyperlipidemia, depression/suicidal ideation, photosensitivity.  Serious rare side effects can include but are not limited to pancreatitis, pseudotumor cerebri, bony changes, clot formation/stroke/heart attack.  Patient understands that alcohol is contraindicated since it can result in liver toxicity and significantly prolong the elimination of the drug by many years. Cosentyx Counseling:  I discussed with the patient the risks of Cosentyx including but not limited to worsening of Crohn's disease, immunosuppression, allergic reactions and infections.  The patient understands that monitoring is required including a PPD at baseline and must alert us or the primary physician if symptoms of infection or other concerning signs are noted. Tazorac Counseling:  Patient advised that medication is irritating and drying.  Patient may need to apply sparingly and wash off after an hour before eventually leaving it on overnight.  The patient verbalized understanding of the proper use and possible adverse effects of tazorac.  All of the patient's questions and concerns were addressed. Erythromycin Pregnancy And Lactation Text: This medication is Pregnancy Category B and is considered safe during pregnancy. It is also excreted in breast milk. Drysol Counseling:  I discussed with the patient the risks of drysol/aluminum chloride including but not limited to skin rash, itching, irritation, burning. Taltz Counseling: I discussed with the patient the risks of ixekizumab including but not limited to immunosuppression, serious infections, worsening of inflammatory bowel disease and drug reactions.  The patient understands that monitoring is required including a PPD at baseline and must alert us or the primary physician if symptoms of infection or other concerning signs are noted. Simponi Counseling:  I discussed with the patient the risks of golimumab including but not limited to myelosuppression, immunosuppression, autoimmune hepatitis, demyelinating diseases, lymphoma, and serious infections.  The patient understands that monitoring is required including a PPD at baseline and must alert us or the primary physician if symptoms of infection or other concerning signs are noted. Cephalexin Counseling: I counseled the patient regarding use of cephalexin as an antibiotic for prophylactic and/or therapeutic purposes. Cephalexin (commonly prescribed under brand name Keflex) is a cephalosporin antibiotic which is active against numerous classes of bacteria, including most skin bacteria. Side effects may include nausea, diarrhea, gastrointestinal upset, rash, hives, yeast infections, and in rare cases, hepatitis, kidney disease, seizures, fever, confusion, neurologic symptoms, and others. Patients with severe allergies to penicillin medications are cautioned that there is about a 10% incidence of cross-reactivity with cephalosporins. When possible, patients with penicillin allergies should use alternatives to cephalosporins for antibiotic therapy. High Dose Vitamin A Pregnancy And Lactation Text: High dose vitamin A therapy is contraindicated during pregnancy and breast feeding. Methotrexate Counseling:  Patient counseled regarding adverse effects of methotrexate including but not limited to nausea, vomiting, abnormalities in liver function tests. Patients may develop mouth sores, rash, diarrhea, and abnormalities in blood counts. The patient understands that monitoring is required including LFT's and blood counts.  There is a rare possibility of scarring of the liver and lung problems that can occur when taking methotrexate. Persistent nausea, loss of appetite, pale stools, dark urine, cough, and shortness of breath should be reported immediately. Patient advised to discontinue methotrexate treatment at least three months before attempting to become pregnant.  I discussed the need for folate supplements while taking methotrexate.  These supplements can decrease side effects during methotrexate treatment. The patient verbalized understanding of the proper use and possible adverse effects of methotrexate.  All of the patient's questions and concerns were addressed. Picato Counseling:  I discussed with the patient the risks of Picato including but not limited to erythema, scaling, itching, weeping, crusting, and pain. Bexarotene Counseling:  I discussed with the patient the risks of bexarotene including but not limited to hair loss, dry lips/skin/eyes, liver abnormalities, hyperlipidemia, pancreatitis, depression/suicidal ideation, photosensitivity, drug rash/allergic reactions, hypothyroidism, anemia, leukopenia, infection, cataracts, and teratogenicity.  Patient understands that they will need regular blood tests to check lipid profile, liver function tests, white blood cell count, thyroid function tests and pregnancy test if applicable. Protopic Pregnancy And Lactation Text: This medication is Pregnancy Category C. It is unknown if this medication is excreted in breast milk when applied topically. Xelraquelz Pregnancy And Lactation Text: This medication is Pregnancy Category D and is not considered safe during pregnancy.  The risk during breast feeding is also uncertain. Spironolactone Counseling: Patient advised regarding risks of diarrhea, abdominal pain, hyperkalemia, birth defects (for female patients), liver toxicity and renal toxicity. The patient may need blood work to monitor liver and kidney function and potassium levels while on therapy. The patient verbalized understanding of the proper use and possible adverse effects of spironolactone.  All of the patient's questions and concerns were addressed. Dupixent Pregnancy And Lactation Text: This medication likely crosses the placenta but the risk for the fetus is uncertain. This medication is excreted in breast milk. Hydroxyzine Counseling: Patient advised that the medication is sedating and not to drive a car after taking this medication.  Patient informed of potential adverse effects including but not limited to dry mouth, urinary retention, and blurry vision.  The patient verbalized understanding of the proper use and possible adverse effects of hydroxyzine.  All of the patient's questions and concerns were addressed. Acitretin Pregnancy And Lactation Text: This medication is Pregnancy Category X and should not be given to women who are pregnant or may become pregnant in the future. This medication is excreted in breast milk. Bactrim Counseling:  I discussed with the patient the risks of sulfa antibiotics including but not limited to GI upset, allergic reaction, drug rash, diarrhea, dizziness, photosensitivity, and yeast infections.  Rarely, more serious reactions can occur including but not limited to aplastic anemia, agranulocytosis, methemoglobinemia, blood dyscrasias, liver or kidney failure, lung infiltrates or desquamative/blistering drug rashes. Doxepin Counseling:  Patient advised that the medication is sedating and not to drive a car after taking this medication. Patient informed of potential adverse effects including but not limited to dry mouth, urinary retention, and blurry vision.  The patient verbalized understanding of the proper use and possible adverse effects of doxepin.  All of the patient's questions and concerns were addressed. Azathioprine Pregnancy And Lactation Text: This medication is Pregnancy Category D and isn't considered safe during pregnancy. It is unknown if this medication is excreted in breast milk. Ketoconazole Counseling:   Patient counseled regarding improving absorption with orange juice.  Adverse effects include but are not limited to breast enlargement, headache, diarrhea, nausea, upset stomach, liver function test abnormalities, taste disturbance, and stomach pain.  There is a rare possibility of liver failure that can occur when taking ketoconazole. The patient understands that monitoring of LFTs may be required, especially at baseline. The patient verbalized understanding of the proper use and possible adverse effects of ketoconazole.  All of the patient's questions and concerns were addressed. Siliq Counseling:  I discussed with the patient the risks of Siliq including but not limited to new or worsening depression, suicidal thoughts and behavior, immunosuppression, malignancy, posterior leukoencephalopathy syndrome, and serious infections.  The patient understands that monitoring is required including a PPD at baseline and must alert us or the primary physician if symptoms of infection or other concerning signs are noted. There is also a special program designed to monitor depression which is required with Siliq. 5-Fu Counseling: 5-Fluorouracil Counseling:  I discussed with the patient the risks of 5-fluorouracil including but not limited to erythema, scaling, itching, weeping, crusting, and pain. Metronidazole Pregnancy And Lactation Text: This medication is Pregnancy Category B and considered safe during pregnancy.  It is also excreted in breast milk. Colchicine Counseling:  Patient counseled regarding adverse effects including but not limited to stomach upset (nausea, vomiting, stomach pain, or diarrhea).  Patient instructed to limit alcohol consumption while taking this medication.  Colchicine may reduce blood counts especially with prolonged use.  The patient understands that monitoring of kidney function and blood counts may be required, especially at baseline. The patient verbalized understanding of the proper use and possible adverse effects of colchicine.  All of the patient's questions and concerns were addressed. Zyclara Counseling:  I discussed with the patient the risks of imiquimod including but not limited to erythema, scaling, itching, weeping, crusting, and pain.  Patient understands that the inflammatory response to imiquimod is variable from person to person and was educated regarded proper titration schedule.  If flu-like symptoms develop, patient knows to discontinue the medication and contact us. Azathioprine Counseling:  I discussed with the patient the risks of azathioprine including but not limited to myelosuppression, immunosuppression, hepatotoxicity, lymphoma, and infections.  The patient understands that monitoring is required including baseline LFTs, Creatinine, possible TPMP genotyping and weekly CBCs for the first month and then every 2 weeks thereafter.  The patient verbalized understanding of the proper use and possible adverse effects of azathioprine.  All of the patient's questions and concerns were addressed. Siliq Pregnancy And Lactation Text: The risk during pregnancy and breastfeeding is uncertain with this medication. Detail Level: Detailed Birth Control Pills Pregnancy And Lactation Text: This medication should be avoided if pregnant and for the first 30 days post-partum. Imiquimod Pregnancy And Lactation Text: This medication is Pregnancy Category C. It is unknown if this medication is excreted in breast milk. Valtrex Counseling: I discussed with the patient the risks of valacyclovir including but not limited to kidney damage, nausea, vomiting and severe allergy.  The patient understands that if the infection seems to be worsening or is not improving, they are to call. Albendazole Counseling:  I discussed with the patient the risks of albendazole including but not limited to cytopenia, kidney damage, nausea/vomiting and severe allergy.  The patient understands that this medication is being used in an off-label manner. Terbinafine Counseling: Patient counseling regarding adverse effects of terbinafine including but not limited to headache, diarrhea, rash, upset stomach, liver function test abnormalities, itching, taste/smell disturbance, nausea, abdominal pain, and flatulence.  There is a rare possibility of liver failure that can occur when taking terbinafine.  The patient understands that a baseline LFT and kidney function test may be required. The patient verbalized understanding of the proper use and possible adverse effects of terbinafine.  All of the patient's questions and concerns were addressed. Cimzia Pregnancy And Lactation Text: This medication crosses the placenta but can be considered safe in certain situations. Cimzia may be excreted in breast milk. Hydroquinone Counseling:  Patient advised that medication may result in skin irritation, lightening (hypopigmentation), dryness, and burning.  In the event of skin irritation, the patient was advised to reduce the amount of the drug applied or use it less frequently.  Rarely, spots that are treated with hydroquinone can become darker (pseudoochronosis).  Should this occur, patient instructed to stop medication and call the office. The patient verbalized understanding of the proper use and possible adverse effects of hydroquinone.  All of the patient's questions and concerns were addressed. Cimetidine Counseling:  I discussed with the patient the risks of Cimetidine including but not limited to gynecomastia, headache, diarrhea, nausea, drowsiness, arrhythmias, pancreatitis, skin rashes, psychosis, bone marrow suppression and kidney toxicity. Bexarotene Pregnancy And Lactation Text: This medication is Pregnancy Category X and should not be given to women who are pregnant or may become pregnant. This medication should not be used if you are breast feeding. Hydroxychloroquine Counseling:  I discussed with the patient that a baseline ophthalmologic exam is needed at the start of therapy and every year thereafter while on therapy. A CBC may also be warranted for monitoring.  The side effects of this medication were discussed with the patient, including but not limited to agranulocytosis, aplastic anemia, seizures, rashes, retinopathy, and liver toxicity. Patient instructed to call the office should any adverse effect occur.  The patient verbalized understanding of the proper use and possible adverse effects of Plaquenil.  All the patient's questions and concerns were addressed. Oxybutynin Counseling:  I discussed with the patient the risks of oxybutynin including but not limited to skin rash, drowsiness, dry mouth, difficulty urinating, and blurred vision. Minocycline Counseling: Patient advised regarding possible photosensitivity and discoloration of the teeth, skin, lips, tongue and gums.  Patient instructed to avoid sunlight, if possible.  When exposed to sunlight, patients should wear protective clothing, sunglasses, and sunscreen.  The patient was instructed to call the office immediately if the following severe adverse effects occur:  hearing changes, easy bruising/bleeding, severe headache, or vision changes.  The patient verbalized understanding of the proper use and possible adverse effects of minocycline.  All of the patient's questions and concerns were addressed. Humira Counseling:  I discussed with the patient the risks of adalimumab including but not limited to myelosuppression, immunosuppression, autoimmune hepatitis, demyelinating diseases, lymphoma, and serious infections.  The patient understands that monitoring is required including a PPD at baseline and must alert us or the primary physician if symptoms of infection or other concerning signs are noted. Cyclosporine Counseling:  I discussed with the patient the risks of cyclosporine including but not limited to hypertension, gingival hyperplasia,myelosuppression, immunosuppression, liver damage, kidney damage, neurotoxicity, lymphoma, and serious infections. The patient understands that monitoring is required including baseline blood pressure, CBC, CMP, lipid panel and uric acid, and then 1-2 times monthly CMP and blood pressure. Tremfya Counseling: I discussed with the patient the risks of guselkumab including but not limited to immunosuppression, serious infections, worsening of inflammatory bowel disease and drug reactions.  The patient understands that monitoring is required including a PPD at baseline and must alert us or the primary physician if symptoms of infection or other concerning signs are noted. Dupixent Counseling: I discussed with the patient the risks of dupilumab including but not limited to eye infection and irritation, cold sores, injection site reactions, worsening of asthma, allergic reactions and increased risk of parasitic infection.  Live vaccines should be avoided while taking dupilumab. Dupilumab will also interact with certain medications such as warfarin and cyclosporine. The patient understands that monitoring is required and they must alert us or the primary physician if symptoms of infection or other concerning signs are noted. Clofazimine Pregnancy And Lactation Text: This medication is Pregnancy Category C and isn't considered safe during pregnancy. It is excreted in breast milk. Rifampin Counseling: I discussed with the patient the risks of rifampin including but not limited to liver damage, kidney damage, red-orange body fluids, nausea/vomiting and severe allergy. Minoxidil Counseling: Minoxidil is a topical medication which can increase blood flow where it is applied. It is uncertain how this medication increases hair growth. Side effects are uncommon and include stinging and allergic reactions. Isotretinoin Pregnancy And Lactation Text: This medication is Pregnancy Category X and is considered extremely dangerous during pregnancy. It is unknown if it is excreted in breast milk. Xeljanz Counseling: I discussed with the patient the risks of Xeljanz therapy including increased risk of infection, liver issues, headache, diarrhea, or cold symptoms. Live vaccines should be avoided. They were instructed to call if they have any problems. Infliximab Pregnancy And Lactation Text: This medication is Pregnancy Category B and is considered safe during pregnancy. It is unknown if this medication is excreted in breast milk. Fluconazole Pregnancy And Lactation Text: This medication is Pregnancy Category C and it isn't know if it is safe during pregnancy. It is also excreted in breast milk. Arava Counseling:  Patient counseled regarding adverse effects of Arava including but not limited to nausea, vomiting, abnormalities in liver function tests. Patients may develop mouth sores, rash, diarrhea, and abnormalities in blood counts. The patient understands that monitoring is required including LFTs and blood counts.  There is a rare possibility of scarring of the liver and lung problems that can occur when taking methotrexate. Persistent nausea, loss of appetite, pale stools, dark urine, cough, and shortness of breath should be reported immediately. Patient advised to discontinue Arava treatment and consult with a physician prior to attempting conception. The patient will have to undergo a treatment to eliminate Arava from the body prior to conception. 5-Fu Pregnancy And Lactation Text: This medication is Pregnancy Category X and contraindicated in pregnancy and in women who may become pregnant. It is unknown if this medication is excreted in breast milk. Glycopyrrolate Counseling:  I discussed with the patient the risks of glycopyrrolate including but not limited to skin rash, drowsiness, dry mouth, difficulty urinating, and blurred vision. Valtrex Pregnancy And Lactation Text: this medication is Pregnancy Category B and is considered safe during pregnancy. This medication is not directly found in breast milk but it's metabolite acyclovir is present. Include Pregnancy/Lactation Warning?: No Albendazole Pregnancy And Lactation Text: This medication is Pregnancy Category C and it isn't known if it is safe during pregnancy. It is also excreted in breast milk. Topical Retinoid counseling:  Patient advised to apply a pea-sized amount only at bedtime and wait 30 minutes after washing their face before applying.  If too drying, patient may add a non-comedogenic moisturizer. The patient verbalized understanding of the proper use and possible adverse effects of retinoids.  All of the patient's questions and concerns were addressed. Elidel Counseling: Patient may experience a mild burning sensation during topical application. Elidel is not approved in children less than 2 years of age. There have been case reports of hematologic and skin malignancies in patients using topical calcineurin inhibitors although causality is questionable. Tetracycline Pregnancy And Lactation Text: This medication is Pregnancy Category D and not consider safe during pregnancy. It is also excreted in breast milk. High Dose Vitamin A Counseling: Side effects reviewed, pt to contact office should one occur. Topical Sulfur Applications Pregnancy And Lactation Text: This medication is Pregnancy Category C and has an unknown safety profile during pregnancy. It is unknown if this topical medication is excreted in breast milk. Fluconazole Counseling:  Patient counseled regarding adverse effects of fluconazole including but not limited to headache, diarrhea, nausea, upset stomach, liver function test abnormalities, taste disturbance, and stomach pain.  There is a rare possibility of liver failure that can occur when taking fluconazole.  The patient understands that monitoring of LFTs and kidney function test may be required, especially at baseline. The patient verbalized understanding of the proper use and possible adverse effects of fluconazole.  All of the patient's questions and concerns were addressed. Xolair Pregnancy And Lactation Text: This medication is Pregnancy Category B and is considered safe during pregnancy. This medication is excreted in breast milk. Clindamycin Counseling: I counseled the patient regarding use of clindamycin as an antibiotic for prophylactic and/or therapeutic purposes. Clindamycin is active against numerous classes of bacteria, including skin bacteria. Side effects may include nausea, diarrhea, gastrointestinal upset, rash, hives, yeast infections, and in rare cases, colitis. Sski Pregnancy And Lactation Text: This medication is Pregnancy Category D and isn't considered safe during pregnancy. It is excreted in breast milk. Clofazimine Counseling:  I discussed with the patient the risks of clofazimine including but not limited to skin and eye pigmentation, liver damage, nausea/vomiting, gastrointestinal bleeding and allergy. Dapsone Pregnancy And Lactation Text: This medication is Pregnancy Category C and is not considered safe during pregnancy or breast feeding. Spironolactone Pregnancy And Lactation Text: This medication can cause feminization of the male fetus and should be avoided during pregnancy. The active metabolite is also found in breast milk. Griseofulvin Pregnancy And Lactation Text: This medication is Pregnancy Category X and is known to cause serious birth defects. It is unknown if this medication is excreted in breast milk but breast feeding should be avoided. Drysol Pregnancy And Lactation Text: This medication is considered safe during pregnancy and breast feeding. Doxycycline Pregnancy And Lactation Text: This medication is Pregnancy Category D and not consider safe during pregnancy. It is also excreted in breast milk but is considered safe for shorter treatment courses. Eucrisa Pregnancy And Lactation Text: This medication has not been assigned a Pregnancy Risk Category but animal studies failed to show danger with the topical medication. It is unknown if the medication is excreted in breast milk. Glycopyrrolate Pregnancy And Lactation Text: This medication is Pregnancy Category B and is considered safe during pregnancy. It is unknown if it is excreted breast milk. Protopic Counseling: Patient may experience a mild burning sensation during topical application. Protopic is not approved in children less than 2 years of age. There have been case reports of hematologic and skin malignancies in patients using topical calcineurin inhibitors although causality is questionable. Rifampin Pregnancy And Lactation Text: This medication is Pregnancy Category C and it isn't know if it is safe during pregnancy. It is also excreted in breast milk and should not be used if you are breast feeding. SSKI Counseling:  I discussed with the patient the risks of SSKI including but not limited to thyroid abnormalities, metallic taste, GI upset, fever, headache, acne, arthralgias, paraesthesias, lymphadenopathy, easy bleeding, arrhythmias, and allergic reaction. Oxybutynin Pregnancy And Lactation Text: This medication is Pregnancy Category B and is considered safe during pregnancy. It is unknown if it is excreted in breast milk. Nsaids Pregnancy And Lactation Text: These medications are considered safe up to 30 weeks gestation. It is excreted in breast milk. Azithromycin Counseling:  I discussed with the patient the risks of azithromycin including but not limited to GI upset, allergic reaction, drug rash, diarrhea, and yeast infections. Azithromycin Pregnancy And Lactation Text: This medication is considered safe during pregnancy and is also secreted in breast milk. Ilumya Counseling: I discussed with the patient the risks of tildrakizumab including but not limited to immunosuppression, malignancy, posterior leukoencephalopathy syndrome, and serious infections.  The patient understands that monitoring is required including a PPD at baseline and must alert us or the primary physician if symptoms of infection or other concerning signs are noted. Enbrel Counseling:  I discussed with the patient the risks of etanercept including but not limited to myelosuppression, immunosuppression, autoimmune hepatitis, demyelinating diseases, lymphoma, and infections.  The patient understands that monitoring is required including a PPD at baseline and must alert us or the primary physician if symptoms of infection or other concerning signs are noted. Itraconazole Counseling:  I discussed with the patient the risks of itraconazole including but not limited to liver damage, nausea/vomiting, neuropathy, and severe allergy.  The patient understands that this medication is best absorbed when taken with acidic beverages such as non-diet cola or ginger ale.  The patient understands that monitoring is required including baseline LFTs and repeat LFTs at intervals.  The patient understands that they are to contact us or the primary physician if concerning signs are noted. Birth Control Pills Counseling: Birth Control Pill Counseling: I discussed with the patient the potential side effects of OCPs including but not limited to increased risk of stroke, heart attack, thrombophlebitis, deep venous thrombosis, hepatic adenomas, breast changes, GI upset, headaches, and depression.  The patient verbalized understanding of the proper use and possible adverse effects of OCPs. All of the patient's questions and concerns were addressed. Odomzo Counseling- I discussed with the patient the risks of Odomzo including but not limited to nausea, vomiting, diarrhea, constipation, weight loss, changes in the sense of taste, decreased appetite, muscle spasms, and hair loss.  The patient verbalized understanding of the proper use and possible adverse effects of Odomzo.  All of the patient's questions and concerns were addressed. Otezla Counseling: The side effects of Otezla were discussed with the patient, including but not limited to worsening or new depression, weight loss, diarrhea, nausea, upper respiratory tract infection, and headache. Patient instructed to call the office should any adverse effect occur.  The patient verbalized understanding of the proper use and possible adverse effects of Otezla.  All the patient's questions and concerns were addressed. Cephalexin Pregnancy And Lactation Text: This medication is Pregnancy Category B and considered safe during pregnancy.  It is also excreted in breast milk but can be used safely for shorter doses. Hydroxyzine Pregnancy And Lactation Text: This medication is not safe during pregnancy and should not be taken. It is also excreted in breast milk and breast feeding isn't recommended. Ivermectin Counseling:  Patient instructed to take medication on an empty stomach with a full glass of water.  Patient informed of potential adverse effects including but not limited to nausea, diarrhea, dizziness, itching, and swelling of the extremities or lymph nodes.  The patient verbalized understanding of the proper use and possible adverse effects of ivermectin.  All of the patient's questions and concerns were addressed. Quinolones Counseling:  I discussed with the patient the risks of fluoroquinolones including but not limited to GI upset, allergic reaction, drug rash, diarrhea, dizziness, photosensitivity, yeast infections, liver function test abnormalities, tendonitis/tendon rupture. Erythromycin Counseling:  I discussed with the patient the risks of erythromycin including but not limited to GI upset, allergic reaction, drug rash, diarrhea, increase in liver enzymes, and yeast infections. Imiquimod Counseling:  I discussed with the patient the risks of imiquimod including but not limited to erythema, scaling, itching, weeping, crusting, and pain.  Patient understands that the inflammatory response to imiquimod is variable from person to person and was educated regarded proper titration schedule.  If flu-like symptoms develop, patient knows to discontinue the medication and contact us. Cyclophosphamide Pregnancy And Lactation Text: This medication is Pregnancy Category D and it isn't considered safe during pregnancy. This medication is excreted in breast milk. Cellcept Counseling:  I discussed with the patient the risks of mycophenolate mofetil including but not limited to infection/immunosuppression, GI upset, hypokalemia, hypercholesterolemia, bone marrow suppression, lymphoproliferative disorders, malignancy, GI ulceration/bleed/perforation, colitis, interstitial lung disease, kidney failure, progressive multifocal leukoencephalopathy, and birth defects.  The patient understands that monitoring is required including a baseline creatinine and regular CBC testing. In addition, patient must alert us immediately if symptoms of infection or other concerning signs are noted. Solaraze Counseling:  I discussed with the patient the risks of Solaraze including but not limited to erythema, scaling, itching, weeping, crusting, and pain. Griseofulvin Counseling:  I discussed with the patient the risks of griseofulvin including but not limited to photosensitivity, cytopenia, liver damage, nausea/vomiting and severe allergy.  The patient understands that this medication is best absorbed when taken with a fatty meal (e.g., ice cream or french fries). Infliximab Counseling:  I discussed with the patient the risks of infliximab including but not limited to myelosuppression, immunosuppression, autoimmune hepatitis, demyelinating diseases, lymphoma, and serious infections.  The patient understands that monitoring is required including a PPD at baseline and must alert us or the primary physician if symptoms of infection or other concerning signs are noted. Eucrisa Counseling: Patient may experience a mild burning sensation during topical application. Eucrisa is not approved in children less than 2 years of age. Gabapentin Counseling: I discussed with the patient the risks of gabapentin including but not limited to dizziness, somnolence, fatigue and ataxia. Metronidazole Counseling:  I discussed with the patient the risks of metronidazole including but not limited to seizures, nausea/vomiting, a metallic taste in the mouth, nausea/vomiting and severe allergy. Isotretinoin Counseling: Patient should get monthly blood tests, not donate blood, not drive at night if vision affected, not share medication, and not undergo elective surgery for 6 months after tx completed. Side effects reviewed, pt to contact office should one occur. Tetracycline Counseling: Patient counseled regarding possible photosensitivity and increased risk for sunburn.  Patient instructed to avoid sunlight, if possible.  When exposed to sunlight, patients should wear protective clothing, sunglasses, and sunscreen.  The patient was instructed to call the office immediately if the following severe adverse effects occur:  hearing changes, easy bruising/bleeding, severe headache, or vision changes.  The patient verbalized understanding of the proper use and possible adverse effects of tetracycline.  All of the patient's questions and concerns were addressed. Patient understands to avoid pregnancy while on therapy due to potential birth defects. Stelara Counseling:  I discussed with the patient the risks of ustekinumab including but not limited to immunosuppression, malignancy, posterior leukoencephalopathy syndrome, and serious infections.  The patient understands that monitoring is required including a PPD at baseline and must alert us or the primary physician if symptoms of infection or other concerning signs are noted. Bactrim Pregnancy And Lactation Text: This medication is Pregnancy Category D and is known to cause fetal risk.  It is also excreted in breast milk. Thalidomide Counseling: I discussed with the patient the risks of thalidomide including but not limited to birth defects, anxiety, weakness, chest pain, dizziness, cough and severe allergy. Solaraze Pregnancy And Lactation Text: This medication is Pregnancy Category B and is considered safe. There is some data to suggest avoiding during the third trimester. It is unknown if this medication is excreted in breast milk. Cimetidine Pregnancy And Lactation Text: This medication is Pregnancy Category B and is considered safe during pregnancy. It is also excreted in breast milk and breast feeding isn't recommended. Erivedge Pregnancy And Lactation Text: This medication is Pregnancy Category X and is absolutely contraindicated during pregnancy. It is unknown if it is excreted in breast milk. Tazorac Pregnancy And Lactation Text: This medication is not safe during pregnancy. It is unknown if this medication is excreted in breast milk. Carac Counseling:  I discussed with the patient the risks of Carac including but not limited to erythema, scaling, itching, weeping, crusting, and pain. Prednisone Pregnancy And Lactation Text: This medication is Pregnancy Category C and it isn't know if it is safe during pregnancy. This medication is excreted in breast milk. Rituxan Counseling:  I discussed with the patient the risks of Rituxan infusions. Side effects can include infusion reactions, severe drug rashes including mucocutaneous reactions, reactivation of latent hepatitis and other infections and rarely progressive multifocal leukoencephalopathy.  All of the patient's questions and concerns were addressed. Nsaids Counseling: NSAID Counseling: I discussed with the patient that NSAIDs should be taken with food. Prolonged use of NSAIDs can result in the development of stomach ulcers.  Patient advised to stop taking NSAIDs if abdominal pain occurs.  The patient verbalized understanding of the proper use and possible adverse effects of NSAIDs.  All of the patient's questions and concerns were addressed. Benzoyl Peroxide Counseling: Patient counseled that medicine may cause skin irritation and bleach clothing.  In the event of skin irritation, the patient was advised to reduce the amount of the drug applied or use it less frequently.   The patient verbalized understanding of the proper use and possible adverse effects of benzoyl peroxide.  All of the patient's questions and concerns were addressed. Benzoyl Peroxide Pregnancy And Lactation Text: This medication is Pregnancy Category C. It is unknown if benzoyl peroxide is excreted in breast milk. Rituxan Pregnancy And Lactation Text: This medication is Pregnancy Category C and it isn't know if it is safe during pregnancy. It is unknown if this medication is excreted in breast milk but similar antibodies are known to be excreted. Ketoconazole Pregnancy And Lactation Text: This medication is Pregnancy Category C and it isn't know if it is safe during pregnancy. It is also excreted in breast milk and breast feeding isn't recommended. Topical Clindamycin Counseling: Patient counseled that this medication may cause skin irritation or allergic reactions.  In the event of skin irritation, the patient was advised to reduce the amount of the drug applied or use it less frequently.   The patient verbalized understanding of the proper use and possible adverse effects of clindamycin.  All of the patient's questions and concerns were addressed. Doxycycline Counseling:  Patient counseled regarding possible photosensitivity and increased risk for sunburn.  Patient instructed to avoid sunlight, if possible.  When exposed to sunlight, patients should wear protective clothing, sunglasses, and sunscreen.  The patient was instructed to call the office immediately if the following severe adverse effects occur:  hearing changes, easy bruising/bleeding, severe headache, or vision changes.  The patient verbalized understanding of the proper use and possible adverse effects of doxycycline.  All of the patient's questions and concerns were addressed. Clindamycin Pregnancy And Lactation Text: This medication can be used in pregnancy if certain situations. Clindamycin is also present in breast milk. Methotrexate Pregnancy And Lactation Text: This medication is Pregnancy Category X and is known to cause fetal harm. This medication is excreted in breast milk. Cimzia Counseling:  I discussed with the patient the risks of Cimzia including but not limited to immunosuppression, allergic reactions and infections.  The patient understands that monitoring is required including a PPD at baseline and must alert us or the primary physician if symptoms of infection or other concerning signs are noted. Dapsone Counseling: I discussed with the patient the risks of dapsone including but not limited to hemolytic anemia, agranulocytosis, rashes, methemoglobinemia, kidney failure, peripheral neuropathy, headaches, GI upset, and liver toxicity.  Patients who start dapsone require monitoring including baseline LFTs and weekly CBCs for the first month, then every month thereafter.  The patient verbalized understanding of the proper use and possible adverse effects of dapsone.  All of the patient's questions and concerns were addressed. Xolair Counseling:  Patient informed of potential adverse effects including but not limited to fever, muscle aches, rash and allergic reactions.  The patient verbalized understanding of the proper use and possible adverse effects of Xolair.  All of the patient's questions and concerns were addressed. Otezla Pregnancy And Lactation Text: This medication is Pregnancy Category C and it isn't known if it is safe during pregnancy. It is unknown if it is excreted in breast milk. Prednisone Counseling:  I discussed with the patient the risks of prolonged use of prednisone including but not limited to weight gain, insomnia, osteoporosis, mood changes, diabetes, susceptibility to infection, glaucoma and high blood pressure.  In cases where prednisone use is prolonged, patients should be monitored with blood pressure checks, serum glucose levels and an eye exam.  Additionally, the patient may need to be placed on GI prophylaxis, PCP prophylaxis, and calcium and vitamin D supplementation and/or a bisphosphonate.  The patient verbalized understanding of the proper use and the possible adverse effects of prednisone.  All of the patient's questions and concerns were addressed. Erivedge Counseling- I discussed with the patient the risks of Erivedge including but not limited to nausea, vomiting, diarrhea, constipation, weight loss, changes in the sense of taste, decreased appetite, muscle spasms, and hair loss.  The patient verbalized understanding of the proper use and possible adverse effects of Erivedge.  All of the patient's questions and concerns were addressed. Cyclophosphamide Counseling:  I discussed with the patient the risks of cyclophosphamide including but not limited to hair loss, hormonal abnormalities, decreased fertility, abdominal pain, diarrhea, nausea and vomiting, bone marrow suppression and infection. The patient understands that monitoring is required while taking this medication. Topical Sulfur Applications Counseling: Topical Sulfur Counseling: Patient counseled that this medication may cause skin irritation or allergic reactions.  In the event of skin irritation, the patient was advised to reduce the amount of the drug applied or use it less frequently.   The patient verbalized understanding of the proper use and possible adverse effects of topical sulfur application.  All of the patient's questions and concerns were addressed. Hydroxychloroquine Pregnancy And Lactation Text: This medication has been shown to cause fetal harm but it isn't assigned a Pregnancy Risk Category. There are small amounts excreted in breast milk. Skyrizi Counseling: I discussed with the patient the risks of risankizumab-rzaa including but not limited to immunosuppression, and serious infections.  The patient understands that monitoring is required including a PPD at baseline and must alert us or the primary physician if symptoms of infection or other concerning signs are noted.

## 2020-09-09 NOTE — H&P ADULT - NSHPPHYSICALEXAM_GEN_ALL_CORE
Vital Signs Last 24 Hrs  T(C): 36.8 (09 Sep 2020 10:36), Max: 36.8 (09 Sep 2020 10:36)  T(F): 98.3 (09 Sep 2020 10:36), Max: 98.3 (09 Sep 2020 10:36)  HR: 73 (09 Sep 2020 13:50) (73 - 92)  BP: 124/97 (09 Sep 2020 13:50) (124/97 - 152/90)  BP(mean): --  RR: 18 (09 Sep 2020 13:50) (16 - 18)  SpO2: 99% (09 Sep 2020 13:50) (99% - 100%)    PHYSICAL EXAM:  CONSTITUTIONAL: NAD, well-developed, well-groomed  EYES: PERRLA; conjunctiva and sclera clear  ENMT: Moist oral mucosa, no pharyngeal injection or exudates; normal dentition  NECK: Supple, no palpable masses; no thyromegaly  RESPIRATORY: Normal respiratory effort; lungs are clear to auscultation bilaterally  CARDIOVASCULAR: Regular rate and rhythm, normal S1 and S2, no murmur/rub/gallop; No lower extremity edema; Peripheral pulses are 2+ bilaterally  ABDOMEN: +tender to palpation in epigastrium, +distention,  +active bowel sounds, no rebound/guarding  MUSCULOSKELETAL:  Normal gait; no clubbing or cyanosis of digits; no joint swelling or tenderness to palpation  PSYCH: A+O to person, place, and time; affect appropriate  NEUROLOGY: CN 2-12 are intact and symmetric; no gross sensory deficits   SKIN: No rashes; no palpable lesions

## 2020-09-09 NOTE — ED PROVIDER NOTE - ALLERGIC/IMMUNOLOGIC [+], MLM
SUBJECTIVE       Lorena Brooks is a 42 year old  female with a PMH significant for:     Patient Active Problem List   Diagnosis     Congenital anomaly of cerebrovascular system     Dizziness and giddiness     Headache     Health Care Home     Seizure disorder (H)     Epilepsy (H)     S/P laparoscopic cholecystectomy     Tremor     She presents for UPT. She thinks she might be pregnant. She has had bilateral breast soreness for the past 2 weeks. She has had no nipple discharge and has felt no breast lumps. No FH of breast cancer.  Patient's last menstrual period was 2017 (approximate).  Her periods are usually regular, occurring every 30 days. She has had increased appetite lately. No N/V, mood changes, fatigue. Unsure if she has had any weight gain. She is a . History of C/S with last baby, about 3 years ago. She last had unprotected intercourse last night with her daughter's father - whom she has been seeing recently, and they have been having unprotected intercourse several times over the past 2 weeks. She has no other partners.  She has no concerns for STDs. She was not trying to get pregnant but would keep the baby if she is pregnant. She is also interested in the Depo shot for contraception if not pregnant. She has been on this before.     She is a current smoker. She is not feeling ready to quit smoking at this time, as she does it for stress relief. Did encourage her to quit. Offered assistance with smoking cessation, but she declines this.     PMH, Medications and Allergies were reviewed and updated as needed.        REVIEW OF SYSTEMS     Pertinent positives and negatives noted in HPI.         OBJECTIVE     Vitals:    17 1616   BP: 118/79   Pulse: 66   Temp: 98.8  F (37.1  C)   TempSrc: Oral   SpO2: 97%   Weight: 196 lb (88.9 kg)     Body mass index is 31.4 kg/(m^2).    General: Alert, NAD  CV: RRR w/o m/r/g  Pulm: CTAB without wheezes or crackles, no increased WOB  Breast exam: normal  appearing breasts, no nipple retraction or skin dimpling, no palpable breast masses or axillary lymphadenopathy  Psych: Pleasant mood, normal affect      Results for orders placed or performed in visit on 06/19/17 (from the past 24 hour(s))   HCG Qualitative Urine (UPT)  ([facility])   Result Value Ref Range    HCG Qual Urine NEGATIVE Negative           ASSESSMENT AND PLAN       Breast tenderness:  Bilateral breast tenderness. No palpable masses or axillary lymphadenopathy. No nipple discharge. UPT negative today, however did recommend repeating a UPT in 2 weeks if period has not started before then, given recent unprotected intercourse yesterday. She would like the Depo shot for contraception- will need to return at the start of next menses or after 2 weeks of no unprotected intercourse and another negative UPT. Did provide pt with free condoms.   -     HCG Qualitative Urine (UPT)  ([facility])    Tobacco use disorder  -     SMOKING CESSATION COUNSELING 3-10 MIN    Encounter for contraceptive management, unspecified contraceptive encounter type: See above          RTC during next menses for Depo shot or sooner if develops new or worsening symptoms.    Patient's plan of care was discussed with Dr. Malagon.    Amy Denise MD PGY-3  Pager #: [phone]       hx of SLE

## 2020-09-09 NOTE — H&P ADULT - PROBLEM SELECTOR PLAN 1
-suspect chronic pancreatitis, vs gas pain from malabsorption vs gastroenteritis  -NPO for now pending GI eval  -GI consult given new ductal dilatation on CT  -consider MRCP to confirm no acute pancreatitis vs obstruction  -GI pcr if diarrhea  -CXR, ECG to complete workup  -low suspicion for cholangitis  -c/w LR@150 cc/hr  -dilaudid prn for severe pain, oxy for moderate  -zofran prn for nausea -suspect chronic pancreatitis, vs gas pain from malabsorption vs gastroenteritis  -NPO for now pending GI eval  -GI consult given new ductal dilatation on CT  -consider MRCP to confirm no acute pancreatitis vs obstruction  -GI pcr if diarrhea  -CXR, ECG to complete workup  -low suspicion for cholangitis  -c/w LR@150 cc/hr  -dilaudid prn for severe pain, oxy for moderate  -zofran prn for nausea  -start simethicone for gas

## 2020-09-09 NOTE — ED ADULT NURSE NOTE - OBJECTIVE STATEMENT
55F comes to ED c/o abdominal pain. Patient states she has history of chronic pancreatitis due to congenital defect. States she has diffuse abdominal pain worse in epigastric region. A&Ox3 in no distress. States she also is experiencing N/V/D. Abdomen is tender diffusely, urine appears dark, clear lungs, no evidence of trauma. States "this happens a lot". Will continue to monitor .

## 2020-09-09 NOTE — ED ADULT NURSE NOTE - CHPI ED NUR SYMPTOMS NEG
no blood in stool/no vomiting/no chills/no hematuria/no burning urination/no dysuria/no fever/no abdominal distension

## 2020-09-09 NOTE — CONSULT NOTE ADULT - ASSESSMENT
55 F hx of pancreatic divisum complicated by chronic pancreatitis s/p prior stent (since removed), recurrent episodes of abdominal pain Q3 months, SLE, ITP s/p splenectomy and Hypertension presents with abdominal pain.     Impression:  # Abdominal Pain: Suspect related to acute on chronic pancreatitis given history and consistent symptoms  # New biliary tree dilated (Intra + Extrahepatic): Unclear etiology. Suspect secondary to biliary stricture given known chronic pancreatitis. Less likely stones given cholecystectomy. Differential also includes masses given dilated PD and CBD  # Dilated PD: Likely related to chronic pancreatitis  # SLE  # ITP s/p splenectomy    Recommendation    Angélica Alejo MD  Gastroenterology Fellow  [phone]  [phone]  Available on Microsoft Teams  Please page on call fellow on weekends and after 5pm on weekdays: [phone] 55 F hx of pancreatic divisum complicated by chronic pancreatitis s/p prior stent (since removed), recurrent episodes of abdominal pain Q3 months, SLE, ITP s/p splenectomy and Hypertension presents with abdominal pain.     Impression:  # Abdominal Pain: Suspect related to acute on chronic pancreatitis given history and consistent symptoms  # New biliary tree dilated (Intra + Extrahepatic): Unclear etiology. Suspect secondary to biliary stricture given known chronic pancreatitis. Less likely stones given cholecystectomy. Differential also includes masses given dilated PD and CBD  # Dilated PD: Likely related to chronic pancreatitis  # SLE  # ITP s/p splenectomy    Recommendation  - NPO @ [facility] for EUS tomorrow  - Obtain MRCP  - Pain control\  - Check Coags  - Supportive care per primary team    Angélica Alejo MD  Gastroenterology Fellow  [phone]  Available on Microsoft Teams  Please page on call fellow on weekends and after 5pm on weekdays: [phone]    Angélica Alejo MD  Gastroenterology Fellow  [phone]  Available on Microsoft Teams  Please page on call fellow on weekends and after 5pm on weekdays: [phone]

## 2020-09-09 NOTE — H&P ADULT - NSHPLABSRESULTS_GEN_ALL_CORE
LABS:                         14.6   9.11  )-----------( 393      ( 09 Sep 2020 10:55 )             44.6         140  |  101  |  12  ----------------------------<  94  4.0   |  23  |  0.73    Ca    9.8      09 Sep 2020 10:55    TPro  7.5  /  Alb  4.5  /  TBili  0.7  /  DBili  x   /  AST  28  /  ALT  21  /  AlkPhos  61            Urinalysis Basic - ( 09 Sep 2020 12:54 )    Color: Yellow / Appearance: Slightly Turbid / S.031 / pH: x  Gluc: x / Ketone: Moderate  / Bili: Negative / Urobili: Negative   Blood: x / Protein: 30 mg/dL / Nitrite: Negative   Leuk Esterase: Negative / RBC: 2 /hpf / WBC 3 /HPF   Sq Epi: x / Non Sq Epi: 5 /hpf / Bacteria: Negative      CT AP  < from: CT Abdomen and Pelvis w/ IV Cont (20 @ 11:55) >    IMPRESSION:  No CT evidence of pancreatitis.    Pancreas divisum.    Mild dilatation of the main pancreatic duct.    Intra and extrahepatic biliary ductal dilatation, slightly increased.    A cystic structure in the gallbladder fossa may represent a dilated cystic duct remnant. MRI/MRCP may be helpful to further evaluate these findings.    Mild wall thickening versus underdistention of the ascending and transverse colon.    < end of copied text >        Records reviewed from prior hospitalization.  Labs reviewed remarkable for normal wbc, normal renal function  CT AP reviewed - no acute pancreatitis, but increased ductal dilatation  EKG pending  CXR pending LABS:                         14.6   9.11  )-----------( 393      ( 09 Sep 2020 10:55 )             44.6         140  |  101  |  12  ----------------------------<  94  4.0   |  23  |  0.73    Ca    9.8      09 Sep 2020 10:55    TPro  7.5  /  Alb  4.5  /  TBili  0.7  /  DBili  x   /  AST  28  /  ALT  21  /  AlkPhos  61            Urinalysis Basic - ( 09 Sep 2020 12:54 )    Color: Yellow / Appearance: Slightly Turbid / S.031 / pH: x  Gluc: x / Ketone: Moderate  / Bili: Negative / Urobili: Negative   Blood: x / Protein: 30 mg/dL / Nitrite: Negative   Leuk Esterase: Negative / RBC: 2 /hpf / WBC 3 /HPF   Sq Epi: x / Non Sq Epi: 5 /hpf / Bacteria: Negative      CT AP  < from: CT Abdomen and Pelvis w/ IV Cont (20 @ 11:55) >    IMPRESSION:  No CT evidence of pancreatitis.    Pancreas divisum.    Mild dilatation of the main pancreatic duct.    Intra and extrahepatic biliary ductal dilatation, slightly increased.    A cystic structure in the gallbladder fossa may represent a dilated cystic duct remnant. MRI/MRCP may be helpful to further evaluate these findings.    Mild wall thickening versus underdistention of the ascending and transverse colon.    < end of copied text >        Records reviewed from prior hospitalization.  Labs reviewed remarkable for normal wbc, normal renal function  CT AP reviewed - no acute pancreatitis, but increased ductal dilatation  EKG pending  CXR personally reviewed - clear lungs

## 2020-09-09 NOTE — PHYSICAL EXAM
[General Appearance - In No Acute Distress] : in no acute distress [General Appearance - Alert] : alert [Sclera] : the sclera and conjunctiva were normal [PERRL With Normal Accommodation] : pupils were equal in size, round, and reactive to light [Extraocular Movements] : extraocular movements were intact [Oropharynx] : the oropharynx was normal [Neck Appearance] : the appearance of the neck was normal [Outer Ear] : the ears and nose were normal in appearance [Jugular Venous Distention Increased] : there was no jugular-venous distention [Neck Cervical Mass (___cm)] : no neck mass was observed [Thyroid Diffuse Enlargement] : the thyroid was not enlarged [Thyroid Nodule] : there were no palpable thyroid nodules [Heart Sounds] : normal S1 and S2 [Auscultation Breath Sounds / Voice Sounds] : lungs were clear to auscultation bilaterally [Heart Rate And Rhythm] : heart rate was normal and rhythm regular [Heart Sounds Pericardial Friction Rub] : no pericardial rub [Murmurs] : no murmurs [Heart Sounds Gallop] : no gallops [Soft, Nontender] : the abdomen was soft and nontender [Epigastric] : in the epigastric area [Normal] : normal [Suprapubic] : in the suprapubic area [Periumbilical] : in the periumbilical area [RUQ] : in the right upper quadrant [RLQ] : in the right lower quadrant [LUQ] : in the left upper quadrant [LLQ] : in the left lower quadrant [Firm] : not firm [Rigid] : not rigid [Rebound] : no rebound [Guarding] : no guarding [No Mass] : no masses were palpated [No HSM] : no hepatosplenomegaly noted [Abnormal Walk] : normal gait [Motor Tone] : muscle strength and tone were normal [Nail Clubbing] : no clubbing  or cyanosis of the fingernails [Musculoskeletal - Swelling] : no joint swelling seen [] : no rash [Skin Color & Pigmentation] : normal skin color and pigmentation [Skin Turgor] : normal skin turgor [Deep Tendon Reflexes (DTR)] : deep tendon reflexes were 2+ and symmetric [No Focal Deficits] : no focal deficits [Sensation] : the sensory exam was normal to light touch and pinprick [Impaired Insight] : insight and judgment were intact [Oriented To Time, Place, And Person] : oriented to person, place, and time [Affect] : the affect was normal

## 2020-09-10 LAB
ANION GAP SERPL CALC-SCNC: 17 MMOL/L — SIGNIFICANT CHANGE UP (ref 5–17)
APTT BLD: 28.9 SEC — SIGNIFICANT CHANGE UP (ref 27.5–35.5)
BUN SERPL-MCNC: 13 MG/DL — SIGNIFICANT CHANGE UP (ref 7–23)
CALCIUM SERPL-MCNC: 9.2 MG/DL — SIGNIFICANT CHANGE UP (ref 8.4–10.5)
CHLORIDE SERPL-SCNC: 99 MMOL/L — SIGNIFICANT CHANGE UP (ref 96–108)
CO2 SERPL-SCNC: 20 MMOL/L — LOW (ref 22–31)
CREAT SERPL-MCNC: 0.73 MG/DL — SIGNIFICANT CHANGE UP (ref 0.5–1.3)
GLUCOSE SERPL-MCNC: 60 MG/DL — LOW (ref 70–99)
HCT VFR BLD CALC: 38.9 % — SIGNIFICANT CHANGE UP (ref 34.5–45)
HGB BLD-MCNC: 12.6 G/DL — SIGNIFICANT CHANGE UP (ref 11.5–15.5)
INR BLD: 1.06 RATIO — SIGNIFICANT CHANGE UP (ref 0.88–1.16)
MCHC RBC-ENTMCNC: 31.3 PG — SIGNIFICANT CHANGE UP (ref 27–34)
MCHC RBC-ENTMCNC: 32.4 GM/DL — SIGNIFICANT CHANGE UP (ref 32–36)
MCV RBC AUTO: 96.8 FL — SIGNIFICANT CHANGE UP (ref 80–100)
NRBC # BLD: 0 /100 WBCS — SIGNIFICANT CHANGE UP (ref 0–0)
PLATELET # BLD AUTO: 338 K/UL — SIGNIFICANT CHANGE UP (ref 150–400)
POTASSIUM SERPL-MCNC: 4.7 MMOL/L — SIGNIFICANT CHANGE UP (ref 3.5–5.3)
POTASSIUM SERPL-SCNC: 4.7 MMOL/L — SIGNIFICANT CHANGE UP (ref 3.5–5.3)
PROTHROM AB SERPL-ACNC: 12.6 SEC — SIGNIFICANT CHANGE UP (ref 10.6–13.6)
RBC # BLD: 4.02 M/UL — SIGNIFICANT CHANGE UP (ref 3.8–5.2)
RBC # FLD: 14.1 % — SIGNIFICANT CHANGE UP (ref 10.3–14.5)
SODIUM SERPL-SCNC: 136 MMOL/L — SIGNIFICANT CHANGE UP (ref 135–145)
WBC # BLD: 9 K/UL — SIGNIFICANT CHANGE UP (ref 3.8–10.5)
WBC # FLD AUTO: 9 K/UL — SIGNIFICANT CHANGE UP (ref 3.8–10.5)

## 2020-09-10 PROCEDURE — 99222 1ST HOSP IP/OBS MODERATE 55: CPT | Mod: GC

## 2020-09-10 PROCEDURE — 43259 EGD US EXAM DUODENUM/JEJUNUM: CPT | Mod: GC

## 2020-09-10 PROCEDURE — 74181 MRI ABDOMEN W/O CONTRAST: CPT | Mod: 26

## 2020-09-10 RX ORDER — SODIUM CHLORIDE 9 MG/ML
1000 INJECTION INTRAMUSCULAR; INTRAVENOUS; SUBCUTANEOUS
Refills: 0 | Status: DISCONTINUED | OUTPATIENT
Start: 2020-09-10 | End: 2020-09-14

## 2020-09-10 RX ORDER — HYDROMORPHONE HYDROCHLORIDE 2 MG/ML
1 INJECTION INTRAMUSCULAR; INTRAVENOUS; SUBCUTANEOUS EVERY 4 HOURS
Refills: 0 | Status: DISCONTINUED | OUTPATIENT
Start: 2020-09-10 | End: 2020-09-12

## 2020-09-10 RX ADMIN — Medication 30 MILLILITER(S): at 00:15

## 2020-09-10 RX ADMIN — HYDROMORPHONE HYDROCHLORIDE 0.5 MILLIGRAM(S): 2 INJECTION INTRAMUSCULAR; INTRAVENOUS; SUBCUTANEOUS at 09:30

## 2020-09-10 RX ADMIN — Medication 50 MILLIGRAM(S): at 21:52

## 2020-09-10 RX ADMIN — AMLODIPINE BESYLATE 5 MILLIGRAM(S): 2.5 TABLET ORAL at 06:00

## 2020-09-10 RX ADMIN — HYDROMORPHONE HYDROCHLORIDE 0.5 MILLIGRAM(S): 2 INJECTION INTRAMUSCULAR; INTRAVENOUS; SUBCUTANEOUS at 00:46

## 2020-09-10 RX ADMIN — HYDROMORPHONE HYDROCHLORIDE 0.5 MILLIGRAM(S): 2 INJECTION INTRAMUSCULAR; INTRAVENOUS; SUBCUTANEOUS at 05:30

## 2020-09-10 RX ADMIN — HYDROMORPHONE HYDROCHLORIDE 1 MILLIGRAM(S): 2 INJECTION INTRAMUSCULAR; INTRAVENOUS; SUBCUTANEOUS at 13:45

## 2020-09-10 RX ADMIN — HYDROMORPHONE HYDROCHLORIDE 1 MILLIGRAM(S): 2 INJECTION INTRAMUSCULAR; INTRAVENOUS; SUBCUTANEOUS at 13:29

## 2020-09-10 RX ADMIN — SIMETHICONE 80 MILLIGRAM(S): 80 TABLET, CHEWABLE ORAL at 05:01

## 2020-09-10 RX ADMIN — SIMETHICONE 80 MILLIGRAM(S): 80 TABLET, CHEWABLE ORAL at 18:16

## 2020-09-10 RX ADMIN — ONDANSETRON 4 MILLIGRAM(S): 8 TABLET, FILM COATED ORAL at 09:16

## 2020-09-10 RX ADMIN — HYDROMORPHONE HYDROCHLORIDE 1 MILLIGRAM(S): 2 INJECTION INTRAMUSCULAR; INTRAVENOUS; SUBCUTANEOUS at 23:15

## 2020-09-10 RX ADMIN — HYDROMORPHONE HYDROCHLORIDE 0.5 MILLIGRAM(S): 2 INJECTION INTRAMUSCULAR; INTRAVENOUS; SUBCUTANEOUS at 05:01

## 2020-09-10 RX ADMIN — ENOXAPARIN SODIUM 40 MILLIGRAM(S): 100 INJECTION SUBCUTANEOUS at 11:36

## 2020-09-10 RX ADMIN — HYDROMORPHONE HYDROCHLORIDE 0.5 MILLIGRAM(S): 2 INJECTION INTRAMUSCULAR; INTRAVENOUS; SUBCUTANEOUS at 09:14

## 2020-09-10 RX ADMIN — HYDROMORPHONE HYDROCHLORIDE 0.5 MILLIGRAM(S): 2 INJECTION INTRAMUSCULAR; INTRAVENOUS; SUBCUTANEOUS at 00:16

## 2020-09-10 RX ADMIN — HYDROMORPHONE HYDROCHLORIDE 1 MILLIGRAM(S): 2 INJECTION INTRAMUSCULAR; INTRAVENOUS; SUBCUTANEOUS at 22:37

## 2020-09-10 RX ADMIN — SODIUM CHLORIDE 150 MILLILITER(S): 9 INJECTION, SOLUTION INTRAVENOUS at 21:53

## 2020-09-10 RX ADMIN — HYDROMORPHONE HYDROCHLORIDE 1 MILLIGRAM(S): 2 INJECTION INTRAMUSCULAR; INTRAVENOUS; SUBCUTANEOUS at 18:16

## 2020-09-10 RX ADMIN — PANTOPRAZOLE SODIUM 40 MILLIGRAM(S): 20 TABLET, DELAYED RELEASE ORAL at 05:01

## 2020-09-10 RX ADMIN — Medication 2 CAPSULE(S): at 18:16

## 2020-09-10 NOTE — PROGRESS NOTE ADULT - SUBJECTIVE AND OBJECTIVE BOX
Patient is a 55y old  Female who presents with a chief complaint of abdominal pain (09 Sep 2020 16:04)      SUBJECTIVE / OVERNIGHT EVENTS:    Events noted.  CONSTITUTIONAL: No fever,  or fatigue  RESPIRATORY: No cough, wheezing,  No shortness of breath  CARDIOVASCULAR: No chest pain, palpitations, dizziness, or leg swelling  GASTROINTESTINAL: Abd pain,   NEUROLOGICAL: No headaches,     MEDICATIONS  (STANDING):  amLODIPine   Tablet 5 milliGRAM(s) Oral daily  enoxaparin Injectable 40 milliGRAM(s) SubCutaneous daily  influenza   Vaccine 0.5 milliLiter(s) IntraMuscular once  lactated ringers. 1000 milliLiter(s) (150 mL/Hr) IV Continuous <Continuous>  metoprolol succinate ER 50 milliGRAM(s) Oral at bedtime  pancrelipase  (CREON 36,000 Lipase Units) 2 Capsule(s) Oral three times a day with meals  pantoprazole    Tablet 40 milliGRAM(s) Oral before breakfast  simethicone 80 milliGRAM(s) Chew two times a day    MEDICATIONS  (PRN):  HYDROmorphone  Injectable 1 milliGRAM(s) IV Push every 4 hours PRN Severe Pain (7 - 10)  ondansetron Injectable 4 milliGRAM(s) IV Push every 6 hours PRN Nausea and/or Vomiting  oxycodone    5 mG/acetaminophen 325 mG 1 Tablet(s) Oral every 6 hours PRN Moderate Pain (4 - 6)        CAPILLARY BLOOD GLUCOSE        I&O's Summary    09 Sep 2020 07:01  -  10 Sep 2020 07:00  --------------------------------------------------------  IN: 1650 mL / OUT: 0 mL / NET: 1650 mL        PHYSICAL EXAM:  GENERAL: NAD  NECK: Supple, No JVD  CHEST/LUNG: Clear to auscultation bilaterally; No wheezing.  HEART: Regular rate and rhythm; No murmurs, rubs, or gallops  ABDOMEN: Mild tenderness in epi  EXTREMITIES:   No edema  NEUROLOGY: AAO X 3      LABS:                        12.6   9.00  )-----------( 338      ( 10 Sep 2020 06:11 )             38.9     09-10    136  |  99  |  13  ----------------------------<  60<L>  4.7   |  20<L>  |  0.73    Ca    9.2      10 Sep 2020 06:11    TPro  7.5  /  Alb  4.5  /  TBili  0.7  /  DBili  x   /  AST  28  /  ALT  21  /  AlkPhos  61  09-09    PT/INR - ( 10 Sep 2020 09:13 )   PT: 12.6 sec;   INR: 1.06 ratio         PTT - ( 10 Sep 2020 09:13 )  PTT:28.9 sec      Urinalysis Basic - ( 09 Sep 2020 12:54 )    Color: Yellow / Appearance: Slightly Turbid / S.031 / pH: x  Gluc: x / Ketone: Moderate  / Bili: Negative / Urobili: Negative   Blood: x / Protein: 30 mg/dL / Nitrite: Negative   Leuk Esterase: Negative / RBC: 2 /hpf / WBC 3 /HPF   Sq Epi: x / Non Sq Epi: 5 /hpf / Bacteria: Negative      CAPILLARY BLOOD GLUCOSE                    RADIOLOGY & ADDITIONAL TESTS:    Imaging Personally Reviewed:    Consultant(s) Notes Reviewed:      Care Discussed with Consultants/Other Providers:    James Villarreal MD, CMD, FACP    [id number] [id number]  Office Tel: [phone]  Cell: [phone]

## 2020-09-10 NOTE — PROGRESS NOTE ADULT - PROBLEM SELECTOR PLAN 1
-suspect chronic pancreatitis  -NPO   -GI consult given new ductal dilatation on CT appreciated. For EUS today  - MRCP to confirm no acute pancreatitis vs obstruction  -low suspicion for cholangitis  -c/w LR@150 cc/hr  -dilaudid  1 mg IV prn for severe pain, oxy for moderate, she was treated with 1 mg in the past and 0.5 mg of Dilaudid is not working  -zofran prn for nausea  -start simethicone for gas

## 2020-09-10 NOTE — CONSULT NOTE ADULT - ATTENDING COMMENTS
As above.    Pt with abdominal pain.    Normal LFTs/lipase    CT scan demonstrates mildly dilated CBD to 9 mm (post cholecystectomy) and mild dilation of pancreatic duct    Recommendations:    #1.  MRI/MRCP with and without contrast    #2.  EUS to r/o CBD stone/ampullary mass/pancreatic mass.  NPO after MN for EUS 9/10/20.
Patient with pancreas divisum but with only one documented episode of acute pancreatitis after an ERCP. Pain appears not to be pancreatic in origin.

## 2020-09-10 NOTE — CONSULT NOTE ADULT - SUBJECTIVE AND OBJECTIVE BOX
Chief Complaint:  Patient is a 55y old  Female who presents with a chief complaint of abdominal pain (09 Sep 2020 15:34)    HPI:  55 year old female with hx of pancreatic divisum complicated by chronic pancreatitis s/p prior stent (since removed), recurrent episodes of abdominal pain Q3 months, SLE, ITP s/p splenectomy and Hypertension presents with abdominal pain. Patient reports 3 days of abdominal pain that she describes as 8/10, epigastric, radiating to the back, sharp and constant associated with two episodes of NBNB emesis and diarrhea x 1 day. She reports symptoms are identical to previous episodes of acute on chronic pancreatitis. She denies fevers, chills, chest pain, shortness of breath, melena, hematochezia, hematemesis, recent travel and sick contacts. She went to see Dr. Brunner as outpatient and was advised to come to the hospital.    CT on admission showed new mild dilatation of the main pancreatic duct and increased Intra and extrahepatic biliary ductal dilatation.     Allergies:  No Known Allergies    Home Medications:  Reviewed    Hospital Medications:  amLODIPine   Tablet 5 milliGRAM(s) Oral daily  enoxaparin Injectable 40 milliGRAM(s) SubCutaneous daily  HYDROmorphone  Injectable 0.5 milliGRAM(s) IV Push every 4 hours PRN  lactated ringers. 1000 milliLiter(s) IV Continuous <Continuous>  metoprolol succinate ER 50 milliGRAM(s) Oral at bedtime  ondansetron Injectable 4 milliGRAM(s) IV Push every 6 hours PRN  oxycodone    5 mG/acetaminophen 325 mG 1 Tablet(s) Oral every 6 hours PRN  pancrelipase  (CREON 36,000 Lipase Units) 2 Capsule(s) Oral three times a day with meals  pantoprazole    Tablet 40 milliGRAM(s) Oral before breakfast  simethicone 80 milliGRAM(s) Chew two times a day    PMHX/PSHX:  Anxiety  Hernia  Gastritis  Lupus  Pancreatitis  Lupus  Anxiety  ITP (idiopathic thrombocytopenic purpura)  Lupus (systemic lupus erythematosus)  Pancreas divisum  Pancreatitis  Breast CA  S/P hernia repair  S/P inguinal hernia repair  H/O bilateral breast implants  S/P hysterectomy  S/P splenectomy  History of hysterectomy  History of splenectomy    Family history:  Family history of colon cancer  Family history of breast cancer (Grandparent)  Family history of colon cancer  Family history of liver disease    Social History:   No history of tobacco use, EtOH use or illicit drug use     ROS:   General:  No fevers, chills  ENT:  No sore throat or dysphagia  CV:  No pain or palpitations  Resp:  No dyspnea, cough, wheezing  GI:  No pain, No nausea, No vomiting,  No rectal bleeding, No tarry stools  Skin:  No rash or edema      PHYSICAL EXAM:   GENERAL:  NAD, Appears stated age  HEENT:  NC/AT,  conjunctivae clear and pink, sclera -anicteric  CHEST:  CTA B/L, Normal effort  HEART:  RRR S1/S2, No murmurs  ABDOMEN:  Soft, diffuse abdominal tenderness, non-distended, BS+  EXTEREMITIES:  No cyanosis  SKIN:  Warm & Dry.  NEURO:  Alert, oriented    Vital Signs:  Vital Signs Last 24 Hrs  T(C): 36.8 (09 Sep 2020 10:36), Max: 36.8 (09 Sep 2020 10:36)  T(F): 98.3 (09 Sep 2020 10:36), Max: 98.3 (09 Sep 2020 10:36)  HR: 73 (09 Sep 2020 13:50) (73 - 92)  BP: 124/97 (09 Sep 2020 13:50) (124/97 - 152/90)  BP(mean): --  RR: 18 (09 Sep 2020 13:50) (16 - 18)  SpO2: 99% (09 Sep 2020 13:50) (99% - 100%)  Daily Height in cm: 167.64 (09 Sep 2020 10:07)    Daily     LABS:                        14.6   9.11  )-----------( 393      ( 09 Sep 2020 10:55 )             44.6     Mean Cell Volume: 95.5 fl (20 @ 10:55)        140  |  101  |  12  ----------------------------<  94  4.0   |  23  |  0.73    Ca    9.8      09 Sep 2020 10:55    TPro  7.5  /  Alb  4.5  /  TBili  0.7  /  DBili  x   /  AST  28  /  ALT  21  /  AlkPhos  61      LIVER FUNCTIONS - ( 09 Sep 2020 10:55 )  Alb: 4.5 g/dL / Pro: 7.5 g/dL / ALK PHOS: 61 U/L / ALT: 21 U/L / AST: 28 U/L / GGT: x             Urinalysis Basic - ( 09 Sep 2020 12:54 )    Color: Yellow / Appearance: Slightly Turbid / S.031 / pH: x  Gluc: x / Ketone: Moderate  / Bili: Negative / Urobili: Negative   Blood: x / Protein: 30 mg/dL / Nitrite: Negative   Leuk Esterase: Negative / RBC: 2 /hpf / WBC 3 /HPF   Sq Epi: x / Non Sq Epi: 5 /hpf / Bacteria: Negative    Amylase Serum--      Lipase serum29       Ammonia--                          14.6   9.11  )-----------( 393      ( 09 Sep 2020 10:55 )             44.6     Imaging:
HPI:  55 year old female with hx of pancreatic divisum complicated by documented history of chronic pancreatitis, SLE, ITP s/p splenectomy and HTN presents with abdominal pain. Patient reports 3 days of abdominal pain that she describes as 8/10, epigastric, radiating to the back, sharp and constant associated with two episodes of NBNB emesis and diarrhea x 1 day. She reports symptoms are identical to previous episodes of abdominal pain which she has attributed to chronic pancreatitis. She denies fevers, chills, chest pain, shortness of breath, melena, hematochezia, hematemesis, recent travel and sick contacts. She went to see Dr. Brunner (GI) as outpatient and was advised to come to the hospital. CT on admission showed new mild dilatation of the main pancreatic duct and increased Intra and extrahepatic biliary ductal dilatation to 9mm (Patient is post cholecystectomy).     Patient has had chronic episodic abdominal pain for years.  She has been seen at this hospital over the last three years for chronic abdominal pain with associated nausea and vomiting.  Her lab work is consistently normal and her imaging studies which have included several CTAP as well as one MRCP have shown no evidence of acute pancreatitis but the MRI did reveal pancreas divisum.  She underwent a cholecystectomy in 2018 as she was found to have sludge in the gallbladder though to have been contributing to pancreatitis. Postoperatively she had abdominal pain and vomiting and underwent an ERCP with failed biliary ductal cannulation and placement of a short stent into her dorsal pancreatic duct via the minor papilla. The major papilla could not be cannulated. After the procedure she developed post ERCP pancreatitis with lipase of 400 but this was self limited.  She has seen Dr. Chávez in the past but he no longer accepts her insurance. She tested negative for H pylori on her last endoscopy from 2018 and did not have evidence for celiac sprue. She does not believe she has undergone any gastric emptying study or food  allergy testing.  She takes creon for her diagnosis of chronic pancreatitis. She was referred to Dr. Redman for her history of pancreas divisum.    Allergies:  No Known Allergies    Home Medications:  Reviewed    Hospital Medications:  amLODIPine   Tablet 5 milliGRAM(s) Oral daily  enoxaparin Injectable 40 milliGRAM(s) SubCutaneous daily  HYDROmorphone  Injectable 0.5 milliGRAM(s) IV Push every 4 hours PRN  lactated ringers. 1000 milliLiter(s) IV Continuous <Continuous>  metoprolol succinate ER 50 milliGRAM(s) Oral at bedtime  ondansetron Injectable 4 milliGRAM(s) IV Push every 6 hours PRN  oxycodone    5 mG/acetaminophen 325 mG 1 Tablet(s) Oral every 6 hours PRN  pancrelipase  (CREON 36,000 Lipase Units) 2 Capsule(s) Oral three times a day with meals  pantoprazole    Tablet 40 milliGRAM(s) Oral before breakfast  simethicone 80 milliGRAM(s) Chew two times a day    PMHX/PSHX:  Anxiety  Hernia  Gastritis  Lupus  Pancreatitis  Lupus  Anxiety  ITP (idiopathic thrombocytopenic purpura)  Lupus (systemic lupus erythematosus)  Pancreas divisum  Pancreatitis  Breast CA  S/P hernia repair  S/P inguinal hernia repair  H/O bilateral breast implants  S/P hysterectomy  S/P splenectomy  History of hysterectomy  History of splenectomy  history of cholecystectomy    Family history:  Family history of colon cancer  Family history of breast cancer (Grandparent)  Family history of colon cancer  Family history of liver disease    Social History:   No history of tobacco use, EtOH use or illicit drug use     ROS:   General:  No fevers, chills  ENT:  No sore throat or dysphagia  CV:  No pain or palpitations  Resp:  No dyspnea, cough, wheezing  GI:  No pain, No nausea, No vomiting,  No rectal bleeding, No tarry stools  Skin:  No rash or edema      PHYSICAL EXAM:   GENERAL:  NAD, Appears stated age  HEENT:  NC/AT,  conjunctivae clear and pink, sclera -anicteric  CHEST:  CTA B/L, Normal effort  HEART:  RRR S1/S2, No murmurs  ABDOMEN:  Soft, distended, mild R sided abdominal tenderness  EXTEREMITIES:  No cyanosis  SKIN:  Warm & Dry.  NEURO:  Alert, oriented    Vital Signs Last 24 Hrs  T(C): 36.8 (10 Sep 2020 12:07), Max: 36.8 (10 Sep 2020 12:07)  T(F): 98.2 (10 Sep 2020 12:07), Max: 98.2 (10 Sep 2020 12:07)  HR: 70 (10 Sep 2020 12:07) (63 - 74)  BP: 102/66 (10 Sep 2020 12:07) (100/65 - 136/80)  BP(mean): --  RR: 16 (10 Sep 2020 12:07) (16 - 18)  SpO2: 95% (10 Sep 2020 12:07) (95% - 100%)    I&O's Summary    09 Sep 2020 07:01  -  10 Sep 2020 07:00  --------------------------------------------------------  IN: 1650 mL / OUT: 0 mL / NET: 1650 mL      Imaging:  No CT evidence of pancreatitis.    Pancreas divisum.    Mild dilatation of the main pancreatic duct.    Intra and extrahepatic biliary ductal dilatation, slightly increased.    A cystic structure in the gallbladder fossa may represent a dilated cystic duct remnant. MRI/MRCP may be helpful to further evaluate these findings.    Mild wall thickening versus underdistention of the ascending and transverse colon.

## 2020-09-10 NOTE — PRE-OP CHECKLIST - NOTHING BY MOUTH SINCE
Number Of Freeze-Thaw Cycles: 1 freeze-thaw cycle Render Post-Care Instructions In Note?: yes 10-Sep-2020 00:00 Consent: The patient's verbal consent was obtained including but not limited to risks of crusting, scabbing, blistering, scarring, darker or lighter pigmentary change, recurrence, incomplete removal and infection. Post-Care Instructions: I reviewed with the patient in detail post-care instructions. Patient is to wear sunprotection, and avoid picking at any of the treated lesions. Pt may apply Vaseline to crusted or scabbing areas. Duration Of Freeze Thaw-Cycle (Seconds): 6 Detail Level: Simple

## 2020-09-10 NOTE — CONSULT NOTE ADULT - ASSESSMENT
55F w/ PMHx of pancreatic divisum and documented pancreatitis s/p cholecystectomy and pancreatic stent placement with recurrent abdominal pain/? pancreatic insufficiency, SLE, ITP s/p splenectomy, HTN p/w abdominal pain and bloating x 2 days. C/f pain 2/2 acute on chronic pancreatitis vs abdominal pain and bloating due to malabsorption vs gastroenteritis.    - 55F w/ PMHx of pancreatic divisum and documented pancreatitis s/p cholecystectomy and pancreatic stent placement with recurrent abdominal pain, SLE, ITP s/p splenectomy, HTN p/w abdominal pain and bloating x 2 days.     - Patient with anatomic pancreas divisum however has not had any cross sectional imaging or laboratory work consistent with acute or chronic pancreatitis  over the last 6 years(aside from Sept 11 2018 which was post-ERCP and pancreatic stent placement and therefore iatrogenic)  - Etiology of abdominal pain is not clear. Agree with EUS for assessment of biliary ductal dilation however would not recommend recannulation of pancreatic duct or placement of stent as there is little data to support this in pancreas divisum.    -Would recommend GI workup including gastric emptying study (patient never received this due to narcotic dependency last hospitalization), food allergy testing. Consider other diagnoses such as bacterial overgrowth, IBS.   -May follow up with Dr. Redman as an outpatient for continued workup of pancreas divisum.      D/W Dr. Redman    [facility] R5  x [phone]

## 2020-09-10 NOTE — PRE PROCEDURE NOTE - PRE PROCEDURE EVALUATION
Attending Physician: dr. sahra jim                        Procedure: eus    Indication for Procedure: CBD/PD dilation  ________________________________________________________  PAST MEDICAL & SURGICAL HISTORY:  Anxiety  Hernia  Gastritis  Lupus  Pancreatitis  Lupus  Anxiety  ITP (idiopathic thrombocytopenic purpura)  Pancreas divisum  Pancreatitis  S/P hernia repair: Vent[facility] ( 1/5/2015 )  H/O bilateral breast implants  S/P hysterectomy: 8 years ago- endometriosis  S/P splenectomy: about 20 years ago- ITP  History of hysterectomy: secondary to endometriosis  History of splenectomy    ALLERGIES:  No Known Allergies    HOME MEDICATIONS:  amLODIPine 5 mg oral tablet: 1 tab(s) orally once a day    Creon 36,000 units oral delayed release capsule: 2 cap(s) orally 3 times a day  metoprolol succinate 25 mg oral tablet, extended release: 2 tab(s) orally once a day  pantoprazole 40 mg oral delayed release tablet: 1 tab(s) orally 2 times a day  Xanax 0.5 mg oral tablet: 1 tab(s) orally 2 times a day, As Needed    AICD/PPM: [ ] yes   [X] no    PERTINENT LAB DATA:                        12.6   9.00  )-----------( 338      ( 10 Sep 2020 06:11 )             38.9     09-10    136  |  99  |  13  ----------------------------<  60<L>  4.7   |  20<L>  |  0.73    Ca    9.2      10 Sep 2020 06:11    TPro  7.5  /  Alb  4.5  /  TBili  0.7  /  DBili  x   /  AST  28  /  ALT  21  /  AlkPhos  61  09-09    PT/INR - ( 10 Sep 2020 09:13 )   PT: 12.6 sec;   INR: 1.06 ratio      PTT - ( 10 Sep 2020 09:13 )  PTT:28.9 sec          PHYSICAL EXAMINATION:    Height (cm): 167.64  Weight (kg): 61.2  BMI (kg/m2): 21.8  BSA (m2): 1.69T(C): 36.7  HR: 77  BP: 120/76  RR: 14  SpO2: 98%    Constitutional: NAD    Neck:  No JVD  Respiratory: CTAB/L  Cardiovascular: S1 and S2  Gastrointestinal: BS+, soft, NT/ND  Extremities: No peripheral edema  Neurological: A/O x 3, no focal deficits      COMMENTS:    The patient is a suitable candidate for the planned procedure unless box checked [ ]  No, explain:

## 2020-09-11 LAB
ANION GAP SERPL CALC-SCNC: 16 MMOL/L — SIGNIFICANT CHANGE UP (ref 5–17)
BUN SERPL-MCNC: 8 MG/DL — SIGNIFICANT CHANGE UP (ref 7–23)
CALCIUM SERPL-MCNC: 9.4 MG/DL — SIGNIFICANT CHANGE UP (ref 8.4–10.5)
CHLORIDE SERPL-SCNC: 98 MMOL/L — SIGNIFICANT CHANGE UP (ref 96–108)
CO2 SERPL-SCNC: 21 MMOL/L — LOW (ref 22–31)
CREAT SERPL-MCNC: 0.6 MG/DL — SIGNIFICANT CHANGE UP (ref 0.5–1.3)
GLUCOSE SERPL-MCNC: 133 MG/DL — HIGH (ref 70–99)
HCT VFR BLD CALC: 38.1 % — SIGNIFICANT CHANGE UP (ref 34.5–45)
HGB BLD-MCNC: 13 G/DL — SIGNIFICANT CHANGE UP (ref 11.5–15.5)
MCHC RBC-ENTMCNC: 32.3 PG — SIGNIFICANT CHANGE UP (ref 27–34)
MCHC RBC-ENTMCNC: 34.1 GM/DL — SIGNIFICANT CHANGE UP (ref 32–36)
MCV RBC AUTO: 94.8 FL — SIGNIFICANT CHANGE UP (ref 80–100)
NRBC # BLD: 0 /100 WBCS — SIGNIFICANT CHANGE UP (ref 0–0)
PLATELET # BLD AUTO: 335 K/UL — SIGNIFICANT CHANGE UP (ref 150–400)
POTASSIUM SERPL-MCNC: 5.2 MMOL/L — SIGNIFICANT CHANGE UP (ref 3.5–5.3)
POTASSIUM SERPL-SCNC: 5.2 MMOL/L — SIGNIFICANT CHANGE UP (ref 3.5–5.3)
RBC # BLD: 4.02 M/UL — SIGNIFICANT CHANGE UP (ref 3.8–5.2)
RBC # FLD: 13.8 % — SIGNIFICANT CHANGE UP (ref 10.3–14.5)
SODIUM SERPL-SCNC: 135 MMOL/L — SIGNIFICANT CHANGE UP (ref 135–145)
WBC # BLD: 5.62 K/UL — SIGNIFICANT CHANGE UP (ref 3.8–10.5)
WBC # FLD AUTO: 5.62 K/UL — SIGNIFICANT CHANGE UP (ref 3.8–10.5)

## 2020-09-11 PROCEDURE — 99232 SBSQ HOSP IP/OBS MODERATE 35: CPT | Mod: GC

## 2020-09-11 RX ORDER — LACTULOSE 10 G/15ML
10 SOLUTION ORAL ONCE
Refills: 0 | Status: DISCONTINUED | OUTPATIENT
Start: 2020-09-11 | End: 2020-09-14

## 2020-09-11 RX ORDER — BENZOCAINE AND MENTHOL 5; 1 G/100ML; G/100ML
1 LIQUID ORAL ONCE
Refills: 0 | Status: COMPLETED | OUTPATIENT
Start: 2020-09-11 | End: 2020-09-11

## 2020-09-11 RX ADMIN — Medication 2 CAPSULE(S): at 17:35

## 2020-09-11 RX ADMIN — HYDROMORPHONE HYDROCHLORIDE 1 MILLIGRAM(S): 2 INJECTION INTRAMUSCULAR; INTRAVENOUS; SUBCUTANEOUS at 07:10

## 2020-09-11 RX ADMIN — HYDROMORPHONE HYDROCHLORIDE 1 MILLIGRAM(S): 2 INJECTION INTRAMUSCULAR; INTRAVENOUS; SUBCUTANEOUS at 07:30

## 2020-09-11 RX ADMIN — HYDROMORPHONE HYDROCHLORIDE 1 MILLIGRAM(S): 2 INJECTION INTRAMUSCULAR; INTRAVENOUS; SUBCUTANEOUS at 11:51

## 2020-09-11 RX ADMIN — SODIUM CHLORIDE 150 MILLILITER(S): 9 INJECTION, SOLUTION INTRAVENOUS at 09:01

## 2020-09-11 RX ADMIN — Medication 2 CAPSULE(S): at 11:51

## 2020-09-11 RX ADMIN — Medication 50 MILLIGRAM(S): at 21:06

## 2020-09-11 RX ADMIN — HYDROMORPHONE HYDROCHLORIDE 1 MILLIGRAM(S): 2 INJECTION INTRAMUSCULAR; INTRAVENOUS; SUBCUTANEOUS at 16:08

## 2020-09-11 RX ADMIN — AMLODIPINE BESYLATE 5 MILLIGRAM(S): 2.5 TABLET ORAL at 05:30

## 2020-09-11 RX ADMIN — HYDROMORPHONE HYDROCHLORIDE 1 MILLIGRAM(S): 2 INJECTION INTRAMUSCULAR; INTRAVENOUS; SUBCUTANEOUS at 03:04

## 2020-09-11 RX ADMIN — Medication 2 CAPSULE(S): at 09:01

## 2020-09-11 RX ADMIN — SIMETHICONE 80 MILLIGRAM(S): 80 TABLET, CHEWABLE ORAL at 17:35

## 2020-09-11 RX ADMIN — ENOXAPARIN SODIUM 40 MILLIGRAM(S): 100 INJECTION SUBCUTANEOUS at 11:50

## 2020-09-11 RX ADMIN — SIMETHICONE 80 MILLIGRAM(S): 80 TABLET, CHEWABLE ORAL at 05:30

## 2020-09-11 RX ADMIN — HYDROMORPHONE HYDROCHLORIDE 1 MILLIGRAM(S): 2 INJECTION INTRAMUSCULAR; INTRAVENOUS; SUBCUTANEOUS at 21:06

## 2020-09-11 RX ADMIN — HYDROMORPHONE HYDROCHLORIDE 1 MILLIGRAM(S): 2 INJECTION INTRAMUSCULAR; INTRAVENOUS; SUBCUTANEOUS at 12:10

## 2020-09-11 RX ADMIN — HYDROMORPHONE HYDROCHLORIDE 1 MILLIGRAM(S): 2 INJECTION INTRAMUSCULAR; INTRAVENOUS; SUBCUTANEOUS at 21:36

## 2020-09-11 RX ADMIN — HYDROMORPHONE HYDROCHLORIDE 1 MILLIGRAM(S): 2 INJECTION INTRAMUSCULAR; INTRAVENOUS; SUBCUTANEOUS at 02:37

## 2020-09-11 RX ADMIN — HYDROMORPHONE HYDROCHLORIDE 1 MILLIGRAM(S): 2 INJECTION INTRAMUSCULAR; INTRAVENOUS; SUBCUTANEOUS at 16:30

## 2020-09-11 RX ADMIN — PANTOPRAZOLE SODIUM 40 MILLIGRAM(S): 20 TABLET, DELAYED RELEASE ORAL at 05:30

## 2020-09-11 RX ADMIN — BENZOCAINE AND MENTHOL 1 LOZENGE: 5; 1 LIQUID ORAL at 06:56

## 2020-09-11 NOTE — PROGRESS NOTE ADULT - ASSESSMENT
55F w/ PMHx of pancreatic divisum and documented pancreatitis s/p cholecystectomy and pancreatic stent placement with recurrent abdominal pain, SLE, ITP s/p splenectomy, HTN p/w abdominal pain and bloating x 2 days. S/P EUS which revealed hiatal hernia, retained gastric contents and pyloric stenosis. Symptoms unlikely to be related to pancreas divisum, patient does not have pancreatitis.     - Recommend gastric emptying study, possible trial of erythromycin for gastric motility  - Follow up MRCP  - Likely to follow up with gastroenterology and  Dr. Remdan as an outpatient     D/W Dr. Redman

## 2020-09-11 NOTE — PROGRESS NOTE ADULT - SUBJECTIVE AND OBJECTIVE BOX
Patient is a 55y old  Female who presents with a chief complaint of abdominal pain (11 Sep 2020 09:34)      SUBJECTIVE / OVERNIGHT EVENTS:    Events noted.  CONSTITUTIONAL: No fever,  or fatigue  RESPIRATORY: No cough, wheezing,  No shortness of breath  CARDIOVASCULAR: No chest pain, palpitations, dizziness, or leg swelling  GASTROINTESTINAL: Mild abd discomfort/ Constipation  NEUROLOGICAL: No headaches,     MEDICATIONS  (STANDING):  amLODIPine   Tablet 5 milliGRAM(s) Oral daily  benzocaine 15 mG/menthol 3.6 mG (Sugar-Free) Lozenge 1 Lozenge Oral three times a day  enoxaparin Injectable 40 milliGRAM(s) SubCutaneous daily  influenza   Vaccine 0.5 milliLiter(s) IntraMuscular once  lactated ringers. 1000 milliLiter(s) (150 mL/Hr) IV Continuous <Continuous>  metoprolol succinate ER 50 milliGRAM(s) Oral at bedtime  pancrelipase  (CREON 36,000 Lipase Units) 2 Capsule(s) Oral three times a day with meals  pantoprazole    Tablet 40 milliGRAM(s) Oral before breakfast  simethicone 80 milliGRAM(s) Chew two times a day  sodium chloride 0.9%. 1000 milliLiter(s) (30 mL/Hr) IV Continuous <Continuous>    MEDICATIONS  (PRN):  HYDROmorphone  Injectable 1 milliGRAM(s) IV Push every 4 hours PRN Severe Pain (7 - 10)  ondansetron Injectable 4 milliGRAM(s) IV Push every 6 hours PRN Nausea and/or Vomiting  oxycodone    5 mG/acetaminophen 325 mG 1 Tablet(s) Oral every 6 hours PRN Moderate Pain (4 - 6)        CAPILLARY BLOOD GLUCOSE        I&O's Summary    10 Sep 2020 07:01  -  11 Sep 2020 07:00  --------------------------------------------------------  IN: 1170 mL / OUT: 0 mL / NET: 1170 mL    11 Sep 2020 07:01  -  11 Sep 2020 18:35  --------------------------------------------------------  IN: 300 mL / OUT: 300 mL / NET: 0 mL        PHYSICAL EXAM:  GENERAL: NAD  NECK: Supple, No JVD  CHEST/LUNG: Clear to auscultation bilaterally; No wheezing.  HEART: Regular rate and rhythm; No murmurs, rubs, or gallops  ABDOMEN: Soft, Nontender, Nondistended; Bowel sounds present  EXTREMITIES:   No edema  NEUROLOGY: AAO X 3      LABS:                        13.0   5.62  )-----------( 335      ( 11 Sep 2020 05:47 )             38.1     09-11    135  |  98  |  8   ----------------------------<  133<H>  5.2   |  21<L>  |  0.60    Ca    9.4      11 Sep 2020 05:47      PT/INR - ( 10 Sep 2020 09:13 )   PT: 12.6 sec;   INR: 1.06 ratio         PTT - ( 10 Sep 2020 09:13 )  PTT:28.9 sec        CAPILLARY BLOOD GLUCOSE                    RADIOLOGY & ADDITIONAL TESTS:    Imaging Personally Reviewed:    Consultant(s) Notes Reviewed:      Care Discussed with Consultants/Other Providers:    James Villarreal MD, CMD, FACP    [address] [place] [address]  Office Tel: [phone]  Cell: [phone]

## 2020-09-11 NOTE — PROGRESS NOTE ADULT - PROBLEM SELECTOR PLAN 1
S/p EUS: Dilated Pancreatic duct  MRCP: Dilated pancreatic duct  Gastric emptying study once off narcotics  IV dilaudid  GI/Sx f/up noted

## 2020-09-11 NOTE — PROGRESS NOTE ADULT - SUBJECTIVE AND OBJECTIVE BOX
Patient seen and examined on morning rounds. States she feels marginally better than when she was admitted. Underwent EUS yesterday which demonstrated retained fluid in the gastric fundus with a stenotic pylorus, no evidence of CBD stones or stricture, dilation of pancreatic duct without stones or stricture and presence of pancreas divisum.     Vital Signs Last 24 Hrs  T(C): 36.8 (11 Sep 2020 05:27), Max: 36.8 (10 Sep 2020 12:07)  T(F): 98.3 (11 Sep 2020 05:27), Max: 98.3 (10 Sep 2020 18:04)  HR: 72 (11 Sep 2020 05:27) (64 - 94)  BP: 106/65 (11 Sep 2020 05:27) (101/73 - 136/74)  BP(mean): --  RR: 16 (11 Sep 2020 05:27) (14 - 18)  SpO2: 96% (11 Sep 2020 05:27) (95% - 100%)    Exam:  Gen: A&O x 3, NAD  Abdomen: distended, soft, nontender    CBC (09-11 @ 05:47)                              13.0                           5.62    )----------------(  335        --    % Neutrophils, --    % Lymphocytes, ANC: --                                  38.1        BMP (09-11 @ 05:47)             135     |  98      |  8     		Ca++ --      Ca 9.4                ---------------------------------( 133<H>		Mg --                 5.2     |  21<L>   |  0.60  			Ph --          Coags (09-10 @ 09:13)  aPTT 28.9 / INR 1.06 / PT 12.6      Endoscopy  Impression:          - Medium sized hiatal hernia.                       - Retained fluid in gastric fundus despite NPO status with stenotic pylorus,                        consider gastroparesis vs pyloric stenosis.                       - Normal appearing ampulla without mass lesion.                       - Salt-and-pepper pancreatic parencyhma with mild hyperechoic stranding,                        without other characteristics of chronic pancreatitis.                       - Tortuous pancreatic duct with areas of dilation up to 4.9mm (largest                        dilation in body of pancreas). No PD stones or lesions.                       - Distinct insertions of PD and CBD, consistent with known diagnosis of                        pancreas divisum.                       - Mildly dilated CBD without stones or lesions, possibly due to                        post-cholecystectomy state.                       - Remnant spleen or splenule.  Recommendation:      - [facility] for [facility].                       - Further workup for gastroparesis with gastric emptying study (off                        narcotics).                       - Recommend outpatient followup with GI ([phone]) and with Dr. Redman                        (pancreas surgery).

## 2020-09-11 NOTE — PROGRESS NOTE ADULT - SUBJECTIVE AND OBJECTIVE BOX
Chief Complaint:  Patient is a 55y old  Female who presents with a chief complaint of abdominal pain (11 Sep 2020 09:00)    Interval Events:   No acute overnight events  Reports sore throat this morning  Continues to have abdominal pain    Allergies:  No Known Allergies    Hospital Medications:  amLODIPine   Tablet 5 milliGRAM(s) Oral daily  enoxaparin Injectable 40 milliGRAM(s) SubCutaneous daily  HYDROmorphone  Injectable 1 milliGRAM(s) IV Push every 4 hours PRN  influenza   Vaccine 0.5 milliLiter(s) IntraMuscular once  lactated ringers. 1000 milliLiter(s) IV Continuous <Continuous>  metoprolol succinate ER 50 milliGRAM(s) Oral at bedtime  ondansetron Injectable 4 milliGRAM(s) IV Push every 6 hours PRN  oxycodone    5 mG/acetaminophen 325 mG 1 Tablet(s) Oral every 6 hours PRN  pancrelipase  (CREON 36,000 Lipase Units) 2 Capsule(s) Oral three times a day with meals  pantoprazole    Tablet 40 milliGRAM(s) Oral before breakfast  simethicone 80 milliGRAM(s) Chew two times a day  sodium chloride 0.9%. 1000 milliLiter(s) IV Continuous <Continuous>    PMHX/PSHX:  Anxiety  Hernia  Gastritis  Lupus  Pancreatitis  Lupus  Anxiety  ITP (idiopathic thrombocytopenic purpura)  Lupus (systemic lupus erythematosus)  Pancreas divisum  Pancreatitis  Breast CA  S/P hernia repair  S/P inguinal hernia repair  H/O bilateral breast implants  S/P hysterectomy  S/P splenectomy  History of hysterectomy  History of splenectomy    ROS:   General:  No fevers, chills or night sweats  ENT:  +  sore throat No dysphagia  CV:  No pain or palpitations  Resp:  No dyspnea, cough or  wheezing  GI:  No pain, No nausea, No vomiting, No diarrhea, No rectal bleeding, No tarry stools,  Skin:  No rash or edema    PHYSICAL EXAM:   Vital Signs:  Vital Signs Last 24 Hrs  T(C): 36.8 (11 Sep 2020 05:27), Max: 36.8 (10 Sep 2020 12:07)  T(F): 98.3 (11 Sep 2020 05:27), Max: 98.3 (10 Sep 2020 18:04)  HR: 72 (11 Sep 2020 05:27) (64 - 94)  BP: 106/65 (11 Sep 2020 05:27) (101/73 - 136/74)  BP(mean): --  RR: 16 (11 Sep 2020 05:27) (14 - 18)  SpO2: 96% (11 Sep 2020 05:27) (95% - 100%)  Daily Height in cm: 167.64 (10 Sep 2020 15:23)    Daily     GENERAL:  NAD, Appears stated age  HEENT:  NC/AT,  conjunctivae clear and pink, sclera -anicteric  CHEST:  Normal Effort, Breath sounds clear  HEART:  RRR, S1 + S2, no murmurs  ABDOMEN:  Soft, non-tender, non-distended, BS+  EXTEREMITIES:  no cyanosis  SKIN:  Warm & Dry.  NEURO:  Alert, oriented    LABS:                        13.0   5.62  )-----------( 335      ( 11 Sep 2020 05:47 )             38.1     Mean Cell Volume: 94.8 fl (20 @ 05:47)        135  |  98  |  8   ----------------------------<  133<H>  5.2   |  21<L>  |  0.60    Ca    9.4      11 Sep 2020 05:47    TPro  7.5  /  Alb  4.5  /  TBili  0.7  /  DBili  x   /  AST  28  /  ALT  21  /  AlkPhos  61  09    LIVER FUNCTIONS - ( 09 Sep 2020 10:55 )  Alb: 4.5 g/dL / Pro: 7.5 g/dL / ALK PHOS: 61 U/L / ALT: 21 U/L / AST: 28 U/L / GGT: x           PT/INR - ( 10 Sep 2020 09:13 )   PT: 12.6 sec;   INR: 1.06 ratio         PTT - ( 10 Sep 2020 09:13 )  PTT:28.9 sec  Urinalysis Basic - ( 09 Sep 2020 12:54 )    Color: Yellow / Appearance: Slightly Turbid / S.031 / pH: x  Gluc: x / Ketone: Moderate  / Bili: Negative / Urobili: Negative   Blood: x / Protein: 30 mg/dL / Nitrite: Negative   Leuk Esterase: Negative / RBC: 2 /hpf / WBC 3 /HPF   Sq Epi: x / Non Sq Epi: 5 /hpf / Bacteria: Negative                              13.0   5.62  )-----------( 335      ( 11 Sep 2020 05:47 )             38.1                         12.6   9.00  )-----------( 338      ( 10 Sep 2020 06:11 )             38.9                         14.6   9.11  )-----------( 393      ( 09 Sep 2020 10:55 )             44.6       Imaging:  EUS:         - Medium sized hiatal hernia.                       - Retained fluid in gastric fundus despite NPO status with stenotic pylorus,                        consider gastroparesis vs pyloric stenosis.                       - Normal appearing ampulla without mass lesion.                       - Salt-and-pepper pancreatic parencyhma with mild hyperechoic stranding,                        without other characteristics of chronic pancreatitis.                       - Tortuous pancreatic duct with areas of dilation up to 4.9mm (largest                        dilation in body of pancreas). No PD stones or lesions.                       - Distinct insertions of PD and CBD, consistent with known diagnosis of                        pancreas divisum.                       - Mildly dilated CBD without stones or lesions, possibly due to                        post-cholecystectomy state.                       - Remnant spleen or splenule.

## 2020-09-11 NOTE — PROGRESS NOTE ADULT - ASSESSMENT
55 F hx of pancreatic divisum complicated by chronic pancreatitis s/p prior stent (since removed), recurrent episodes of abdominal pain Q3 months, SLE, ITP s/p splenectomy and Hypertension presents with abdominal pain.     Impression:  # Abdominal Pain: Differential includes gastroparesis and/or possible pyloric stenosis given EUS findings of retained food/fluid in the stomach & stenotic pylorus. Differential also includes chronic pancreatitis.  # Chronic Pancreatitis: EUS findings concerning for early changes (mild hyperechoic stranding) with tortuous pancreatic duct with dilated up to 4.9mm  # Dilated CBD: Mildly dilated without stones or lesions on EUS. Suspect secondary to post-cholecystectomy state.  (New biliary tree dilated (Intra + Extrahepatic): Unclear etiology. Suspect secondary to biliary stricture given known chronic pancreatitis. Less likely stones given cholecystectomy. Differential also includes masses given dilated PD and CBD  # SLE  # ITP s/p splenectomy    Recommendation  - Follow up MRCP read  - Agree with gastric emptying study  - Pain control  - Supportive care per primary team    Angélica Alejo MD  Gastroenterology Fellow  [phone]  Available on [employer] Teams  Please page on call fellow on weekends and after 5pm on weekdays: [phone]    Angélica Alejo MD  Gastroenterology Fellow  [phone]  Available on [employer] Teams  Please page on call fellow on weekends and after 5pm on weekdays: [phone] 55 F hx of pancreatic divisum complicated by chronic pancreatitis s/p prior stent (since removed), recurrent episodes of abdominal pain Q3 months, SLE, ITP s/p splenectomy and Hypertension presents with abdominal pain.     Impression:  # Abdominal Pain: Differential includes gastroparesis and/or possible pyloric stenosis given EUS findings of retained food/fluid in the stomach & stenotic pylorus. Differential also includes chronic pancreatitis.  # Chronic Pancreatitis: EUS findings concerning for early changes (mild hyperechoic stranding) with tortuous pancreatic duct with dilated up to 4.9mm  # Dilated CBD: Mildly dilated without stones or lesions on EUS. Suspect secondary to post-cholecystectomy state.  (New biliary tree dilated (Intra + Extrahepatic): Unclear etiology. Suspect secondary to biliary stricture given known chronic pancreatitis. Less likely stones given cholecystectomy. Differential also includes masses given dilated PD and CBD  # SLE  # ITP s/p splenectomy    Recommendation  - Follow up MRCP read  - Agree with gastric emptying study if able to be off pain medications  - Pain control  - Supportive care per primary team    Angélica Alejo MD  Gastroenterology Fellow  [phone]  Available on [employer] Teams  Please page on call fellow on weekends and after 5pm on weekdays: [phone]    Angélica Alejo MD  Gastroenterology Fellow  [phone]  Available on [employer] Teams  Please page on call fellow on weekends and after 5pm on weekdays: [phone]

## 2020-09-12 RX ORDER — HYDROMORPHONE HYDROCHLORIDE 2 MG/ML
0.5 INJECTION INTRAMUSCULAR; INTRAVENOUS; SUBCUTANEOUS EVERY 4 HOURS
Refills: 0 | Status: DISCONTINUED | OUTPATIENT
Start: 2020-09-12 | End: 2020-09-14

## 2020-09-12 RX ADMIN — HYDROMORPHONE HYDROCHLORIDE 1 MILLIGRAM(S): 2 INJECTION INTRAMUSCULAR; INTRAVENOUS; SUBCUTANEOUS at 10:35

## 2020-09-12 RX ADMIN — SODIUM CHLORIDE 150 MILLILITER(S): 9 INJECTION, SOLUTION INTRAVENOUS at 08:00

## 2020-09-12 RX ADMIN — HYDROMORPHONE HYDROCHLORIDE 1 MILLIGRAM(S): 2 INJECTION INTRAMUSCULAR; INTRAVENOUS; SUBCUTANEOUS at 07:10

## 2020-09-12 RX ADMIN — HYDROMORPHONE HYDROCHLORIDE 1 MILLIGRAM(S): 2 INJECTION INTRAMUSCULAR; INTRAVENOUS; SUBCUTANEOUS at 10:50

## 2020-09-12 RX ADMIN — PANTOPRAZOLE SODIUM 40 MILLIGRAM(S): 20 TABLET, DELAYED RELEASE ORAL at 06:40

## 2020-09-12 RX ADMIN — HYDROMORPHONE HYDROCHLORIDE 0.5 MILLIGRAM(S): 2 INJECTION INTRAMUSCULAR; INTRAVENOUS; SUBCUTANEOUS at 18:15

## 2020-09-12 RX ADMIN — HYDROMORPHONE HYDROCHLORIDE 1 MILLIGRAM(S): 2 INJECTION INTRAMUSCULAR; INTRAVENOUS; SUBCUTANEOUS at 06:40

## 2020-09-12 RX ADMIN — HYDROMORPHONE HYDROCHLORIDE 1 MILLIGRAM(S): 2 INJECTION INTRAMUSCULAR; INTRAVENOUS; SUBCUTANEOUS at 02:10

## 2020-09-12 RX ADMIN — AMLODIPINE BESYLATE 5 MILLIGRAM(S): 2.5 TABLET ORAL at 06:40

## 2020-09-12 RX ADMIN — HYDROMORPHONE HYDROCHLORIDE 1 MILLIGRAM(S): 2 INJECTION INTRAMUSCULAR; INTRAVENOUS; SUBCUTANEOUS at 14:39

## 2020-09-12 RX ADMIN — Medication 2 CAPSULE(S): at 17:11

## 2020-09-12 RX ADMIN — HYDROMORPHONE HYDROCHLORIDE 0.5 MILLIGRAM(S): 2 INJECTION INTRAMUSCULAR; INTRAVENOUS; SUBCUTANEOUS at 22:08

## 2020-09-12 RX ADMIN — HYDROMORPHONE HYDROCHLORIDE 0.5 MILLIGRAM(S): 2 INJECTION INTRAMUSCULAR; INTRAVENOUS; SUBCUTANEOUS at 22:40

## 2020-09-12 RX ADMIN — HYDROMORPHONE HYDROCHLORIDE 1 MILLIGRAM(S): 2 INJECTION INTRAMUSCULAR; INTRAVENOUS; SUBCUTANEOUS at 01:48

## 2020-09-12 RX ADMIN — Medication 2 CAPSULE(S): at 08:14

## 2020-09-12 RX ADMIN — SIMETHICONE 80 MILLIGRAM(S): 80 TABLET, CHEWABLE ORAL at 06:40

## 2020-09-12 RX ADMIN — ENOXAPARIN SODIUM 40 MILLIGRAM(S): 100 INJECTION SUBCUTANEOUS at 11:20

## 2020-09-12 RX ADMIN — SIMETHICONE 80 MILLIGRAM(S): 80 TABLET, CHEWABLE ORAL at 17:11

## 2020-09-12 RX ADMIN — HYDROMORPHONE HYDROCHLORIDE 1 MILLIGRAM(S): 2 INJECTION INTRAMUSCULAR; INTRAVENOUS; SUBCUTANEOUS at 15:00

## 2020-09-12 RX ADMIN — Medication 50 MILLIGRAM(S): at 21:48

## 2020-09-12 NOTE — DOWNTIME INTERRUPTION NOTE - WHICH MANUAL FORMS INITIATED?
Electronic chart incomplete.  [place] downtime  9/11/20 2200 - 9/12/20 1000. See paper records for clinical information entered during [place] downtime.

## 2020-09-12 NOTE — PROGRESS NOTE ADULT - PROBLEM SELECTOR PLAN 1
S/p EUS: Dilated Pancreatic duct  MRCP: Dilated pancreatic duct  Gastric emptying study once off narcotics  IV dilaudid

## 2020-09-12 NOTE — PROGRESS NOTE ADULT - SUBJECTIVE AND OBJECTIVE BOX
Patient is a 55y old  Female who presents with a chief complaint of abdominal pain (11 Sep 2020 16:35)      SUBJECTIVE / OVERNIGHT EVENTS:    Events noted.  CONSTITUTIONAL: No fever,  or fatigue  RESPIRATORY: No cough, wheezing,  No shortness of breath  CARDIOVASCULAR: No chest pain, palpitations, dizziness, or leg swelling  GASTROINTESTINAL: No abdominal or epigastric pain. No nausea, vomiting.  NEUROLOGICAL: No headaches,     MEDICATIONS  (STANDING):  amLODIPine   Tablet 5 milliGRAM(s) Oral daily  benzocaine 15 mG/menthol 3.6 mG (Sugar-Free) Lozenge 1 Lozenge Oral three times a day  enoxaparin Injectable 40 milliGRAM(s) SubCutaneous daily  influenza   Vaccine 0.5 milliLiter(s) IntraMuscular once  lactated ringers. 1000 milliLiter(s) (150 mL/Hr) IV Continuous <Continuous>  lactulose Syrup 10 Gram(s) Oral once  metoprolol succinate ER 50 milliGRAM(s) Oral at bedtime  pancrelipase  (CREON 36,000 Lipase Units) 2 Capsule(s) Oral three times a day with meals  pantoprazole    Tablet 40 milliGRAM(s) Oral before breakfast  simethicone 80 milliGRAM(s) Chew two times a day  sodium chloride 0.9%. 1000 milliLiter(s) (30 mL/Hr) IV Continuous <Continuous>    MEDICATIONS  (PRN):  HYDROmorphone  Injectable 0.5 milliGRAM(s) IV Push every 4 hours PRN Severe Pain (7 - 10)  ondansetron Injectable 4 milliGRAM(s) IV Push every 6 hours PRN Nausea and/or Vomiting  oxycodone    5 mG/acetaminophen 325 mG 1 Tablet(s) Oral every 6 hours PRN Moderate Pain (4 - 6)        CAPILLARY BLOOD GLUCOSE        I&O's Summary    11 Sep 2020 07:01  -  12 Sep 2020 07:00  --------------------------------------------------------  IN: 2240 mL / OUT: 300 mL / NET: 1940 mL    12 Sep 2020 07:01  -  12 Sep 2020 18:36  --------------------------------------------------------  IN: 600 mL / OUT: 0 mL / NET: 600 mL        PHYSICAL EXAM:  GENERAL: NAD  NECK: Supple, No JVD  CHEST/LUNG: Clear to auscultation bilaterally; No wheezing.  HEART: Regular rate and rhythm; No murmurs, rubs, or gallops  ABDOMEN: Soft, Nontender, Nondistended; Bowel sounds present  EXTREMITIES:   No edema  NEUROLOGY: AAO X 3      LABS:                        13.0   5.62  )-----------( 335      ( 11 Sep 2020 05:47 )             38.1     09-11    135  |  98  |  8   ----------------------------<  133<H>  5.2   |  21<L>  |  0.60    Ca    9.4      11 Sep 2020 05:47              CAPILLARY BLOOD GLUCOSE                    RADIOLOGY & ADDITIONAL TESTS:    Imaging Personally Reviewed:    Consultant(s) Notes Reviewed:      Care Discussed with Consultants/Other Providers:    James Villarreal MD, CMD, FACP    [address] [place] [address]  Office Tel: [phone]  Cell: [phone]

## 2020-09-12 NOTE — CHART NOTE - NSCHARTNOTEFT_GEN_A_CORE
Pt requesting to advance diet. Discussed with GI fellow, no contraindication to advancing diet at this time. Continue to monitor

## 2020-09-13 LAB
ALBUMIN SERPL ELPH-MCNC: 3.9 G/DL — SIGNIFICANT CHANGE UP (ref 3.3–5)
ALP SERPL-CCNC: 51 U/L — SIGNIFICANT CHANGE UP (ref 40–120)
ALT FLD-CCNC: 25 U/L — SIGNIFICANT CHANGE UP (ref 10–45)
ANION GAP SERPL CALC-SCNC: 10 MMOL/L — SIGNIFICANT CHANGE UP (ref 5–17)
AST SERPL-CCNC: 25 U/L — SIGNIFICANT CHANGE UP (ref 10–40)
BILIRUB SERPL-MCNC: 0.5 MG/DL — SIGNIFICANT CHANGE UP (ref 0.2–1.2)
BUN SERPL-MCNC: 5 MG/DL — LOW (ref 7–23)
CALCIUM SERPL-MCNC: 9.9 MG/DL — SIGNIFICANT CHANGE UP (ref 8.4–10.5)
CHLORIDE SERPL-SCNC: 97 MMOL/L — SIGNIFICANT CHANGE UP (ref 96–108)
CO2 SERPL-SCNC: 31 MMOL/L — SIGNIFICANT CHANGE UP (ref 22–31)
CREAT SERPL-MCNC: 0.69 MG/DL — SIGNIFICANT CHANGE UP (ref 0.5–1.3)
GLUCOSE SERPL-MCNC: 88 MG/DL — SIGNIFICANT CHANGE UP (ref 70–99)
POTASSIUM SERPL-MCNC: 4.1 MMOL/L — SIGNIFICANT CHANGE UP (ref 3.5–5.3)
POTASSIUM SERPL-SCNC: 4.1 MMOL/L — SIGNIFICANT CHANGE UP (ref 3.5–5.3)
PROT SERPL-MCNC: 6.6 G/DL — SIGNIFICANT CHANGE UP (ref 6–8.3)
SARS-COV-2 IGG SERPL QL IA: NEGATIVE — SIGNIFICANT CHANGE UP
SARS-COV-2 IGM SERPL IA-ACNC: 0.08 INDEX — SIGNIFICANT CHANGE UP
SODIUM SERPL-SCNC: 138 MMOL/L — SIGNIFICANT CHANGE UP (ref 135–145)

## 2020-09-13 RX ORDER — SODIUM CHLORIDE 9 MG/ML
1000 INJECTION, SOLUTION INTRAVENOUS
Refills: 0 | Status: DISCONTINUED | OUTPATIENT
Start: 2020-09-13 | End: 2020-09-14

## 2020-09-13 RX ORDER — SODIUM CHLORIDE 9 MG/ML
1000 INJECTION, SOLUTION INTRAVENOUS
Refills: 0 | Status: DISCONTINUED | OUTPATIENT
Start: 2020-09-13 | End: 2020-09-13

## 2020-09-13 RX ADMIN — HYDROMORPHONE HYDROCHLORIDE 0.5 MILLIGRAM(S): 2 INJECTION INTRAMUSCULAR; INTRAVENOUS; SUBCUTANEOUS at 06:54

## 2020-09-13 RX ADMIN — HYDROMORPHONE HYDROCHLORIDE 0.5 MILLIGRAM(S): 2 INJECTION INTRAMUSCULAR; INTRAVENOUS; SUBCUTANEOUS at 03:00

## 2020-09-13 RX ADMIN — OXYCODONE AND ACETAMINOPHEN 1 TABLET(S): 5; 325 TABLET ORAL at 23:50

## 2020-09-13 RX ADMIN — SIMETHICONE 80 MILLIGRAM(S): 80 TABLET, CHEWABLE ORAL at 05:33

## 2020-09-13 RX ADMIN — ENOXAPARIN SODIUM 40 MILLIGRAM(S): 100 INJECTION SUBCUTANEOUS at 11:23

## 2020-09-13 RX ADMIN — Medication 2 CAPSULE(S): at 11:25

## 2020-09-13 RX ADMIN — OXYCODONE AND ACETAMINOPHEN 1 TABLET(S): 5; 325 TABLET ORAL at 11:50

## 2020-09-13 RX ADMIN — HYDROMORPHONE HYDROCHLORIDE 0.5 MILLIGRAM(S): 2 INJECTION INTRAMUSCULAR; INTRAVENOUS; SUBCUTANEOUS at 02:19

## 2020-09-13 RX ADMIN — OXYCODONE AND ACETAMINOPHEN 1 TABLET(S): 5; 325 TABLET ORAL at 17:50

## 2020-09-13 RX ADMIN — SODIUM CHLORIDE 100 MILLILITER(S): 9 INJECTION, SOLUTION INTRAVENOUS at 23:20

## 2020-09-13 RX ADMIN — PANTOPRAZOLE SODIUM 40 MILLIGRAM(S): 20 TABLET, DELAYED RELEASE ORAL at 05:32

## 2020-09-13 RX ADMIN — Medication 2 CAPSULE(S): at 06:57

## 2020-09-13 RX ADMIN — OXYCODONE AND ACETAMINOPHEN 1 TABLET(S): 5; 325 TABLET ORAL at 23:20

## 2020-09-13 RX ADMIN — OXYCODONE AND ACETAMINOPHEN 1 TABLET(S): 5; 325 TABLET ORAL at 17:22

## 2020-09-13 RX ADMIN — SIMETHICONE 80 MILLIGRAM(S): 80 TABLET, CHEWABLE ORAL at 17:22

## 2020-09-13 RX ADMIN — OXYCODONE AND ACETAMINOPHEN 1 TABLET(S): 5; 325 TABLET ORAL at 11:23

## 2020-09-13 RX ADMIN — AMLODIPINE BESYLATE 5 MILLIGRAM(S): 2.5 TABLET ORAL at 05:32

## 2020-09-13 NOTE — DIETITIAN INITIAL EVALUATION ADULT. - PERTINENT MEDS FT
MEDICATIONS  (STANDING):  amLODIPine   Tablet 5 milliGRAM(s) Oral daily  enoxaparin Injectable 40 milliGRAM(s) SubCutaneous daily  influenza   Vaccine 0.5 milliLiter(s) IntraMuscular once  lactated ringers. 1000 milliLiter(s) (100 mL/Hr) IV Continuous <Continuous>  lactulose Syrup 10 Gram(s) Oral once  metoprolol succinate ER 50 milliGRAM(s) Oral at bedtime  pancrelipase  (CREON 36,000 Lipase Units) 2 Capsule(s) Oral three times a day with meals  pantoprazole    Tablet 40 milliGRAM(s) Oral before breakfast  simethicone 80 milliGRAM(s) Chew two times a day  sodium chloride 0.9%. 1000 milliLiter(s) (30 mL/Hr) IV Continuous <Continuous>    MEDICATIONS  (PRN):  HYDROmorphone  Injectable 0.5 milliGRAM(s) IV Push every 4 hours PRN Severe Pain (7 - 10)  ondansetron Injectable 4 milliGRAM(s) IV Push every 6 hours PRN Nausea and/or Vomiting  oxycodone    5 mG/acetaminophen 325 mG 1 Tablet(s) Oral every 6 hours PRN Moderate Pain (4 - 6)

## 2020-09-13 NOTE — DIETITIAN INITIAL EVALUATION ADULT. - PROBLEM SELECTOR PLAN 1
-suspect chronic pancreatitis, vs gas pain from malabsorption vs gastroenteritis  -NPO for now pending GI eval  -GI consult given new ductal dilatation on CT  -consider MRCP to confirm no acute pancreatitis vs obstruction  -GI pcr if diarrhea  -CXR, ECG to complete workup  -low suspicion for cholangitis  -c/w LR@150 cc/hr  -dilaudid prn for severe pain, oxy for moderate  -zofran prn for nausea  -start simethicone for gas

## 2020-09-13 NOTE — DIETITIAN INITIAL EVALUATION ADULT. - PHYSICAL APPEARANCE
well nourished/other (specify) Ht: 66in, Wt: 134lbs, BMI: 21.8kg/m2, IBW: 130lbs +/- 10%  Edema: none noted   Skin per nursing flowsheets: no pressure injury documented

## 2020-09-13 NOTE — PROGRESS NOTE ADULT - SUBJECTIVE AND OBJECTIVE BOX
Patient is a 55y old  Female who presents with a chief complaint of abdominal pain (12 Sep 2020 16:35)      SUBJECTIVE / OVERNIGHT EVENTS:    Events noted.  CONSTITUTIONAL: No fever,  or fatigue  RESPIRATORY: No cough, wheezing,  No shortness of breath  CARDIOVASCULAR: No chest pain, palpitations, dizziness, or leg swelling  GASTROINTESTINAL: No abdominal or epigastric pain. No nausea, vomiting.  NEUROLOGICAL: No headaches,     MEDICATIONS  (STANDING):  amLODIPine   Tablet 5 milliGRAM(s) Oral daily  enoxaparin Injectable 40 milliGRAM(s) SubCutaneous daily  influenza   Vaccine 0.5 milliLiter(s) IntraMuscular once  lactated ringers. 1000 milliLiter(s) (100 mL/Hr) IV Continuous <Continuous>  lactulose Syrup 10 Gram(s) Oral once  metoprolol succinate ER 50 milliGRAM(s) Oral at bedtime  pancrelipase  (CREON 36,000 Lipase Units) 2 Capsule(s) Oral three times a day with meals  pantoprazole    Tablet 40 milliGRAM(s) Oral before breakfast  simethicone 80 milliGRAM(s) Chew two times a day  sodium chloride 0.9%. 1000 milliLiter(s) (30 mL/Hr) IV Continuous <Continuous>    MEDICATIONS  (PRN):  HYDROmorphone  Injectable 0.5 milliGRAM(s) IV Push every 4 hours PRN Severe Pain (7 - 10)  ondansetron Injectable 4 milliGRAM(s) IV Push every 6 hours PRN Nausea and/or Vomiting  oxycodone    5 mG/acetaminophen 325 mG 1 Tablet(s) Oral every 6 hours PRN Moderate Pain (4 - 6)        CAPILLARY BLOOD GLUCOSE        I&O's Summary    12 Sep 2020 07:01  -  13 Sep 2020 07:00  --------------------------------------------------------  IN: 2200 mL / OUT: 0 mL / NET: 2200 mL    13 Sep 2020 07:01  -  13 Sep 2020 21:51  --------------------------------------------------------  IN: 480 mL / OUT: 0 mL / NET: 480 mL        PHYSICAL EXAM:  GENERAL: NAD  NECK: Supple, No JVD  CHEST/LUNG: Clear to auscultation bilaterally; No wheezing.  HEART: Regular rate and rhythm; No murmurs, rubs, or gallops  ABDOMEN: Soft, Nontender, Nondistended; Bowel sounds present  EXTREMITIES:   No edema  NEUROLOGY: AAO X 3      LABS:    09-13    138  |  97  |  5<L>  ----------------------------<  88  4.1   |  31  |  0.69    Ca    9.9      13 Sep 2020 07:00    TPro  6.6  /  Alb  3.9  /  TBili  0.5  /  DBili  x   /  AST  25  /  ALT  25  /  AlkPhos  51  09-13            CAPILLARY BLOOD GLUCOSE                    RADIOLOGY & ADDITIONAL TESTS:    Imaging Personally Reviewed:    Consultant(s) Notes Reviewed:      Care Discussed with Consultants/Other Providers:    James Villarreal MD, CMD, FACP    [address] [place] [address]  Office Tel: [phone]  Cell: [phone]

## 2020-09-13 NOTE — DIETITIAN INITIAL EVALUATION ADULT. - REASON INDICATOR FOR ASSESSMENT
Pt seen for nutrition consult.   Source: EMR, pt, and pt's  at bedside.   Pertinent chart information: 55F w/ PMHx of pancreatic divisum c/b pancreatitis requiring prior stent (since removed) and recurrent abdominal pain/pancreatic insufficiency, SLE, ITP s/p splenectomy, HTN p/w abdominal pain and bloating x 2 days. C/f pain 2/2 acute on chronic pancreatitis vs abdominal pain and bloating due to malabsorption vs gastroenteritis.

## 2020-09-13 NOTE — DIETITIAN INITIAL EVALUATION ADULT. - OTHER INFO
Visited pt at bedside. Pt reports having a good appetite PTA. Reports that she doesn't follow a specific diet at home except that she does not eat too much "roughage". Pt was NPO and then on clear in-house. Diet now upgraded to regular and pt reports having a good appetite.      Pt denies any known food allergies or intolerances. Pt denies any chewing/swallowing difficulty, self-feeding difficulty, nausea/vomiting, constipation, or diarrhea. Pt denies any micronutrient supplementation at home.     Pt reports a UBW between 130-135lbs; denies recent weight changes.     Education: low fat diet education provided; sources discussed. Pt also encouraged to have small frequent meals at home. Encouraged pt to order nutrient dense snacks with meals to consume in between to mimic smaller, more frequent meals in house. Encouraged to avoid gastric irritants such as spicy and caffeinated foods.

## 2020-09-13 NOTE — DIETITIAN INITIAL EVALUATION ADULT. - ADD RECOMMEND
Reinforce diet education as needed; RD remains available. Monitor PO intake, diet tolerance, weight trends, labs, GI function, and skin integrity.

## 2020-09-14 ENCOUNTER — TRANSCRIPTION ENCOUNTER (OUTPATIENT)
Age: 55
End: 2020-09-14

## 2020-09-14 VITALS
RESPIRATION RATE: 16 BRPM | TEMPERATURE: 98 F | HEART RATE: 96 BPM | SYSTOLIC BLOOD PRESSURE: 116 MMHG | OXYGEN SATURATION: 96 % | DIASTOLIC BLOOD PRESSURE: 77 MMHG

## 2020-09-14 PROCEDURE — 85025 COMPLETE CBC W/AUTO DIFF WBC: CPT

## 2020-09-14 PROCEDURE — 82947 ASSAY GLUCOSE BLOOD QUANT: CPT

## 2020-09-14 PROCEDURE — 74181 MRI ABDOMEN W/O CONTRAST: CPT

## 2020-09-14 PROCEDURE — 85730 THROMBOPLASTIN TIME PARTIAL: CPT

## 2020-09-14 PROCEDURE — 90686 IIV4 VACC NO PRSV 0.5 ML IM: CPT

## 2020-09-14 PROCEDURE — 82330 ASSAY OF CALCIUM: CPT

## 2020-09-14 PROCEDURE — U0003: CPT

## 2020-09-14 PROCEDURE — 80053 COMPREHEN METABOLIC PANEL: CPT

## 2020-09-14 PROCEDURE — 84132 ASSAY OF SERUM POTASSIUM: CPT

## 2020-09-14 PROCEDURE — 71045 X-RAY EXAM CHEST 1 VIEW: CPT

## 2020-09-14 PROCEDURE — 85027 COMPLETE CBC AUTOMATED: CPT

## 2020-09-14 PROCEDURE — 83605 ASSAY OF LACTIC ACID: CPT

## 2020-09-14 PROCEDURE — 82803 BLOOD GASES ANY COMBINATION: CPT

## 2020-09-14 PROCEDURE — 81025 URINE PREGNANCY TEST: CPT

## 2020-09-14 PROCEDURE — 80048 BASIC METABOLIC PNL TOTAL CA: CPT

## 2020-09-14 PROCEDURE — 83690 ASSAY OF LIPASE: CPT

## 2020-09-14 PROCEDURE — 85014 HEMATOCRIT: CPT

## 2020-09-14 PROCEDURE — 85610 PROTHROMBIN TIME: CPT

## 2020-09-14 PROCEDURE — 74177 CT ABD & PELVIS W/CONTRAST: CPT

## 2020-09-14 PROCEDURE — 99285 EMERGENCY DEPT VISIT HI MDM: CPT | Mod: 25

## 2020-09-14 PROCEDURE — 96374 THER/PROPH/DIAG INJ IV PUSH: CPT | Mod: XU

## 2020-09-14 PROCEDURE — 81001 URINALYSIS AUTO W/SCOPE: CPT

## 2020-09-14 PROCEDURE — 86769 SARS-COV-2 COVID-19 ANTIBODY: CPT

## 2020-09-14 PROCEDURE — 86703 HIV-1/HIV-2 1 RESULT ANTBDY: CPT

## 2020-09-14 PROCEDURE — 82435 ASSAY OF BLOOD CHLORIDE: CPT

## 2020-09-14 PROCEDURE — 84295 ASSAY OF SERUM SODIUM: CPT

## 2020-09-14 PROCEDURE — 85018 HEMOGLOBIN: CPT

## 2020-09-14 RX ORDER — AMLODIPINE BESYLATE 2.5 MG/1
1 TABLET ORAL
Qty: 0 | Refills: 0 | DISCHARGE
Start: 2020-09-14

## 2020-09-14 RX ORDER — PANTOPRAZOLE SODIUM 20 MG/1
1 TABLET, DELAYED RELEASE ORAL
Qty: 0 | Refills: 0 | DISCHARGE
Start: 2020-09-14

## 2020-09-14 RX ORDER — PANTOPRAZOLE SODIUM 20 MG/1
1 TABLET, DELAYED RELEASE ORAL
Qty: 0 | Refills: 0 | DISCHARGE

## 2020-09-14 RX ORDER — AMLODIPINE BESYLATE 2.5 MG/1
1 TABLET ORAL
Qty: 0 | Refills: 0 | DISCHARGE

## 2020-09-14 RX ORDER — SIMETHICONE 80 MG/1
1 TABLET, CHEWABLE ORAL
Qty: 28 | Refills: 0
Start: 2020-09-14 | End: 2020-09-27

## 2020-09-14 RX ORDER — METOPROLOL TARTRATE 50 MG
1 TABLET ORAL
Qty: 0 | Refills: 0 | DISCHARGE
Start: 2020-09-14

## 2020-09-14 RX ORDER — METOPROLOL TARTRATE 50 MG
2 TABLET ORAL
Qty: 0 | Refills: 0 | DISCHARGE

## 2020-09-14 RX ORDER — ONDANSETRON 8 MG/1
1 TABLET, FILM COATED ORAL
Qty: 21 | Refills: 0
Start: 2020-09-14 | End: 2020-09-20

## 2020-09-14 RX ADMIN — OXYCODONE AND ACETAMINOPHEN 1 TABLET(S): 5; 325 TABLET ORAL at 12:08

## 2020-09-14 RX ADMIN — Medication 2 CAPSULE(S): at 12:12

## 2020-09-14 RX ADMIN — ENOXAPARIN SODIUM 40 MILLIGRAM(S): 100 INJECTION SUBCUTANEOUS at 12:13

## 2020-09-14 RX ADMIN — SIMETHICONE 80 MILLIGRAM(S): 80 TABLET, CHEWABLE ORAL at 05:44

## 2020-09-14 RX ADMIN — INFLUENZA VIRUS VACCINE 0.5 MILLILITER(S): 15; 15; 15; 15 SUSPENSION INTRAMUSCULAR at 14:14

## 2020-09-14 RX ADMIN — OXYCODONE AND ACETAMINOPHEN 1 TABLET(S): 5; 325 TABLET ORAL at 05:48

## 2020-09-14 RX ADMIN — AMLODIPINE BESYLATE 5 MILLIGRAM(S): 2.5 TABLET ORAL at 05:44

## 2020-09-14 RX ADMIN — SODIUM CHLORIDE 100 MILLILITER(S): 9 INJECTION, SOLUTION INTRAVENOUS at 08:23

## 2020-09-14 RX ADMIN — PANTOPRAZOLE SODIUM 40 MILLIGRAM(S): 20 TABLET, DELAYED RELEASE ORAL at 05:44

## 2020-09-14 RX ADMIN — OXYCODONE AND ACETAMINOPHEN 1 TABLET(S): 5; 325 TABLET ORAL at 06:23

## 2020-09-14 RX ADMIN — Medication 2 CAPSULE(S): at 08:25

## 2020-09-14 NOTE — PROGRESS NOTE ADULT - ASSESSMENT
55 F hx of pancreatic divisum complicated by chronic pancreatitis s/p prior stent (since removed), recurrent episodes of abdominal pain Q3 months, SLE, ITP s/p splenectomy and Hypertension presents with abdominal pain.     Impression:  # Abdominal Pain: Differential includes gastroparesis and/or possible pyloric stenosis given EUS findings of retained food/fluid in the stomach & stenotic pylorus. Differential also includes chronic pancreatitis. Clinically improved  # Chronic Pancreatitis: EUS findings concerning for early changes (mild hyperechoic stranding) with tortuous pancreatic duct with dilated up to 4.9mm  # Dilated CBD: Mildly dilated without stones or lesions on EUS. Suspect secondary to post-cholecystectomy state.  (New biliary tree dilated (Intra + Extrahepatic): Unclear etiology. Suspect secondary to biliary stricture given known chronic pancreatitis. Less likely stones given cholecystectomy. Differential also includes masses given dilated PD and CBD  # SLE  # ITP s/p splenectomy    Recommendation  - Outpatient follow up with Dr. Brunner  - Gastric emptying study if able to be off pain medications  - Pain control  - Supportive care per primary team    Angélica Alejo MD  Gastroenterology Fellow  [phone]  Available on [employer] Teams  Please page on call fellow on weekends and after 5pm on weekdays: [phone]    Angélica Alejo MD  Gastroenterology Fellow  [phone]  Available on [employer] Teams  Please page on call fellow on weekends and after 5pm on weekdays: [phone]

## 2020-09-14 NOTE — DISCHARGE NOTE PROVIDER - CARE PROVIDER_API CALL
Brunner, Robert J  MEDICINE - [facility]  [address] [facility], [address] [address]  Phone: [phone]  Fax: [phone]  Follow Up Time:     [place] [address]  Phone: [phone]  Fax: [phone]  Follow Up Time:

## 2020-09-14 NOTE — DISCHARGE NOTE PROVIDER - NSDCMRMEDTOKEN_GEN_ALL_CORE_FT
amLODIPine 5 mg oral tablet: 1 tab(s) orally once a day    Creon 36,000 units oral delayed release capsule: 2 cap(s) orally 3 times a day  metoprolol succinate 25 mg oral tablet, extended release: 2 tab(s) orally once a day  pantoprazole 40 mg oral delayed release tablet: 1 tab(s) orally 2 times a day  Xanax 0.5 mg oral tablet: 1 tab(s) orally 2 times a day, As Needed   amLODIPine 5 mg oral tablet: 1 tab(s) orally once a day  Creon 36,000 units oral delayed release capsule: 2 cap(s) orally 3 times a day  metoprolol succinate 50 mg oral tablet, extended release: 1 tab(s) orally once a day (at bedtime)  oxycodone-acetaminophen 5 mg-325 mg oral tablet: 1 tab(s) orally every 6 hours, As needed, Moderate Pain (4 - 6) MDD:4  pantoprazole 40 mg oral delayed release tablet: 1 tab(s) orally once a day (before a meal)  simethicone 80 mg oral tablet, chewable: 1 tab(s) orally 2 times a day  Xanax 0.5 mg oral tablet: 1 tab(s) orally 2 times a day, As Needed  Zofran 4 mg oral tablet: 1 tab(s) orally every 8 hours

## 2020-09-14 NOTE — DISCHARGE NOTE PROVIDER - HOSPITAL COURSE
55 F hx of pancreatic divisum complicated by chronic pancreatitis s/p prior stent (since removed), recurrent episodes of abdominal pain Q3 months, SLE, ITP s/p splenectomy and Hypertension presents with abdominal pain.     Seen by GI:   # Abdominal Pain: Differential includes gastroparesis and/or possible pyloric stenosis given EUS findings of retained food/fluid in the stomach & stenotic pylorus. Differential also includes chronic pancreatitis. Clinically improved  # Chronic Pancreatitis: EUS findings concerning for early changes (mild hyperechoic stranding) with tortuous pancreatic duct with dilated up to 4.9mm  # Dilated CBD: Mildly dilated without stones or lesions on EUS. Suspect secondary to post-cholecystectomy state.  (New biliary tree dilated (Intra + Extrahepatic): Unclear etiology. Suspect secondary to biliary stricture given known chronic pancreatitis. Less likely stones given cholecystectomy. Differential also includes masses given dilated PD and CBD  # SLE  # ITP s/p splenectomy    - Outpatient follow up with Dr. Brunner  - Gastric emptying study if able to be off pain medications  - Pain control  - Pt ruthie meals, no c/o n/v - mild abd discomfort 55 F hx of pancreatic divisum complicated by chronic pancreatitis s/p prior stent (since removed), recurrent episodes of abdominal pain Q3 months, SLE, ITP s/p splenectomy and Hypertension presents with abdominal pain.     Seen by GI:   # Abdominal Pain: Differential includes gastroparesis and/or possible pyloric stenosis given EUS findings of retained food/fluid in the stomach & stenotic pylorus. Differential also includes chronic pancreatitis. Clinically improved  # Chronic Pancreatitis: EUS findings concerning for early changes (mild hyperechoic stranding) with tortuous pancreatic duct with dilated up to 4.9mm  # Dilated CBD: Mildly dilated without stones or lesions on EUS. Suspect secondary to post-cholecystectomy state.  (New biliary tree dilated (Intra + Extrahepatic): Unclear etiology. Suspect secondary to biliary stricture given known chronic pancreatitis. Less likely stones given cholecystectomy. Differential also includes masses given dilated PD and CBD  # SLE  # ITP s/p splenectomy    - Outpatient follow up with Dr. Brunner  - Gastric emptying study if able to be off pain medications  - Pain control  - Pt ruthie meals, no c/o n/v - mild abd discomfort  -Pt medically cleared for discharge to home

## 2020-09-14 NOTE — DISCHARGE NOTE NURSING/CASE MANAGEMENT/SOCIAL WORK - PATIENT PORTAL LINK FT
You can access the FollowMyHealth Patient Portal offered by [facility] by registering at the following website: [email or web address]. By joining [facility]’s FollowMyHealth portal, you will also be able to view your health information using other applications (apps) compatible with our system.

## 2020-09-14 NOTE — DISCHARGE NOTE PROVIDER - NSDCCPCAREPLAN_GEN_ALL_CORE_FT
PRINCIPAL DISCHARGE DIAGNOSIS  Diagnosis: Acute abdominal pain  Assessment and Plan of Treatment: Had EUS on 9/10: Tolerated procedure well. Follow up with Gastroenterology for possible Gastric emptying study when off your pain medication.      SECONDARY DISCHARGE DIAGNOSES  Diagnosis: Chronic pancreatitis  Assessment and Plan of Treatment: Concern for chronic pancreatitis. Improved. Notify your doctor if you develop nausea/ vomiting or persistent/ worsening abdominal pain.   Continue with pancreatits enzymes  Follow up with Gastroenterology.    Diagnosis: Dilated bile duct  Assessment and Plan of Treatment: Mild Dilated Common bile duct on imaging. Follow up with Gastroenterology.    Diagnosis: Gastroesophageal reflux disease without esophagitis  Assessment and Plan of Treatment: Continue with Protonix

## 2020-09-14 NOTE — PROGRESS NOTE ADULT - SUBJECTIVE AND OBJECTIVE BOX
Chief Complaint:  Patient is a 55y old  Female who presents with a chief complaint of abdominal pain (13 Sep 2020 15:46)    Interval Events:   No acute overnight events  No nausea, vomiting, diarrhea  Abdominal pain improved  Wants to go home    Allergies:  No Known Allergies    Hospital Medications:  amLODIPine   Tablet 5 milliGRAM(s) Oral daily  enoxaparin Injectable 40 milliGRAM(s) SubCutaneous daily  HYDROmorphone  Injectable 0.5 milliGRAM(s) IV Push every 4 hours PRN  influenza   Vaccine 0.5 milliLiter(s) IntraMuscular once  lactated ringers. 1000 milliLiter(s) IV Continuous <Continuous>  lactulose Syrup 10 Gram(s) Oral once  metoprolol succinate ER 50 milliGRAM(s) Oral at bedtime  ondansetron Injectable 4 milliGRAM(s) IV Push every 6 hours PRN  oxycodone    5 mG/acetaminophen 325 mG 1 Tablet(s) Oral every 6 hours PRN  pancrelipase  (CREON 36,000 Lipase Units) 2 Capsule(s) Oral three times a day with meals  pantoprazole    Tablet 40 milliGRAM(s) Oral before breakfast  simethicone 80 milliGRAM(s) Chew two times a day  sodium chloride 0.9%. 1000 milliLiter(s) IV Continuous <Continuous>      PMHX/PSHX:  Anxiety    Hernia    Gastritis    Lupus    Pancreatitis    Lupus    Anxiety    ITP (idiopathic thrombocytopenic purpura)    Lupus (systemic lupus erythematosus)    Pancreas divisum    Pancreatitis    Breast CA    S/P hernia repair    S/P inguinal hernia repair    H/O bilateral breast implants    S/P hysterectomy    S/P splenectomy    History of hysterectomy    History of splenectomy        ROS:   General:  No fevers, chills or night sweats  ENT:  No sore throat or dysphagia  CV:  No pain or palpitations  Resp:  No dyspnea, cough or  wheezing  GI:  No pain, No nausea, No vomiting, No diarrhea, No rectal bleeding, No tarry stools,  Skin:  No rash or edema    PHYSICAL EXAM:   Vital Signs:  Vital Signs Last 24 Hrs  T(C): 36.6 (14 Sep 2020 05:42), Max: 36.6 (13 Sep 2020 13:31)  T(F): 97.8 (14 Sep 2020 05:42), Max: 97.9 (13 Sep 2020 13:31)  HR: 62 (14 Sep 2020 05:42) (56 - 64)  BP: 116/75 (14 Sep 2020 05:42) (100/68 - 116/75)  BP(mean): --  RR: 16 (14 Sep 2020 05:42) (16 - 16)  SpO2: 96% (14 Sep 2020 05:42) (96% - 98%)  Daily     Daily Weight in k.7 (13 Sep 2020 16:16)    GENERAL:  NAD, Appears stated age  HEENT:  NC/AT,  conjunctivae clear and pink, sclera -anicteric  CHEST:  Normal Effort, Breath sounds clear  HEART:  RRR, S1 + S2, no murmurs  ABDOMEN:  Soft, non-tender, non-distended, BS+  EXTEREMITIES:  no cyanosis  SKIN:  Warm & Dry.  NEURO:  Alert, oriented    LABS:          138  |  97  |  5<L>  ----------------------------<  88  4.1   |  31  |  0.69    Ca    9.9      13 Sep 2020 07:00    TPro  6.6  /  Alb  3.9  /  TBili  0.5  /  DBili  x   /  AST  25  /  ALT  25  /  AlkPhos  51  13    LIVER FUNCTIONS - ( 13 Sep 2020 07:00 )  Alb: 3.9 g/dL / Pro: 6.6 g/dL / ALK PHOS: 51 U/L / ALT: 25 U/L / AST: 25 U/L / GGT: x                     Imaging:

## 2020-09-14 NOTE — PROGRESS NOTE ADULT - ATTENDING COMMENTS
As above.    Not clear if she has diagnosis of symptomatic chronic pancreatitis.  Does have some component of gastroparesis.  Differential diagnosis includes functional pain component/dysmotility/small intestinal bacterial overgrowth.    Recommendations:    Nutrition/dietitian consult for empiric gastroparesis diet.  Wean narcotics as tolerated.  Followup with Dr. Robert Brunner (outpatient GI)  Eventual outpatient gastric emptying study if able to be off medications which interfere with test interpretation (narcotics)
DC home today

## 2020-09-14 NOTE — DISCHARGE NOTE PROVIDER - CARE PROVIDERS DIRECT ADDRESSES
[email or web address],DirectAddress_Unknown

## 2020-09-14 NOTE — PROGRESS NOTE ADULT - SUBJECTIVE AND OBJECTIVE BOX
Patient is a 55y old  Female who presents with a chief complaint of abdominal pain (12 Sep 2020 16:35)      SUBJECTIVE / OVERNIGHT EVENTS:    Events noted.  CONSTITUTIONAL: No fever,  or fatigue  RESPIRATORY: No cough, wheezing,  No shortness of breath  CARDIOVASCULAR: No chest pain, palpitations, dizziness, or leg swelling  GASTROINTESTINAL: No abdominal or epigastric pain. No nausea, vomiting.  NEUROLOGICAL: No headaches,     MEDICATIONS  (STANDING):  amLODIPine   Tablet 5 milliGRAM(s) Oral daily  enoxaparin Injectable 40 milliGRAM(s) SubCutaneous daily  influenza   Vaccine 0.5 milliLiter(s) IntraMuscular once  lactated ringers. 1000 milliLiter(s) (100 mL/Hr) IV Continuous <Continuous>  lactulose Syrup 10 Gram(s) Oral once  metoprolol succinate ER 50 milliGRAM(s) Oral at bedtime  pancrelipase  (CREON 36,000 Lipase Units) 2 Capsule(s) Oral three times a day with meals  pantoprazole    Tablet 40 milliGRAM(s) Oral before breakfast  simethicone 80 milliGRAM(s) Chew two times a day  sodium chloride 0.9%. 1000 milliLiter(s) (30 mL/Hr) IV Continuous <Continuous>    MEDICATIONS  (PRN):  HYDROmorphone  Injectable 0.5 milliGRAM(s) IV Push every 4 hours PRN Severe Pain (7 - 10)  ondansetron Injectable 4 milliGRAM(s) IV Push every 6 hours PRN Nausea and/or Vomiting  oxycodone    5 mG/acetaminophen 325 mG 1 Tablet(s) Oral every 6 hours PRN Moderate Pain (4 - 6)        CAPILLARY BLOOD GLUCOSE        I&O's Summary    12 Sep 2020 07:01  -  13 Sep 2020 07:00  --------------------------------------------------------  IN: 2200 mL / OUT: 0 mL / NET: 2200 mL    13 Sep 2020 07:01  -  13 Sep 2020 21:51  --------------------------------------------------------  IN: 480 mL / OUT: 0 mL / NET: 480 mL        PHYSICAL EXAM:  GENERAL: NAD  NECK: Supple, No JVD  CHEST/LUNG: Clear to auscultation bilaterally; No wheezing.  HEART: Regular rate and rhythm; No murmurs, rubs, or gallops  ABDOMEN: Soft, Nontender, Nondistended; Bowel sounds present  EXTREMITIES:   No edema  NEUROLOGY: AAO X 3      LABS:    09-13    138  |  97  |  5<L>  ----------------------------<  88  4.1   |  31  |  0.69    Ca    9.9      13 Sep 2020 07:00    TPro  6.6  /  Alb  3.9  /  TBili  0.5  /  DBili  x   /  AST  25  /  ALT  25  /  AlkPhos  51  09-13            CAPILLARY BLOOD GLUCOSE                    RADIOLOGY & ADDITIONAL TESTS:    Imaging Personally Reviewed:    Consultant(s) Notes Reviewed:      Care Discussed with Consultants/Other Providers:    James Villarreal MD, CMD, FACP    [address] [place] [address]  Office Tel: [phone]  Cell: [phone]

## 2020-10-19 ENCOUNTER — OUTPATIENT (OUTPATIENT)
Dept: OUTPATIENT SERVICES | Facility: HOSPITAL | Age: 55
LOS: 1 days | End: 2020-10-19

## 2020-10-19 ENCOUNTER — APPOINTMENT (OUTPATIENT)
Dept: NUCLEAR MEDICINE | Facility: HOSPITAL | Age: 55
End: 2020-10-19
Payer: COMMERCIAL

## 2020-10-19 ENCOUNTER — RESULT REVIEW (OUTPATIENT)
Age: 55
End: 2020-10-19

## 2020-10-19 DIAGNOSIS — Z90.710 ACQUIRED ABSENCE OF BOTH CERVIX AND UTERUS: Chronic | ICD-10-CM

## 2020-10-19 DIAGNOSIS — K30 FUNCTIONAL DYSPEPSIA: ICD-10-CM

## 2020-10-19 DIAGNOSIS — Z90.81 ACQUIRED ABSENCE OF SPLEEN: Chronic | ICD-10-CM

## 2020-10-19 PROCEDURE — 78264 GASTRIC EMPTYING IMG STUDY: CPT | Mod: 26

## 2020-10-26 NOTE — DIETITIAN INITIAL EVALUATION ADULT. - PATIENT PROFILE REVIEWED
Added to schedule left message for call back please transfer to [place] for check in, Wendi Delacruz MA     yes

## 2020-10-27 ENCOUNTER — APPOINTMENT (OUTPATIENT)
Dept: SURGERY | Facility: CLINIC | Age: 55
End: 2020-10-27
Payer: COMMERCIAL

## 2020-11-10 ENCOUNTER — APPOINTMENT (OUTPATIENT)
Dept: SURGERY | Facility: CLINIC | Age: 55
End: 2020-11-10
Payer: COMMERCIAL

## 2020-11-10 VITALS
SYSTOLIC BLOOD PRESSURE: 118 MMHG | OXYGEN SATURATION: 99 % | HEIGHT: 66 IN | RESPIRATION RATE: 16 BRPM | TEMPERATURE: 98.1 F | BODY MASS INDEX: 20.89 KG/M2 | DIASTOLIC BLOOD PRESSURE: 82 MMHG | HEART RATE: 65 BPM | WEIGHT: 130 LBS

## 2020-11-10 DIAGNOSIS — K30 FUNCTIONAL DYSPEPSIA: ICD-10-CM

## 2020-11-10 PROCEDURE — 99203 OFFICE O/P NEW LOW 30 MIN: CPT

## 2020-11-18 RX ORDER — ERYTHROMYCIN 250 MG/1
250 TABLET, FILM COATED ORAL 3 TIMES DAILY
Qty: 90 | Refills: 2 | Status: DISCONTINUED | COMMUNITY
Start: 2020-11-10 | End: 2020-11-18

## 2020-11-18 RX ORDER — ERYTHROMYCIN 250 MG/1
250 TABLET, FILM COATED ORAL 3 TIMES DAILY
Qty: 90 | Refills: 2 | Status: ACTIVE | COMMUNITY
Start: 2020-11-18 | End: 1900-01-01

## 2020-11-19 PROBLEM — K30: Status: ACTIVE | Noted: 2020-09-16

## 2020-11-19 NOTE — REVIEW OF SYSTEMS
[Patient Intake Form Reviewed] : Patient intake form was reviewed [Negative] : Heme/Lymph [FreeTextEntry8] : see HPI

## 2020-11-19 NOTE — HISTORY OF PRESENT ILLNESS
[de-identified] : Ms. RIKY DOUGHERTY is a 55 year old female who presents for evaluation of suspected chronic pancreatitis. She has history of pancreas divisum, lupus, ITP s/p splenectomy. She underwent an ERCP with pancreatic duct stent placement in 2018 and developed acute pancreatitis after. \par She was recently admitted to [facility] 9/9/20- 9/14/20 with c/o abdominal pain. CT a/p on  9/9/20 noted no CT evidence of pancreatitis, pancreas divisum, mild dilatation of the main pancreatic duct, intra and extrahepatic biliary ductal dilatation.  MRI/MRCP 9/10/20 noted biliary ductal dilatation to the level of ampulla of uncertain etiology, increased from 2018. No choledocholithiasis. No MR evidence of pancreatitis.\par \par EUS 9/10/20 noted retained fluid in gastric fundus despite NPO status with stenotic pylorus. Salt-and-pepper pancreatic parencyhma with mild hyperechoic stranding, without other characteristics of chronic pancreatitis. Tortuous pancreatic duct with areas of dilation up to 4.9mm (largest dilation in body of pancreas). No PD stones or lesions. Distinct insertions of PD and CBD, consistent with known diagnosis of pancreas divisum. Mildly dilated CBD without stones or lesions, possibly due to post-cholecystectomy state. Remnant spleen or splenule. \par \par GE study 10/19/20-- normal. \par \par 11/10/20- Patient reports having ongoing symptoms of nausea, intermittent vomiting, diarrhea, and gas//bloating after meals. Symptoms started in 2017. She had a cholecystectomy in 2018 with no improvement. Of note, has remnant gall bladder on imaging.  She reports having an acute pancreatitis episode in 2015.  Her second acute pancreatitis episode was in 2018 after ERCP with pancreatic duct stent placement. She did not have improvement of symptoms after stent, but patient reports was unknown how long stent remained in before it was no longer present on imaging.  She is taking Creon with only mild improvement on it. She is also taking pantoprazole daily.

## 2020-11-19 NOTE — PHYSICAL EXAM
[Normal] : oriented to person, place and time, with appropriate affect [de-identified] : anicteric [de-identified] : supple [de-identified] : normal respiratory effort  [de-identified] : normal appearance

## 2020-11-19 NOTE — ASSESSMENT
[FreeTextEntry1] : History of acute pancreatitis years ago. Has chronic symptoms of n/v/d/abdominal gas / bloating with meals. EGD with retained food in stomach.  Suspect IBS. \par \par -Consult motility GI \par -Trial Erythromycin 250 mg TID for delayed gastric emptying\par -RTC after above

## 2020-12-09 NOTE — ED PROVIDER NOTE - GASTROINTESTINAL [+], MLM
ABDOMINAL PAIN/VOMITING/DIARRHEA/NAUSEA <-------- Click here to INCLUDE CoVID-19 Discharge Instructions

## 2021-01-18 NOTE — ED ADULT NURSE NOTE - CHIEF COMPLAINT QUOTE
pancreatitis attack x since 3 days, not etoh "stress related", hx lupus, +nausea no vomiting Bexarotene Pregnancy And Lactation Text: This medication is Pregnancy Category X and should not be given to women who are pregnant or may become pregnant. This medication should not be used if you are breast feeding.
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
## 2022-03-23 NOTE — ED ADULT NURSE NOTE - ED STAT RN HANDOFF DETAILS
Saw patient for airway evaluation prior to her scheduled colonoscopy 3/29/22.    Patient with morbid obesity, SMITA with CPAP use, moderate pulmonary HTN, nonsmoker. Has BRAMBILA with stairs. No CP, palpitations, orthopnea or PND. Has hx CHF per chart but has no symptoms of this and BNP's have been normal.    10/2020 stress perfusion: no ischemia; no wall motion abnormalities  3/2022 echo: EF 55%; no regional WMA's; mild decreased RV sys fnx; mod pulmonary HTN RVSP 40 (stable), PAEDP 14; valves ok    Exam: upper dentures; MP II; good TMD and neck mobility.  Lungs clear    Impression: No contraindication to anesthesia at [address] [place] [address]. I discussed the anesthetic with the patient.    Honduran video interpretor used for interview/discussion.   Report given to Gato KO

## 2022-04-29 NOTE — PATIENT PROFILE ADULT - NSPROPTRIGHTSUPPORTPHONE_GEN_A_NUR
Medication:   Requested Prescriptions     Pending Prescriptions Disp Refills    dilTIAZem (CARDIZEM CD) 360 MG extended release capsule [Pharmacy Med Name: DILTIAZEM ER 360MG (24 HR/CD) CAPS] 90 capsule 1     Sig: TAKE 1 CAPSULE BY MOUTH DAILY        Last Filled:  10/29/2021    Patient Phone Number: [phone] (home)     Last appt: 4/12/2022   Next appt: Visit date not found    Last OARRS: No flowsheet data found. [phone]

## 2023-03-03 NOTE — PROGRESS NOTE ADULT - ASSESSMENT
Health Maintenance Due   Topic Date Due   • DTaP/Tdap/Td Vaccine (2 - Td or Tdap) 12/03/2022   • Traditional Medicare- Medicare Wellness Visit  Never done       Patient is due for topics listed above, he wishes to proceed with MWV (Medicare Wellness Visit), but is not proceeding with Immunization(s) Dtap/Tdap/Td at this time. The following has occurred: Education provided for Immunization(s) Dtap/Tdap/Td.   A/P: S/p Lap zuleima for sludge. Chronic pancreatitis. Pancreas divisum. Persistent abdominal pain and nausea. Will put back on clears for now. Advance tomorrow if doing better. If no significant improvement over next 48 hours or so, then will plan for EUS/ERCP next week. She is agreeable to this plan.

## 2023-04-09 NOTE — ED PROVIDER NOTE - PROGRESS NOTE DETAILS
CT results reviewed and d/w patient. Pt unable to tolerate PO. Spoke with Dr. Villarreal for admission. - Yecenia Syed PA-C No

## 2023-04-20 NOTE — PATIENT PROFILE ADULT. - NSALCOHOLPROBLEMSRELYN_GEN_A_CORE_SD
Quality 110: Preventive Care And Screening: Influenza Immunization: Influenza Immunization not Administered because Patient Refused. Quality 111:Pneumonia Vaccination Status For Older Adults: Pneumococcal vaccine (PPSV23) administered on or after patient’s 60th birthday and before the end of the measurement period Quality 130: Documentation Of Current Medications In The Medical Record: Current Medications Documented Detail Level: Detailed Quality 402: Tobacco Use And Help With Quitting Among Adolescents: Patient screened for tobacco and never smoked Quality 431: Preventive Care And Screening: Unhealthy Alcohol Use - Screening: Patient not identified as an unhealthy alcohol user when screened for unhealthy alcohol use using a systematic screening method no

## 2024-02-01 NOTE — ED ADULT TRIAGE NOTE - RESPIRATORY RATE (BREATHS/MIN)
18 Detail Level: Zone If an abscess arises, patient advised to start topical Clindamycin. If not improved, patient recommended to use antibiotics or I&D.\\n\\nPatient advised to contact office first when in need of refills.

## 2024-03-20 NOTE — PROGRESS NOTE ADULT - ASSESSMENT
Dry Skin Care    1.  Use soap less often, and try a mild fragrance-free soap.  2.  Use moisturizer during the day as often as possible.   3.  Within 3 minutes after bathing, apply the moisturizer to your entire body to trap the moisture in your skin.  4.  Avoid long, hot showers because hot water removes oils from your skin more quickly.   5.  Ointments (clear, thick, greasy) and creams (white, thick, in a jar) work better as moisturizers than lotions (white, thin, easier to spread).    6.  Ointment examples: Aquaphor ointment, CeraVe Healing Ointment, Vaseline (petrolatum, petroleum jelly).  7.  Cream examples (creams are typically in jars): Vanicream, Cetaphil, CeraVe, Eucerin, Lubriderm, Aveeno, Oil of Olay, Moisturel, SBR Lipocream, Curel, DML Forte  8.  Mild soap examples: Dove, Oil of Olay, Cetaphil, Purpose  9.  Good creams for dry hands: Neutrogena (Norwegian) Handcream, Eucerin         · Assessment	  51F c hx SLE, chronic pancreatitis, ITP s/p splenectomy, current smoker, pw pancreatitis     Problem/Plan - 1:  · Abd pain sec to post procedure pancreatitis:    clear liquids  GI f/up noted.  Pain control    Rt gluteal black spot:  Sx f/up.

## 2024-09-27 NOTE — ED ADULT TRIAGE NOTE - BSA (M2)
Raza Esparza,   9/27/2024  3:04 PM CDT       Please inform patient        Normal A1C and CBC  CPM (continue present management, no changes)  Call for questions or concerns     Follow up as scheduled,  Thanks MN     
[employer] message sent.   
1.69